# Patient Record
Sex: FEMALE | Race: WHITE | NOT HISPANIC OR LATINO | Employment: OTHER | URBAN - METROPOLITAN AREA
[De-identification: names, ages, dates, MRNs, and addresses within clinical notes are randomized per-mention and may not be internally consistent; named-entity substitution may affect disease eponyms.]

---

## 2017-10-03 ENCOUNTER — MYC MEDICAL ADVICE (OUTPATIENT)
Dept: FAMILY MEDICINE | Facility: CLINIC | Age: 67
End: 2017-10-03

## 2017-10-03 ENCOUNTER — OFFICE VISIT (OUTPATIENT)
Dept: FAMILY MEDICINE | Facility: CLINIC | Age: 67
End: 2017-10-03
Payer: COMMERCIAL

## 2017-10-03 VITALS
HEART RATE: 78 BPM | SYSTOLIC BLOOD PRESSURE: 156 MMHG | TEMPERATURE: 97.9 F | WEIGHT: 148 LBS | OXYGEN SATURATION: 95 % | BODY MASS INDEX: 27.07 KG/M2 | RESPIRATION RATE: 14 BRPM | DIASTOLIC BLOOD PRESSURE: 103 MMHG

## 2017-10-03 DIAGNOSIS — R30.0 DYSURIA: Primary | ICD-10-CM

## 2017-10-03 DIAGNOSIS — R03.0 ELEVATED BLOOD PRESSURE READING WITHOUT DIAGNOSIS OF HYPERTENSION: ICD-10-CM

## 2017-10-03 DIAGNOSIS — E55.9 VITAMIN D DEFICIENCY: ICD-10-CM

## 2017-10-03 DIAGNOSIS — R73.01 IMPAIRED FASTING GLUCOSE: ICD-10-CM

## 2017-10-03 DIAGNOSIS — Z13.1 SCREENING FOR DIABETES MELLITUS: ICD-10-CM

## 2017-10-03 DIAGNOSIS — R53.83 FATIGUE, UNSPECIFIED TYPE: ICD-10-CM

## 2017-10-03 DIAGNOSIS — E78.5 HYPERLIPIDEMIA LDL GOAL <130: ICD-10-CM

## 2017-10-03 DIAGNOSIS — Z11.59 NEED FOR HEPATITIS C SCREENING TEST: ICD-10-CM

## 2017-10-03 LAB
ALBUMIN UR-MCNC: NEGATIVE MG/DL
APPEARANCE UR: CLEAR
BACTERIA #/AREA URNS HPF: ABNORMAL /HPF
BILIRUB UR QL STRIP: NEGATIVE
COLOR UR AUTO: YELLOW
GLUCOSE UR STRIP-MCNC: NEGATIVE MG/DL
HBA1C MFR BLD: 5.5 % (ref 4.3–6)
HGB UR QL STRIP: ABNORMAL
HYALINE CASTS #/AREA URNS LPF: ABNORMAL /LPF
KETONES UR STRIP-MCNC: NEGATIVE MG/DL
LEUKOCYTE ESTERASE UR QL STRIP: NEGATIVE
MUCOUS THREADS #/AREA URNS LPF: PRESENT /LPF
NITRATE UR QL: NEGATIVE
NON-SQ EPI CELLS #/AREA URNS LPF: ABNORMAL /LPF
PH UR STRIP: 5.5 PH (ref 5–7)
RBC #/AREA URNS AUTO: ABNORMAL /HPF
SOURCE: ABNORMAL
SP GR UR STRIP: 1.01 (ref 1–1.03)
UROBILINOGEN UR STRIP-ACNC: 0.2 EU/DL (ref 0.2–1)
WBC #/AREA URNS AUTO: ABNORMAL /HPF

## 2017-10-03 PROCEDURE — 36415 COLL VENOUS BLD VENIPUNCTURE: CPT | Performed by: FAMILY MEDICINE

## 2017-10-03 PROCEDURE — 86803 HEPATITIS C AB TEST: CPT | Performed by: FAMILY MEDICINE

## 2017-10-03 PROCEDURE — 80053 COMPREHEN METABOLIC PANEL: CPT | Performed by: FAMILY MEDICINE

## 2017-10-03 PROCEDURE — 81001 URINALYSIS AUTO W/SCOPE: CPT | Performed by: FAMILY MEDICINE

## 2017-10-03 PROCEDURE — 99214 OFFICE O/P EST MOD 30 MIN: CPT | Performed by: FAMILY MEDICINE

## 2017-10-03 PROCEDURE — 80061 LIPID PANEL: CPT | Performed by: FAMILY MEDICINE

## 2017-10-03 PROCEDURE — 83036 HEMOGLOBIN GLYCOSYLATED A1C: CPT | Performed by: FAMILY MEDICINE

## 2017-10-03 PROCEDURE — 84443 ASSAY THYROID STIM HORMONE: CPT | Performed by: FAMILY MEDICINE

## 2017-10-03 PROCEDURE — 82306 VITAMIN D 25 HYDROXY: CPT | Performed by: FAMILY MEDICINE

## 2017-10-03 RX ORDER — DIPHENHYD/PHENYLEPH/ACETAMINOP 12.5-5-325
1 TABLET ORAL 2 TIMES DAILY
Qty: 1 KIT | Refills: 0 | Status: SHIPPED | OUTPATIENT
Start: 2017-10-03 | End: 2018-12-20

## 2017-10-03 NOTE — MR AVS SNAPSHOT
After Visit Summary   10/3/2017    Teetee Dalal    MRN: [id number]           Patient Information     Date Of Birth          1950        Visit Information        Provider Department      10/3/2017 11:40 AM Sandy Hargrove MD [facility]        Today's Diagnoses     Dysuria    -  1    Fatigue, unspecified type        Vitamin D deficiency        Hyperlipidemia LDL goal <130        Need for hepatitis C screening test        Screening for diabetes mellitus        Impaired fasting glucose         Elevated blood pressure reading without diagnosis of hypertension          Care Instructions     [facility]  [address]  [address], [place] [address]  [phone]     Dr. Ray, OB-Gyn, is at our clinic, [facility] on Wednesdays.            Follow-ups after your visit        Who to contact     If you have questions or need follow up information about today's clinic visit or your schedule please contact [facility] directly at [phone].  Normal or non-critical lab and imaging results will be communicated to you by OKpandahart, letter or phone within 4 business days after the clinic has received the results. If you do not hear from us within 7 days, please contact the clinic through [employer] or phone. If you have a critical or abnormal lab result, we will notify you by phone as soon as possible.  Submit refill requests through [employer] or call your pharmacy and they will forward the refill request to us. Please allow 3 business days for your refill to be completed.          Additional Information About Your Visit        MyChart Information     [employer] gives you secure access to your electronic health record. If you see a primary care provider, you can also send messages to your care team and make appointments. If you have questions, please call your primary care clinic.  If you do not have a primary care provider, please call  [phone] and they will assist you.        Care EveryWhere ID     This is your Care EveryWhere ID. This could be used by other organizations to access your [place] medical records  [id number]        Your Vitals Were     Pulse Temperature Respirations Pulse Oximetry BMI (Body Mass Index)       78 97.9  F (36.6  C) (Oral) 14 95% 27.07 kg/m2        Blood Pressure from Last 3 Encounters:   10/03/17 (!) 156/103   11/16/16 (!) 143/93   09/22/16 142/84    Weight from Last 3 Encounters:   10/03/17 148 lb (67.1 kg)   11/16/16 142 lb (64.4 kg)   09/22/16 142 lb (64.4 kg)              We Performed the Following     Comprehensive metabolic panel     Hemoglobin A1c     Hepatitis C Screen Reflex to HCV RNA Quant and Genotype     Lipid panel reflex to direct LDL     TSH with free T4 reflex     UA reflex to Microscopic and Culture     Vitamin D Deficiency          Today's Medication Changes          These changes are accurate as of: 10/3/17 12:32 PM.  If you have any questions, ask your nurse or doctor.               Start taking these medicines.        Dose/Directions    blood pressure kit Kit   Used for:  Elevated blood pressure reading without diagnosis of hypertension   Started by:  Sandy Hargrove MD        Dose:  1 kit   1 kit 2 times daily   Quantity:  1 kit   Refills:  0            Where to get your medicines      Some of these will need a paper prescription and others can be bought over the counter.  Ask your nurse if you have questions.     Bring a paper prescription for each of these medications     blood pressure kit Kit                Primary Care Provider Office Phone # Fax #    Sandy Hargrove [phone] [address] [place] [id number]        Equal Access to Services     [facility]: Hadii nadege Pruitt, parul walls, qaanton corea. So [facility] [phone].    ATENCIÓN: Si parveen esposito reeves  disposición servicios gratuitos de asistencia lingüística. Arablela michael [phone].    We comply with applicable federal civil rights laws and [place] laws. We do not discriminate on the basis of race, color, national origin, age, disability, sex, sexual orientation, or gender identity.            Thank you!     Thank you for choosing [facility]  for your care. Our goal is always to provide you with excellent care. Hearing back from our patients is one way we can continue to improve our services. Please take a few minutes to complete the written survey that you may receive in the mail after your visit with us. Thank you!             Your Updated Medication List - Protect others around you: Learn how to safely use, store and throw away your medicines at www.disposemymeds.org.          This list is accurate as of: 10/3/17 12:32 PM.  Always use your most recent med list.                   Brand Name Dispense Instructions for use Diagnosis    blood pressure kit Kit     1 kit    1 kit 2 times daily    Elevated blood pressure reading without diagnosis of hypertension

## 2017-10-03 NOTE — PROGRESS NOTES
Hello! Thank you for getting labs done. Your lab test to check for diabetes, HgA1C, also called glycosylated hemoglobin, which measures the level of sugar in your blood over the past few months, is normal which is great!     Congratulations, you don't have diabetes!  However, since your fasting blood sugars have been high in the past, I will continue to screen your blood sugar every year.     If you have any questions, please contact the clinic or schedule an appointment with me, thank you!    Sincerely,    SILVIA AVALOS MD   10/3/2017

## 2017-10-03 NOTE — PROGRESS NOTES
"  SUBJECTIVE:   Teetee Dalal is a 66 year old female who presents to clinic today for the following health issues:      URINARY TRACT SYMPTOMS, UTI with fatigue      Duration: x 1 month; yesterday woke up feeling very unwell without body aches, slept all day yesterday    Description  dysuria and frequency    Intensity:  moderate    Accompanying signs and symptoms:  Fever/chills: no, although briefly yesterday had a hot flash like episode \"clammy\" and felt very unwell  Flank pain no   Nausea and vomiting: no   Vaginal symptoms: none  Abdominal/Pelvic Pain: no     History  History of frequent UTI's: YES- once a year  History of kidney stones: no   Sexually Active: no   Possibility of pregnancy: No    Precipitating or alleviating factors: None, no sick contacts        ROS negative for rashes, cough, runny nose, sob, chest pain     Allergies   Allergen Reactions     No Known Drug Allergies       Past Medical History:   Diagnosis Date     Breast cancer (H) 9/10/96     Hearing loss      Hypertension     On occasion     Intraductal carcinoma, noninfiltrating 4/3/2013    Noted by cancer registry, I'm not sure who is following her, see scan 4/3/2013      Nasal congestion      Screening colonoscopy 6/17/2011      BP (!) 156/103  Pulse 78  Temp 97.9  F (36.6  C) (Oral)  Resp 14  Wt 148 lb (67.1 kg)  SpO2 95%  BMI 27.07 kg/m2 s    BP Readings from Last 3 Encounters:   10/03/17 (!) 156/103   11/16/16 (!) 143/93   09/22/16 142/84      Health Maintenance Due   Topic Date Due     A1C Q1 YR  10/13/1951     HEPATITIS C SCREENING  10/13/1966     DEXA SCAN SCREENING (SYSTEM ASSIGNED)  10/13/2013     BMP Q1 YR  06/05/2014     LIPID MONITORING Q1 YEAR  06/05/2014     MICROALBUMIN Q1 YEAR  06/05/2014     FALL RISK ASSESSMENT  07/05/2017     INFLUENZA VACCINE (SYSTEM ASSIGNED)  09/01/2017     PNEUMOCOCCAL (2 of 2 - PCV13) 09/22/2017      OBJECTIVE:  BP (!) 156/103  Pulse 78  Temp 97.9  F (36.6  C) (Oral)  Resp 14  Wt 148 lb " (67.1 kg)  SpO2 95%  BMI 27.07 kg/m2    Appears tired, in no apparent distress.  Vital signs are normal. The abdomen is soft without tenderness, guarding, mass, rebound or organomegaly. No CVA tenderness or inguinal adenopathy noted.   ASSESSMENT: UTI uncomplicated without evidence of pyelonephritis    PLAN: Treatment per orders - also push fluids, may use Pyridium OTC prn. Call or return to clinic prn if these symptoms worsen or fail to improve as anticipated.

## 2017-10-03 NOTE — PROGRESS NOTES
Patient was seen today in clinic.  I discussed results in clinic, please see clinic progress note.    Sandy Hargrove 10/3/2017

## 2017-10-03 NOTE — PATIENT INSTRUCTIONS
[facility]  [address]  [address], [place] [address]  [phone]     Dr. Ray, OB-Gyn, is at our clinic, [facility] on Wednesdays.

## 2017-10-03 NOTE — NURSING NOTE
"Chief Complaint   Patient presents with     UTI       Initial BP (!) 156/103  Pulse 78  Temp 97.9  F (36.6  C) (Oral)  Resp 14  Wt 148 lb (67.1 kg)  SpO2 95%  BMI 27.07 kg/m2 Estimated body mass index is 27.07 kg/(m^2) as calculated from the following:    Height as of 3/7/16: 5' 2\" (1.575 m).    Weight as of this encounter: 148 lb (67.1 kg).  Medication Reconciliation: complete     Jannet Luis MA    "

## 2017-10-04 LAB
ALBUMIN SERPL-MCNC: 3.7 G/DL (ref 3.4–5)
ALP SERPL-CCNC: 96 U/L (ref 40–150)
ALT SERPL W P-5'-P-CCNC: 31 U/L (ref 0–50)
ANION GAP SERPL CALCULATED.3IONS-SCNC: 7 MMOL/L (ref 3–14)
AST SERPL W P-5'-P-CCNC: 24 U/L (ref 0–45)
BILIRUB SERPL-MCNC: 0.9 MG/DL (ref 0.2–1.3)
BUN SERPL-MCNC: 11 MG/DL (ref 7–30)
CALCIUM SERPL-MCNC: 9.1 MG/DL (ref 8.5–10.1)
CHLORIDE SERPL-SCNC: 107 MMOL/L (ref 94–109)
CHOLEST SERPL-MCNC: 237 MG/DL
CO2 SERPL-SCNC: 26 MMOL/L (ref 20–32)
CREAT SERPL-MCNC: 0.64 MG/DL (ref 0.52–1.04)
DEPRECATED CALCIDIOL+CALCIFEROL SERPL-MC: 17 UG/L (ref 20–75)
GFR SERPL CREATININE-BSD FRML MDRD: >90 ML/MIN/1.7M2
GLUCOSE SERPL-MCNC: 84 MG/DL (ref 70–99)
HCV AB SERPL QL IA: NONREACTIVE
HDLC SERPL-MCNC: 38 MG/DL
LDLC SERPL CALC-MCNC: 130 MG/DL
NONHDLC SERPL-MCNC: 199 MG/DL
POTASSIUM SERPL-SCNC: 4.2 MMOL/L (ref 3.4–5.3)
PROT SERPL-MCNC: 7.2 G/DL (ref 6.8–8.8)
SODIUM SERPL-SCNC: 140 MMOL/L (ref 133–144)
TRIGL SERPL-MCNC: 347 MG/DL
TSH SERPL DL<=0.005 MIU/L-ACNC: 1.1 MU/L (ref 0.4–4)

## 2017-10-04 RX ORDER — ERGOCALCIFEROL 1.25 MG/1
50000 CAPSULE, LIQUID FILLED ORAL WEEKLY
Qty: 12 CAPSULE | Refills: 0 | Status: SHIPPED | OUTPATIENT
Start: 2017-10-04 | End: 2018-12-20

## 2017-10-05 NOTE — PROGRESS NOTES
Hello!  It was a pleasure to see you in clinic!  Thank you for getting labs done.     The testing of your blood sugar, kidney function, liver function and electrolytes was normal. Your screening test was negative for Hepatitis C, which is great news! You have NOT been infected with this liver disease.  You do not need any further testing for Hepatitis C.     Your thyroid function is normal, which is good news.  This means that you do not have hyperthyroidism or hypothyroidism.     Your vitamin D level is low.    As you may know, vitamin D deficiency is associated with many medical problems including osteoporosis, muscles aches and pains, weakness and falls.  Maintaining adequate levels is very important for good health.  During winter months (October through March), people in northern climates are not able to synthesize vitamin D from sunlight and therefore have higher rates of deficiency due to inadquate oral intake. Vitamin D deficiency is very common in [place]!    I recommend taking a high dose supplement for 3 months, 50,000 units once per week.  You could consider rechecking your level when you have finished this course. I will send a prescription to your pharmacy.     After you are done with the high dose,  I recommend taking a daily supplement of 2000 units daily.      Your cholesterol is abnormal. Your total cholesterol is high, your triglycerides are very high, and your HDL is too low. This does make your heart disease risk in the next 10 years high enough (13%) to warrant starting a cholesterol lowering medication.  Please schedule an appointment with me to discuss this when you get a chance!    Desired or goal levels are:  CHOLESTEROL: Desirable is less than 200.  HDL (Good Cholesterol): Desirable is greater than 40 in men and greater than 50 in women.  LDL (Bad Cholesterol): Desirable is less than 130  TRIGLYCERIDES: Desirable is less than 150.    Please follow the recommendations below and schedule a  lab only appointment ([phone]) in 2-3 months for a repeat fasting test. Please don't have anything to eat or drink (except water) for 8 hours prior to the test.    I recommend that you take Omega-3 fatty acids (available in capsules) to improve your triglycerides. You need 2000 - 4000 mg daily of EPA + DHA. This usually means 4 - 8 capsules a day, depending on the strength you buy.  To keep triglycerides in check, reduce alcohol, sugar, juice, excess fruit, bread, pasta, rice and  Cereal in your diet. Increase exercise  and Omega 3 (fish oil supplements) with  Meals.    Also consider starting or increasing your aerobic activity; this is the best way to improve HDL (good) cholesterol. Exercise for at least 30 min 5 times per week, and lift weights 2-3 times per week.    As you may know, abnormal cholesterol is one factor that increases your risk for heart disease and stroke. You can improve your cholesterol by controlling the amount and type of fat you eat and by increasing your daily activity level.    Here are some ways to improve your nutrition (adapted from the American Academy of Family Practice handout):  Eat less fat (especially butter, Crisco and other saturated fats)  Buy lean cuts of meat; reduce your portions of red meat or substitute poultry or fish, or avoid meat altogether.  Use skim milk and low-fat dairy products  Eat no more than 4 egg yolks per week  Avoid fried or fast foods that are high in fat  Eat more fruits and vegetables, trying to make your plate of food at least half non-starchy vegetables.    If you have any questions, please contact the clinic or schedule an appointment with me, thank you!    Sincerely,    Sandy Hargrove MD

## 2017-10-25 ENCOUNTER — OFFICE VISIT (OUTPATIENT)
Dept: OBGYN | Facility: CLINIC | Age: 67
End: 2017-10-25
Payer: COMMERCIAL

## 2017-10-25 VITALS
WEIGHT: 145 LBS | HEART RATE: 82 BPM | HEIGHT: 63 IN | BODY MASS INDEX: 25.69 KG/M2 | DIASTOLIC BLOOD PRESSURE: 76 MMHG | SYSTOLIC BLOOD PRESSURE: 120 MMHG

## 2017-10-25 DIAGNOSIS — N81.10 VAGINAL PROLAPSE: Primary | ICD-10-CM

## 2017-10-25 PROCEDURE — 99202 OFFICE O/P NEW SF 15 MIN: CPT | Performed by: OBSTETRICS & GYNECOLOGY

## 2017-10-25 NOTE — PROGRESS NOTES
"Teetee Dalal is a 67 year old  who presents to assess pelvic prolapse.  She is s/p abdominal hysterectomy in .  I last saw her in .  She was using a cube pessary but it did not work very well for her.  She has been using some menstrual sponges as pessaries, and this seems to help.  However, she notes increased problems lately--if constipated, the prolapse seems to make bowel movements more difficult.  She also has had urinary symptoms and been evaluated for UTI, but no infection found.  She describes some stress urinary incontinence.  She wonders about other options to treat prolapse.         OBJECTIVE: /76  Pulse 82  Ht 5' 2.5\" (1.588 m)  Wt 145 lb (65.8 kg)  Breastfeeding? No  BMI 26.1 kg/m2     Pelvic exam:  External genitalia appeared atrophic, otherwise normal without lesion.  The vaginal walls are prolapsed, with the anterior vaginal wall visible at the introitus.  Prolapse increases with Valsalva.  Bimanual exam revealed a no pelvic masses nor tenderness.  Rectovaginal showed good sphincter tone.  Small rectocele palpable.        ASSESSMENT:  Symptomatic pelvic prolapse.      PLAN:  We discussed prolapse, options of treatment both surgical and non-surgical.  We discussed urinary incontinence.  She will consult with Dr. Heidy Robledo at [facility].      Please note greater than 50% of this 20 minute appointment were spent in counseling with the patient of the issues described above in the history of present illness and in the plan, including evaluation and management of pelvic prolapse.   "

## 2017-10-25 NOTE — NURSING NOTE
"Chief Complaint   Patient presents with     Consult       Initial /76  Pulse 82  Ht 5' 2.5\" (1.588 m)  Wt 145 lb (65.8 kg)  Breastfeeding? No  BMI 26.1 kg/m2 Estimated body mass index is 26.1 kg/(m^2) as calculated from the following:    Height as of this encounter: 5' 2.5\" (1.588 m).    Weight as of this encounter: 145 lb (65.8 kg).  BP completed using cuff size: regular        The following HM Due: NONE      The following patient reported/Care Every where data was sent to:  P ABSTRACT QUALITY INITIATIVES [96486]  none     n/a             "

## 2017-10-25 NOTE — MR AVS SNAPSHOT
"              After Visit Summary   10/25/2017    Teetee Dalal    MRN: [id number]           Patient Information     Date Of Birth          1950        Visit Information        Provider Department      10/25/2017 8:30 AM Kat Ray MD [facility]        Today's Diagnoses     Vaginal prolapse    -  1       Follow-ups after your visit        Who to contact     If you have questions or need follow up information about today's clinic visit or your schedule please contact [facility] directly at [phone].  Normal or non-critical lab and imaging results will be communicated to you by Cinemurhart, letter or phone within 4 business days after the clinic has received the results. If you do not hear from us within 7 days, please contact the clinic through [employer] or phone. If you have a critical or abnormal lab result, we will notify you by phone as soon as possible.  Submit refill requests through [employer] or call your pharmacy and they will forward the refill request to us. Please allow 3 business days for your refill to be completed.          Additional Information About Your Visit        MyChart Information     [employer] gives you secure access to your electronic health record. If you see a primary care provider, you can also send messages to your care team and make appointments. If you have questions, please call your primary care clinic.  If you do not have a primary care provider, please call [phone] and they will assist you.        Care EveryWhere ID     This is your Care EveryWhere ID. This could be used by other organizations to access your [place] medical records  [id number]        Your Vitals Were     Pulse Height Breastfeeding? BMI (Body Mass Index)          82 5' 2.5\" (1.588 m) No 26.1 kg/m2         Blood Pressure from Last 3 Encounters:   10/25/17 120/76   10/03/17 (!) 156/103   11/16/16 (!) 143/93    Weight from Last 3 Encounters:   10/25/17 145 lb (65.8 kg)   10/03/17 " 148 lb (67.1 kg)   11/16/16 142 lb (64.4 kg)              Today, you had the following     No orders found for display       Primary Care Provider Office Phone # Fax #    Sandy Hargrove [phone] [address]  [facility] [address]        Equal Access to Services     FRANCA LOCKE : Hadii aad ku hadasho Soomaali, waaxda luqadaha, qaybta kaalmada adeegyada, waxay idiin hayaan adeeg kharash la'aan . So [facility] [phone].    ATENCIÓN: Si habla español, tiene a reeves disposición servicios gratuitos de asistencia lingüística. Llame al [phone].    We comply with applicable federal civil rights laws and [place] laws. We do not discriminate on the basis of race, color, national origin, age, disability, sex, sexual orientation, or gender identity.            Thank you!     Thank you for choosing [facility]  for your care. Our goal is always to provide you with excellent care. Hearing back from our patients is one way we can continue to improve our services. Please take a few minutes to complete the written survey that you may receive in the mail after your visit with us. Thank you!             Your Updated Medication List - Protect others around you: Learn how to safely use, store and throw away your medicines at www.disposemymeds.org.          This list is accurate as of: 10/25/17  9:33 AM.  Always use your most recent med list.                   Brand Name Dispense Instructions for use Diagnosis    blood pressure kit Kit     1 kit    1 kit 2 times daily    Elevated blood pressure reading without diagnosis of hypertension       vitamin D 65314 UNIT capsule    ERGOCALCIFEROL    12 capsule    Take 1 capsule (50,000 Units) by mouth once a week    Vitamin D deficiency

## 2018-12-20 ENCOUNTER — OFFICE VISIT (OUTPATIENT)
Dept: FAMILY MEDICINE | Facility: CLINIC | Age: 68
End: 2018-12-20
Payer: COMMERCIAL

## 2018-12-20 VITALS
TEMPERATURE: 97.7 F | SYSTOLIC BLOOD PRESSURE: 146 MMHG | HEIGHT: 62 IN | WEIGHT: 152 LBS | RESPIRATION RATE: 16 BRPM | DIASTOLIC BLOOD PRESSURE: 90 MMHG | OXYGEN SATURATION: 94 % | BODY MASS INDEX: 27.97 KG/M2 | HEART RATE: 78 BPM

## 2018-12-20 DIAGNOSIS — R20.2 NUMBNESS AND TINGLING OF RIGHT LOWER EXTREMITY: ICD-10-CM

## 2018-12-20 DIAGNOSIS — Z23 NEED FOR PNEUMOCOCCAL VACCINATION: ICD-10-CM

## 2018-12-20 DIAGNOSIS — R20.0 NUMBNESS AND TINGLING OF RIGHT LOWER EXTREMITY: ICD-10-CM

## 2018-12-20 DIAGNOSIS — Z23 NEED FOR PROPHYLACTIC VACCINATION AND INOCULATION AGAINST INFLUENZA: ICD-10-CM

## 2018-12-20 DIAGNOSIS — G57.11 MERALGIA PARESTHETICA OF RIGHT SIDE: Primary | ICD-10-CM

## 2018-12-20 DIAGNOSIS — E53.8 VITAMIN B12 DEFICIENCY (NON ANEMIC): ICD-10-CM

## 2018-12-20 LAB — VIT B12 SERPL-MCNC: 207 PG/ML (ref 193–986)

## 2018-12-20 PROCEDURE — 82607 VITAMIN B-12: CPT | Performed by: FAMILY MEDICINE

## 2018-12-20 PROCEDURE — G0009 ADMIN PNEUMOCOCCAL VACCINE: HCPCS | Performed by: FAMILY MEDICINE

## 2018-12-20 PROCEDURE — G0008 ADMIN INFLUENZA VIRUS VAC: HCPCS | Performed by: FAMILY MEDICINE

## 2018-12-20 PROCEDURE — 80048 BASIC METABOLIC PNL TOTAL CA: CPT | Performed by: FAMILY MEDICINE

## 2018-12-20 PROCEDURE — 36415 COLL VENOUS BLD VENIPUNCTURE: CPT | Performed by: FAMILY MEDICINE

## 2018-12-20 PROCEDURE — 90670 PCV13 VACCINE IM: CPT | Performed by: FAMILY MEDICINE

## 2018-12-20 PROCEDURE — 99000 SPECIMEN HANDLING OFFICE-LAB: CPT | Performed by: FAMILY MEDICINE

## 2018-12-20 PROCEDURE — 99213 OFFICE O/P EST LOW 20 MIN: CPT | Performed by: FAMILY MEDICINE

## 2018-12-20 PROCEDURE — 90662 IIV NO PRSV INCREASED AG IM: CPT | Performed by: FAMILY MEDICINE

## 2018-12-20 PROCEDURE — 84207 ASSAY OF VITAMIN B-6: CPT | Mod: 90 | Performed by: FAMILY MEDICINE

## 2018-12-20 ASSESSMENT — PATIENT HEALTH QUESTIONNAIRE - PHQ9
SUM OF ALL RESPONSES TO PHQ QUESTIONS 1-9: 8
5. POOR APPETITE OR OVEREATING: NOT AT ALL

## 2018-12-20 ASSESSMENT — ANXIETY QUESTIONNAIRES
2. NOT BEING ABLE TO STOP OR CONTROL WORRYING: NOT AT ALL
6. BECOMING EASILY ANNOYED OR IRRITABLE: NEARLY EVERY DAY
GAD7 TOTAL SCORE: 4
5. BEING SO RESTLESS THAT IT IS HARD TO SIT STILL: NOT AT ALL
1. FEELING NERVOUS, ANXIOUS, OR ON EDGE: NOT AT ALL
3. WORRYING TOO MUCH ABOUT DIFFERENT THINGS: NOT AT ALL
IF YOU CHECKED OFF ANY PROBLEMS ON THIS QUESTIONNAIRE, HOW DIFFICULT HAVE THESE PROBLEMS MADE IT FOR YOU TO DO YOUR WORK, TAKE CARE OF THINGS AT HOME, OR GET ALONG WITH OTHER PEOPLE: SOMEWHAT DIFFICULT
7. FEELING AFRAID AS IF SOMETHING AWFUL MIGHT HAPPEN: SEVERAL DAYS

## 2018-12-20 ASSESSMENT — MIFFLIN-ST. JEOR: SCORE: 1154.78

## 2018-12-20 NOTE — NURSING NOTE
Screening Questionnaire for Adult Immunization    Are you sick today?   No   Do you have allergies to medications, food, a vaccine component or latex?   No   Have you ever had a serious reaction after receiving a vaccination?   No   Do you have a long-term health problem with heart disease, lung disease, asthma, kidney disease, metabolic disease (e.g. diabetes), anemia, or other blood disorder?   No   Do you have cancer, leukemia, HIV/AIDS, or any other immune system problem?   No   In the past 3 months, have you taken medications that affect  your immune system, such as prednisone, other steroids, or anticancer drugs; drugs for the treatment of rheumatoid arthritis, Crohn s disease, or psoriasis; or have you had radiation treatments?   No   Have you had a seizure, or a brain or other nervous system problem?   No   During the past year, have you received a transfusion of blood or blood     products, or been given immune (gamma) globulin or antiviral drug?   No   For women: Are you pregnant or is there a chance you could become        pregnant during the next month?   No   Have you received any vaccinations in the past 4 weeks?   No     Immunization questionnaire answers were all negative.        Per orders of Dr. Manriquez, injection of Prevnar 13 and flu shot given by Radha Downing. Patient instructed to remain in clinic for 15 minutes afterwards, and to report any adverse reaction to me immediately.       Screening performed by Radha Downing on 12/20/2018 at 3:08 PM.

## 2018-12-20 NOTE — PROGRESS NOTES
"  SUBJECTIVE:   Teetee Dalal is a 68 year old female who presents to clinic today for the following health issues:      Tingling in leg      Duration: 1 month     Description (location/character/radiation): tingling in right leg - outer thigh    Severity: mild    Accompanying signs and symptoms:  mild itch in the area, comes and goes    History (similar episodes/previous evaluation): None    Therapies tried and outcome: None     Started taking a B vitamin supplement about a month ago.  Is not vegetarian.  She describes the tingling/altered sensation as a \"detached\" sensation.  She can feel things touching her in the area.    ROS:  MS: No back pain.  NEURO: No other areas of numbness/tingling or weakness in the lower extremities      Problem list and histories reviewed & adjusted, as indicated.  Additional history: as documented    BP Readings from Last 3 Encounters:   12/20/18 (!) 152/99   10/25/17 120/76   10/03/17 (!) 156/103    Wt Readings from Last 3 Encounters:   12/20/18 68.9 kg (152 lb)   10/25/17 65.8 kg (145 lb)   10/03/17 67.1 kg (148 lb)                    Reviewed and updated as needed this visit by clinical staff  Tobacco  Allergies  Meds  Med Hx  Surg Hx  Fam Hx  Soc Hx      Reviewed and updated as needed this visit by Provider             OBJECTIVE:     /90   Pulse 78   Temp 97.7  F (36.5  C) (Oral)   Resp 16   Ht 1.562 m (5' 1.5\")   Wt 68.9 kg (152 lb)   LMP  (LMP Unknown)   SpO2 94%   Breastfeeding? No   BMI 28.26 kg/m    Body mass index is 28.26 kg/m .  GEN:  no apparent distress  BACK:  No focal tenderness to palpation over the spinous processes, paraspinal muscles and SI joints.    NEURO:  LE DTR's are 2+ and symmetric.  Great toe dorsiflexion strength intact.  Negative SLR. There is an area on our outer right thigh with decreased light touch and pin prick sensation  SKIN: warm, dry, no significant rash, abnormal pigmentation or lesions       ASSESSMENT/PLAN:     1. Meralgia " paresthetica of right side  Discussed relevant anatomy, pathophysiology, and etiology of this condition. I recommended avoiding tight jeans and pants that put additional pressure on the right groin.  We also discussed that weight loss can be helpful for managing this. I printed out patient education from [place] for her.     2. Numbness and tingling of right lower extremity  While symptoms are quite classic for meralgia paresthetica, I would like to do additional workup to rule out alternative etiologies.    - BASIC METABOLIC PANEL  - Vitamin B6  - Vitamin B12    3. Need for prophylactic vaccination and inoculation against influenza  - FLU VACCINE, INCREASED ANTIGEN, PRESV FREE, AGE 65+ [24769]  - Vaccine Administration, Initial [56414]    4. Need for pneumococcal vaccination  - Pneumococcal vaccine 13 valent PCV13 IM (Prevnar) [13623]  - ADMIN MEDICARE: Pneumococcal Vaccine ()     Nisha Manriquez MD  [facility]

## 2018-12-20 NOTE — PROGRESS NOTES

## 2018-12-21 LAB
ANION GAP SERPL CALCULATED.3IONS-SCNC: 10 MMOL/L (ref 3–14)
BUN SERPL-MCNC: 17 MG/DL (ref 7–30)
CALCIUM SERPL-MCNC: 9.3 MG/DL (ref 8.5–10.1)
CHLORIDE SERPL-SCNC: 106 MMOL/L (ref 94–109)
CO2 SERPL-SCNC: 24 MMOL/L (ref 20–32)
CREAT SERPL-MCNC: 0.71 MG/DL (ref 0.52–1.04)
GFR SERPL CREATININE-BSD FRML MDRD: 86 ML/MIN/{1.73_M2}
GLUCOSE SERPL-MCNC: 79 MG/DL (ref 70–99)
POTASSIUM SERPL-SCNC: 3.6 MMOL/L (ref 3.4–5.3)
SODIUM SERPL-SCNC: 140 MMOL/L (ref 133–144)

## 2018-12-21 ASSESSMENT — ANXIETY QUESTIONNAIRES: GAD7 TOTAL SCORE: 4

## 2018-12-24 LAB — VIT B6 SERPL-MCNC: 310.6 NMOL/L (ref 20–125)

## 2018-12-24 NOTE — RESULT ENCOUNTER NOTE
Hi Teetee,  Your basic metabolic panel results (blood salts, blood sugar, and kidney function) are normal.  Your B12 level is on the lower side and I do recommend that you take an additional B12 supplement.  We're still waiting for the B6 result.    I'd like to see the B12 level above 300.  I sent a prescription to your pharmacy for B12 1,000 mcg capsules which you can take once daily.  (It is technically over-the-counter so your insurance may not cover this.)  I doubt the numbness you are having is related to the low B12 level but we'll be proactive and get that B12 level a bit higher.      Nisha Manriquez MD

## 2018-12-27 NOTE — RESULT ENCOUNTER NOTE
Hi Teetee,  Your B6 level is a bit too high.  I doubt this is causing the numbness in your leg, but I do think you may want to cut back on any sources of excess B6 (multivitamins, energy drinks, etc).  Nisha Manriquez MD

## 2019-04-04 ENCOUNTER — OFFICE VISIT (OUTPATIENT)
Dept: FAMILY MEDICINE | Facility: CLINIC | Age: 69
End: 2019-04-04
Payer: COMMERCIAL

## 2019-04-04 VITALS
HEART RATE: 98 BPM | RESPIRATION RATE: 14 BRPM | TEMPERATURE: 97.8 F | DIASTOLIC BLOOD PRESSURE: 106 MMHG | OXYGEN SATURATION: 94 % | SYSTOLIC BLOOD PRESSURE: 144 MMHG

## 2019-04-04 DIAGNOSIS — M54.2 NECK PAIN: ICD-10-CM

## 2019-04-04 DIAGNOSIS — I10 BENIGN ESSENTIAL HYPERTENSION: ICD-10-CM

## 2019-04-04 DIAGNOSIS — Z91.09 ENVIRONMENTAL ALLERGIES: ICD-10-CM

## 2019-04-04 DIAGNOSIS — R35.0 URINARY FREQUENCY: ICD-10-CM

## 2019-04-04 DIAGNOSIS — R68.89 FLU-LIKE SYMPTOMS: ICD-10-CM

## 2019-04-04 DIAGNOSIS — J10.1 INFLUENZA A: Primary | ICD-10-CM

## 2019-04-04 DIAGNOSIS — R53.83 FATIGUE, UNSPECIFIED TYPE: ICD-10-CM

## 2019-04-04 PROBLEM — R03.0 ELEVATED BLOOD PRESSURE READING WITHOUT DIAGNOSIS OF HYPERTENSION: Status: RESOLVED | Noted: 2017-10-03 | Resolved: 2019-04-04

## 2019-04-04 LAB
ALBUMIN UR-MCNC: NEGATIVE MG/DL
APPEARANCE UR: CLEAR
BACTERIA #/AREA URNS HPF: ABNORMAL /HPF
BASOPHILS # BLD AUTO: 0 10E9/L (ref 0–0.2)
BASOPHILS NFR BLD AUTO: 0.3 %
BILIRUB UR QL STRIP: NEGATIVE
COLOR UR AUTO: YELLOW
DIFFERENTIAL METHOD BLD: NORMAL
EOSINOPHIL # BLD AUTO: 0.2 10E9/L (ref 0–0.7)
EOSINOPHIL NFR BLD AUTO: 1.6 %
ERYTHROCYTE [DISTWIDTH] IN BLOOD BY AUTOMATED COUNT: 13.9 % (ref 10–15)
FLUAV+FLUBV AG SPEC QL: NEGATIVE
FLUAV+FLUBV AG SPEC QL: POSITIVE
GLUCOSE UR STRIP-MCNC: NEGATIVE MG/DL
HCT VFR BLD AUTO: 43 % (ref 35–47)
HGB BLD-MCNC: 14.2 G/DL (ref 11.7–15.7)
HGB UR QL STRIP: ABNORMAL
KETONES UR STRIP-MCNC: NEGATIVE MG/DL
LEUKOCYTE ESTERASE UR QL STRIP: ABNORMAL
LYMPHOCYTES # BLD AUTO: 0.8 10E9/L (ref 0.8–5.3)
LYMPHOCYTES NFR BLD AUTO: 8.7 %
MCH RBC QN AUTO: 29.6 PG (ref 26.5–33)
MCHC RBC AUTO-ENTMCNC: 33 G/DL (ref 31.5–36.5)
MCV RBC AUTO: 90 FL (ref 78–100)
MONOCYTES # BLD AUTO: 0.9 10E9/L (ref 0–1.3)
MONOCYTES NFR BLD AUTO: 9.7 %
NEUTROPHILS # BLD AUTO: 7.4 10E9/L (ref 1.6–8.3)
NEUTROPHILS NFR BLD AUTO: 79.7 %
NITRATE UR QL: NEGATIVE
NON-SQ EPI CELLS #/AREA URNS LPF: ABNORMAL /LPF
PH UR STRIP: 6 PH (ref 5–7)
PLATELET # BLD AUTO: 193 10E9/L (ref 150–450)
RBC # BLD AUTO: 4.8 10E12/L (ref 3.8–5.2)
RBC #/AREA URNS AUTO: ABNORMAL /HPF
SOURCE: ABNORMAL
SP GR UR STRIP: <=1.005 (ref 1–1.03)
SPECIMEN SOURCE: ABNORMAL
UROBILINOGEN UR STRIP-ACNC: 0.2 EU/DL (ref 0.2–1)
WBC # BLD AUTO: 9.3 10E9/L (ref 4–11)
WBC #/AREA URNS AUTO: ABNORMAL /HPF

## 2019-04-04 PROCEDURE — 85025 COMPLETE CBC W/AUTO DIFF WBC: CPT | Performed by: FAMILY MEDICINE

## 2019-04-04 PROCEDURE — 87804 INFLUENZA ASSAY W/OPTIC: CPT | Performed by: FAMILY MEDICINE

## 2019-04-04 PROCEDURE — 87086 URINE CULTURE/COLONY COUNT: CPT | Performed by: FAMILY MEDICINE

## 2019-04-04 PROCEDURE — 80053 COMPREHEN METABOLIC PANEL: CPT | Performed by: FAMILY MEDICINE

## 2019-04-04 PROCEDURE — 36415 COLL VENOUS BLD VENIPUNCTURE: CPT | Performed by: FAMILY MEDICINE

## 2019-04-04 PROCEDURE — 99214 OFFICE O/P EST MOD 30 MIN: CPT | Performed by: FAMILY MEDICINE

## 2019-04-04 PROCEDURE — 81001 URINALYSIS AUTO W/SCOPE: CPT | Performed by: FAMILY MEDICINE

## 2019-04-04 RX ORDER — BENZONATATE 200 MG/1
200 CAPSULE ORAL 3 TIMES DAILY PRN
Qty: 21 CAPSULE | Refills: 0 | Status: SHIPPED | OUTPATIENT
Start: 2019-04-04 | End: 2019-04-30

## 2019-04-04 NOTE — PROGRESS NOTES
SUBJECTIVE:   Teetee Dalla is a 68 year old female who presents to clinic today for the following health issues:here with friend    Acute Illness   Acute illness concerns: cough, headache, congestion  Onset: x 3 days    Fever: no    Chills/Sweats: YES    Headache (location?): YES    Sinus Pressure:no    Conjunctivitis:  no    Ear Pain: YES: both    Rhinorrhea: YES    Congestion: YES    Sore Throat: YES     Cough: YES    Wheeze: no    Decreased Appetite: YES    Nausea: no    Vomiting: no    Diarrhea:  YES,spot of blood on wiping,     Dysuria/Freq.: YES- frequency    Fatigue/Achiness: YES    Sick/Strep Exposure: YES. Granddaughter . Had a ear infection     Therapies Tried and outcome: ibuprofen and tylenol, sudafed but did not help    Former smoker, BMI > 25, hx of breast cancer intraductal carcinoma, s/p lumpectomy, PILO, BSO in remission, HLD on red rice yeast, impaired glucose, decreased hearing right ear, vit d deficiency, elevated BP on no meds yet, pelvic relaxation , prior change in stool habits , skin lesions on chest and arms, hx of chronic fatigue, prior colonic polyp noted in 2003, on OTC supplements, under care PCP Dr Hargrove, last seen her in oct 2017 for a UTI, seen then by Dr Manriquez 12/20/18 for meralgia paresthetic on right when also given the flu and shingrex shots.     Here for above symptoms. Also noting Urinating more frequently. Feels os not managing pain very well and not sleeping. Only taken Ibuprofen 400 mg every 4 hrs past 24 hrs. Used Acetaminophen to supplement but it didn't help. Tried one sudafed but it didn't help. pain in muscles back of neck feels keeps her from sleeping. Massaging did help. Would like referral for a massage at the place she goes to normally.     Problem list and histories reviewed & adjusted, as indicated.  Additional history: as documented    Patient Active Problem List   Diagnosis     Other specified genital prolapse(968.10)     Skin lesion of chest wall     Skin  lesion of right arm     Advance Care Planning     Hypercholesterolemia     Overweight (BMI 25.0-29.9)     Environmental allergies     Intraductal carcinoma, noninfiltrating     Impaired fasting blood sugar     Vitamin D deficiency     Hearing loss in right ear     Pelvic relaxation     Family history of colon cancer     Skin lesion of left arm     Fatigue, unspecified type     Need for hepatitis C screening test     Hx of colonic polyps     Hypertropia     Intermittent esotropia, alternating     Personal history of malignant neoplasm of breast     Superior oblique palsy     Benign essential hypertension     Past Surgical History:   Procedure Laterality Date     HYSTERECTOMY TOTAL ABDOMINAL, BILATERAL SALPINGO-OOPHORECTOMY, COMBINED  2/18/2004    lumpectomy left breast - [place]     LUMPECTOMY BREAST  1996     SURGICAL HISTORY OF -   9/03    hyperplastic colonic polyps - benign       Social History     Tobacco Use     Smoking status: Former Smoker     Types: Cigarettes     Smokeless tobacco: Never Used   Substance Use Topics     Alcohol use: Yes     Comment: moderate     Family History   Problem Relation Age of Onset     Hypertension Mother      Breast Cancer Mother         12 years ago     Depression Mother      Psychotic Disorder Mother      Diabetes Father      Hypertension Father      Alcohol/Drug Father          Current Outpatient Medications   Medication Sig Dispense Refill     benzonatate (TESSALON) 200 MG capsule Take 1 capsule (200 mg) by mouth 3 times daily as needed for cough 21 capsule 0     Cyanocobalamin (B-12) 1000 MCG CAPS Take 1,000 mcg by mouth daily 100 capsule 3     Red Yeast Rice Extract (RED YEAST RICE PO) Take 2 capsule daily       Allergies   Allergen Reactions     No Known Drug Allergies      Recent Labs   Lab Test 04/04/19  1319 12/20/18  1503 10/03/17  1240 06/05/13  0808  01/20/12  1011   A1C  --   --  5.5  --   --   --    LDL  --   --  130* 139*  --  200*   HDL  --   --  38* 38*  --  45*    TRIG  --   --  347* 130  --  87   ALT 27  --  31  --   --   --    CR 0.68 0.71 0.64 0.55  --  0.59   GFRESTIMATED 88 86 >90 >90  --  >90   GFRESTBLACK >90 >90 >90 >90  --  >90   POTASSIUM 4.0 3.6 4.2 3.6  --  4.1   TSH  --   --  1.10 1.37   < >  --     < > = values in this interval not displayed.      BP Readings from Last 3 Encounters:   04/04/19 (!) 144/106   12/20/18 146/90   10/25/17 120/76    Wt Readings from Last 3 Encounters:   12/20/18 68.9 kg (152 lb)   10/25/17 65.8 kg (145 lb)   10/03/17 67.1 kg (148 lb)                  Labs reviewed in EPIC    Reviewed and updated as needed this visit by clinical staff       Reviewed and updated as needed this visit by Provider         ROS:  Constitutional, HEENT, cardiovascular, pulmonary, GI, , musculoskeletal, neuro, skin, endocrine and psych systems are negative, except as otherwise noted.    OBJECTIVE:     BP (!) 144/106 (BP Location: Left arm, Patient Position: Sitting, Cuff Size: Adult Regular)   Pulse 98   Temp 97.8  F (36.6  C) (Oral)   Resp 14   LMP  (LMP Unknown)   SpO2 94%   Breastfeeding? No   There is no height or weight on file to calculate BMI.  GENERAL: alert, no distress, over weight, elderly and fatigued  EYES: Eyes grossly normal to inspection, PERRL and conjunctivae and sclerae normal  HENT: ear canals and TM's normal, nose and mouth without ulcers or lesions  NECK: no adenopathy, no asymmetry, masses, or scars and thyroid normal to palpation  RESP: lungs clear to auscultation - no rales, rhonchi or wheezes, has a bit of a cough  CV: regular rate and rhythm, normal S1 S2, no S3 or S4, no murmur, click or rub, no peripheral edema and peripheral pulses strong  ABDOMEN: soft, non tender, no hepatosplenomegaly, no masses and bowel sounds normal  MS: no gross musculoskeletal defects noted, no edema  SKIN: no suspicious lesions or rashes  NEURO: Normal strength and tone, mentation intact and speech normal  PSYCH: mentation appears normal,  fatigued and judgement and insight intact    Diagnostic Test Results:  Results for orders placed or performed in visit on 04/04/19   UA reflex to Microscopic and Culture   Result Value Ref Range    Color Urine Yellow     Appearance Urine Clear     Glucose Urine Negative NEG^Negative mg/dL    Bilirubin Urine Negative NEG^Negative    Ketones Urine Negative NEG^Negative mg/dL    Specific Gravity Urine <=1.005 1.003 - 1.035    Blood Urine Trace (A) NEG^Negative    pH Urine 6.0 5.0 - 7.0 pH    Protein Albumin Urine Negative NEG^Negative mg/dL    Urobilinogen Urine 0.2 0.2 - 1.0 EU/dL    Nitrite Urine Negative NEG^Negative    Leukocyte Esterase Urine Small (A) NEG^Negative    Source Midstream Urine    CBC with platelets differential   Result Value Ref Range    WBC 9.3 4.0 - 11.0 10e9/L    RBC Count 4.80 3.8 - 5.2 10e12/L    Hemoglobin 14.2 11.7 - 15.7 g/dL    Hematocrit 43.0 35.0 - 47.0 %    MCV 90 78 - 100 fl    MCH 29.6 26.5 - 33.0 pg    MCHC 33.0 31.5 - 36.5 g/dL    RDW 13.9 10.0 - 15.0 %    Platelet Count 193 150 - 450 10e9/L    % Neutrophils 79.7 %    % Lymphocytes 8.7 %    % Monocytes 9.7 %    % Eosinophils 1.6 %    % Basophils 0.3 %    Absolute Neutrophil 7.4 1.6 - 8.3 10e9/L    Absolute Lymphocytes 0.8 0.8 - 5.3 10e9/L    Absolute Monocytes 0.9 0.0 - 1.3 10e9/L    Absolute Eosinophils 0.2 0.0 - 0.7 10e9/L    Absolute Basophils 0.0 0.0 - 0.2 10e9/L    Diff Method Automated Method    Comprehensive metabolic panel   Result Value Ref Range    Sodium 139 133 - 144 mmol/L    Potassium 4.0 3.4 - 5.3 mmol/L    Chloride 105 94 - 109 mmol/L    Carbon Dioxide 25 20 - 32 mmol/L    Anion Gap 9 3 - 14 mmol/L    Glucose 101 (H) 70 - 99 mg/dL    Urea Nitrogen 8 7 - 30 mg/dL    Creatinine 0.68 0.52 - 1.04 mg/dL    GFR Estimate 88 >60 mL/min/[1.73_m2]    GFR Estimate If Black >90 >60 mL/min/[1.73_m2]    Calcium 8.9 8.5 - 10.1 mg/dL    Bilirubin Total 1.3 0.2 - 1.3 mg/dL    Albumin 3.7 3.4 - 5.0 g/dL    Protein Total 7.2 6.8 - 8.8  g/dL    Alkaline Phosphatase 103 40 - 150 U/L    ALT 27 0 - 50 U/L    AST 28 0 - 45 U/L   Urine Microscopic   Result Value Ref Range    WBC Urine 5-10 (A) OTO5^0 - 5 /HPF    RBC Urine 2-5 (A) OTO2^O - 2 /HPF    Squamous Epithelial /LPF Urine Few FEW^Few /LPF    Bacteria Urine Few (A) NEG^Negative /HPF   Influenza A/B antigen   Result Value Ref Range    Influenza A/B Agn Specimen Nasopharyngeal     Influenza A Positive (A) NEG^Negative    Influenza B Negative NEG^Negative   Urine Culture Aerobic Bacterial   Result Value Ref Range    Specimen Description Midstream Urine     Culture Micro No growth        ASSESSMENT/PLAN:       ICD-10-CM    1. Influenza A J10.1    2. Flu-like symptoms R68.89 Influenza A/B antigen     Urine Microscopic   3. Neck pain M54.2 CONCEPCION PT, HAND, AND CHIROPRACTIC REFERRAL     PHYSICAL THERAPY REFERRAL   4. Urinary frequency R35.0 benzonatate (TESSALON) 200 MG capsule     UA reflex to Microscopic and Culture     CBC with platelets differential     Comprehensive metabolic panel     Urine Culture Aerobic Bacterial   5. Fatigue, unspecified type R53.83    6. Environmental allergies Z91.09    7. Benign essential hypertension I10      No signs of meningitis or pneumonia. Currently has Influenza A. likely explains all her symptoms. No sign of a urine infection. Will send in a culture and give a call if positive. This later came back negative. Neck pain MSK in nature. Ice/ heat to neck. ibuprofen 600 mg three to 4 times a day. Tylenol 1000 mg three times a day. If not better can do a muscle relaxant short course for neck pain but declined currently.for cough Tessalon three times a day as needed for cough. Can also try   Flonase 1 spray each nostril daily 2 weeks, Zyrtec 10 mg daily 2 week, Mucinex 600 mg twice a day 1 week, Humidifier in room may help. Supportive care as below. Go to the ER if worse. Follow up with primary if symptoms persist. Due for physical with preventive labs and recheck BP which is  noted elevated. Could be from illness but prior trend has been consistently elevated suggesting likely has HTN. Follow up Dr Hargrove for routine care. For symptom relief I suggest tryin. Steam.  Take a long, hot shower.  Or if you don't want to get in the shower just run it with the bathroom door shut for a few minutes and breathe the steam.  2. Drink hot liquids frequently such as tea or hot water with honey and lemon.  3. Acetaminophen (Tylenol) and ibuprofen (Motrin or Advil) as needed for headache, sore throat, body aches, or fever.  4. For loosening phlegm and sputum try guaifenesin (available in many combination products and alone as plain Robitussin or plain Mucinex) and for cough suppression you can try dextromethorphan (Delsym or combined in other products).  5. For nasal congestion try:    An oral decongestant.  The only decongestant I recommend is pseudoephedrine. Ask the pharmacist for the over the counter (but real) pseudoephedrine - not phenylephrine.  This can raise your blood pressure and heart rate so do not use this if you have hypertension.       Afrin spray for 3 days.  (Never use afrin nasal spray for more than 3 days as there is a risk of developing tolerance and rebound/worsening nasal congestion if used longer than this.)    Nealmed sinus rinses.    Nasal steroid spray such as Nasacort or Flonase, which are over-the-counter.  6. And most importantly: plenty of rest and sleep  especially while you have a fever.     Stop smoking and avoid secondhand smoke    Drink lots of fluids such as water and clear soups. Fluids help loosen mucus. Fluids are also important because they help prevent dehydration.    Gargle with warm salt water a few times a day to relieve a sore throat. Throat sprays or lozenges may also help relieve the pain.    Avoid alcohol.    Use saline (salt water) nose drops to help loosen mucus and moisten the tender skin in your nose.  Encouraged Mucinex, warm salt water gargles,  Cepacol spray, soothers/lozenges, sinus rinses (javi med), Flonase (2 sprays per nostril daily x 2 weeks), vitamin C, fluids and rest.  May alternate tylenol and NSAID'S (ibuprofen, Advil, aleve type products) every 4-6 hours for the next few days as needed.   No need for oral antibiotic at this time.    See Patient Instructions    Nuha Cai MD  [facility]

## 2019-04-04 NOTE — PATIENT INSTRUCTIONS
No signs of meningitis or pneumonia  Currently has the flu   No sign of urine infection  Will send in a culture and give you a call if positive  Ice/ heat to neck  ibuprofen 600 mg three to 4 times a day   tylenol 1000 mg three tiems a day   If not better can do a muscle relaxants short course for neck pain  Tessalon three times a day as needed for cough   Can try   flonase 1 spray each nostril daily 2 weeks  Zyrtec 10 mg daily 2 week  mucinex 600 mg twice a day 1 week  Humidifier in room may help  supportive care as below  Go to the Er if worse  Follow up with primary if symptoms persist   Due for phsyical with preventive labs and recheck BP   BP tredning high long time and may be up now due to coughing as well    Your symptoms and exam today indicate that you have a viral upper respiratory illness.  This includes viral rhinosinusitis and viral bronchitis.  Antibiotics do not help viral illnesses; the best remedies treat the symptoms (see below).  The typical course of a viral illness is that you feel rather miserable for the first few days - with sore throat, runny nose/nasal congestion, cough, and sometimes fever and body aches.  You should start to feel better after about 5-7 days and much better by 10-14 days.  If you develop sudden worsening of symptoms or fever after the first 5-7 days, or if you have persistence of your symptoms beyond 14 days, let us know as you may have developed a secondary bacterial infection.      For symptom relief I suggest tryin. Steam.  Take a long, hot shower.  Or if you don't want to get in the shower just run it with the bathroom door shut for a few minutes and breathe the steam.  2. Drink hot liquids frequently such as tea or hot water with honey and lemon.  3. Acetaminophen (Tylenol) and ibuprofen (Motrin or Advil) as needed for headache, sore throat, body aches, or fever.  4. For loosening phlegm and sputum try guaifenesin (available in many combination products and alone  as plain Robitussin or plain Mucinex) and for cough suppression you can try dextromethorphan (Delsym or combined in other products).  5. For nasal congestion try:    An oral decongestant.  The only decongestant I recommend is pseudoephedrine. Ask the pharmacist for the over the counter (but real) pseudoephedrine - not phenylephrine.  This can raise your blood pressure and heart rate so do not use this if you have hypertension.       Afrin spray for 3 days.  (Never use afrin nasal spray for more than 3 days as there is a risk of developing tolerance and rebound/worsening nasal congestion if used longer than this.)    Nealmed sinus rinses.    Nasal steroid spray such as nasacort or flonase, which are over-the-counter.  6. And most importantly: plenty of rest and sleep  especially while you have a fever.     Stop smoking and avoid secondhand smoke    Drink lots of fluids such as water and clear soups. Fluids help loosen mucus. Fluids are also important because they help prevent dehydration.    Gargle with warm salt water a few times a day to relieve a sore throat. Throat sprays or lozenges may also help relieve the pain.    Avoid alcohol.    Use saline (salt water) nose drops to help loosen mucus and moisten the tender skin in your nose.  Encouraged mucinex, warm salt water gargles, cepacol spray, soothers/lozenges, sinus rinses (neilmed), flonase (2 sprays per nostril daily x 2 weeks), vitamin c, fluids and rest.  May alternate tylenol and NSAIDS (ibuprofen, advil, aleve type products) every 4-6 hours for the next few days as needed.   No need for oral antibiotic at this time.

## 2019-04-05 LAB
ALBUMIN SERPL-MCNC: 3.7 G/DL (ref 3.4–5)
ALP SERPL-CCNC: 103 U/L (ref 40–150)
ALT SERPL W P-5'-P-CCNC: 27 U/L (ref 0–50)
ANION GAP SERPL CALCULATED.3IONS-SCNC: 9 MMOL/L (ref 3–14)
AST SERPL W P-5'-P-CCNC: 28 U/L (ref 0–45)
BACTERIA SPEC CULT: NO GROWTH
BILIRUB SERPL-MCNC: 1.3 MG/DL (ref 0.2–1.3)
BUN SERPL-MCNC: 8 MG/DL (ref 7–30)
CALCIUM SERPL-MCNC: 8.9 MG/DL (ref 8.5–10.1)
CHLORIDE SERPL-SCNC: 105 MMOL/L (ref 94–109)
CO2 SERPL-SCNC: 25 MMOL/L (ref 20–32)
CREAT SERPL-MCNC: 0.68 MG/DL (ref 0.52–1.04)
GFR SERPL CREATININE-BSD FRML MDRD: 88 ML/MIN/{1.73_M2}
GLUCOSE SERPL-MCNC: 101 MG/DL (ref 70–99)
POTASSIUM SERPL-SCNC: 4 MMOL/L (ref 3.4–5.3)
PROT SERPL-MCNC: 7.2 G/DL (ref 6.8–8.8)
SODIUM SERPL-SCNC: 139 MMOL/L (ref 133–144)
SPECIMEN SOURCE: NORMAL

## 2019-04-05 NOTE — RESULT ENCOUNTER NOTE
Matt Ms. Dalal,  Your results came back and are within acceptable limits. -Liver and gallbladder tests are normal (ALT,AST, Alk phos, bilirubin), kidney function is normal (Cr, GFR), sodium is normal, potassium is normal, calcium is normal, glucose is normal.  -Urine culture is normal.  There is no need for antibiotics at this point.  If new, worsening or persistent symptoms occur, then you should call or return for a recheck.. If you have any further concerns please do not hesitate to contact us by message, phone or making an appointment.  Have a good day   Dr Trell CANDELARIO

## 2019-09-30 ENCOUNTER — HEALTH MAINTENANCE LETTER (OUTPATIENT)
Age: 69
End: 2019-09-30

## 2019-11-14 ENCOUNTER — TELEPHONE (OUTPATIENT)
Dept: FAMILY MEDICINE | Facility: CLINIC | Age: 69
End: 2019-11-14

## 2019-11-14 NOTE — TELEPHONE ENCOUNTER
Panel Management Review      Patient has the following on her problem list: None      Composite cancer screening  Chart review shows that this patient is due/due soon for the following None  Summary:    Patient is due/failing the following:   Labs and flu shot, FOLLOW UP HTN, MAMMOGRAM and PHYSICAL    Action needed:   Patient needs office visit for Labs and flu shot, FOLLOW UP HTN, MAMMOGRAM and PHYSICAL.    Type of outreach:     appt made to 12/2/2019 to address HM    Questions for provider review:    None                                                                                                                                    Doreen Downing MA       Chart routed to Care Team .

## 2019-11-16 ENCOUNTER — OFFICE VISIT (OUTPATIENT)
Dept: URGENT CARE | Facility: URGENT CARE | Age: 69
End: 2019-11-16
Payer: COMMERCIAL

## 2019-11-16 VITALS
DIASTOLIC BLOOD PRESSURE: 102 MMHG | SYSTOLIC BLOOD PRESSURE: 173 MMHG | BODY MASS INDEX: 28.34 KG/M2 | TEMPERATURE: 97.4 F | WEIGHT: 154 LBS | HEIGHT: 62 IN | HEART RATE: 85 BPM | OXYGEN SATURATION: 97 %

## 2019-11-16 DIAGNOSIS — R03.0 ELEVATED BLOOD PRESSURE READING: Primary | ICD-10-CM

## 2019-11-16 PROCEDURE — 99213 OFFICE O/P EST LOW 20 MIN: CPT | Performed by: FAMILY MEDICINE

## 2019-11-16 ASSESSMENT — MIFFLIN-ST. JEOR: SCORE: 1166.79

## 2019-11-17 NOTE — PATIENT INSTRUCTIONS
Keep a blood pressure log as discussed at your visit tonight for BP check.   Bring the log with you to your appointment on Tuesday with your primary provider.  If any concerning symptoms develop -- for example, chest pain, vision changes, lightheaded, headaches, etc -- return to care right away.  Bring your new BP machine to the appointment on Tuesday along with the log.       Patient Education     Uncontrolled High Blood Pressure (Established)    Your blood pressure was unusually high today. This can occur if you ve missed doses of your blood pressure medicine. Or it can happen if you are taking other medicines. These include some asthma inhalers, decongestants, diet pills, and street drugs like cocaine and amphetamine.  Other causes include:    Weight gain    More salt in your diet    Smoking    Caffeine  Your blood pressure can also rise if you are emotionally upset or in intense pain. It may go back to normal after a period of rest.  Blood pressure measurements are given as 2 numbers. Systolic blood pressure is the upper number. This is the pressure when the heart contracts. Diastolic blood pressure is the lower number. This is the pressure when the heart relaxes between beats. You will see your blood pressure readings written together. For example, a person with a systolic pressure of 118 and a diastolic pressure of 78 will have 118/78 written in the medical record. To be high blood pressure, the numbers must be higher when tested over a period of time.  Blood pressure is categorized as normal, elevated, or stage 1 or stage 2 high blood pressure:    Normal blood pressure is systolic of less than 120 and diastolic of less than 80 (120/80)    Elevated blood pressure is systolic of 120 to 129 and diastolic less than 80    Stage 1 high blood pressure is systolic is 130 to 139 or diastolic between 80 to 89    Stage 2 high blood pressure is when systolic is 140 or higher or the diastolic is 90 or higher  Uncontrolled  high blood pressure can cause serious health problems. It raises your risk for heart attack, stroke, and heart failure. In general, if you have high blood pressure, keeping your blood pressure below 130/80 mmHg may help prevent these problems. Your healthcare provider may prescribe medicine to help control blood pressure if lifestyle changes are not enough.  Home care  It s important to take steps to lower your blood pressure. If you are taking blood pressure medicine, the guidelines below may help you need less or no medicines in the future.    Start a weight-loss program if you are overweight.    Cut back on the amount of salt in your diet:  ? Avoid high-salt foods like olives, pickles, smoked meats, and salted potato chips.  ? Don t add salt to your food at the table.  ? Use only small amounts of salt when cooking.    Start an exercise program. Talk with your healthcare provider about what exercise program is best for you. It doesn t have to be difficult. Even brisk walking for 20 minutes 3 times a week is a good form of exercise.    Avoid medicines that stimulates the heart. This includes many over-the-counter cold and sinus decongestant pills and sprays, as well as diet pills. Check the warnings about high blood pressure on the label. Before purchasing any over-the-counter medicines or supplements, always ask the pharmacist about the product's potential interaction with your high blood pressure and your medicines.    Stimulants such as amphetamine or cocaine could be lethal for someone with high blood pressure. Never take these.    Limit how much caffeine you drink. Or switch to noncaffeinated beverages.    Stop smoking. If you are a long-time smoker, this can be hard. Enroll in a stop-smoking program to make it more likely that you will succeed. Talk with your provider about ways to quit.    Learn how to handle stress better. This is an important part of any program to lower blood pressure. Learn ways to  relax. These include meditation, yoga, and biofeedback.    If medicines were prescribed, take them exactly as directed. Missing doses may cause your blood pressure to get out of control.    If you miss a dose or doses of your medicines, check with your healthcare provider or pharmacist about what to do.    Consider buying an automatic blood pressure machine. Your provider may recommend a certain type. You can get one of these at most pharmacies. Measure your blood pressure twice a day, in the morning, and in the late afternoon. Keep a written record of your home blood pressure readings and take the record to your medical appointments.  Here are some additional guidelines on home blood pressure monitoring from the American [employer] Association.    Don't smoke or drink coffee for 30 minutes    Go to the bathroom before the test.    Relax for 5 minutes before taking the measurement.    Sit correctly. Be sure your back is supported. Don't sit on a couch or soft chair. Uncross your feet and place them flat on the floor. Place your arm on a solid, flat surface like a table with the upper arm at heart level. Make certain the middle of the cuff is directly above the bend of the elbow. Check the monitor's instruction manual for an illustration.    Take multiple readings. When you measure, take 2 or 3 readings one minute apart and record all of the results.    Take your blood pressure at the same time every day, or as your healthcare provider recommends.    Record the date, time, and blood pressure reading.    Take the record with you to your next appointment. If your blood pressure monitor has a built-in memory, simply take the monitor with you to your next appointment.    Call your provider if you have several high readings. Don't be frightened by a single high reading, but if you get several high readings, check in with your healthcare provider.    Note: When blood pressure reaches a systolic (top number) of 180 or higher or  a diastolic (bottom number) of 110 or higher, emergency medical treatment is required. Call your healthcare provider immediately.  Follow-up care  Regular visits to your own healthcare provider for blood pressure and medicine checks are an important part of your care. Make a follow-up appointment as directed. Bring the record of your home blood pressure readings to the appointment.  When to seek medical advice  Call your healthcare provider right away if any of these occur:    Blood pressure reaches a systolic (top number) of 180 or higher or diastolic (bottom number) of 110 or higher, emergency medical treatment is required.    Chest, arm, shoulder, neck, or upper back pain    Shortness of breath    Severe headache    Throbbing or rushing sound in the ears    Nosebleed    Extreme drowsiness, confusion, or fainting    Dizziness or dizziness with spinning sensation (vertigo)    Weakness in an arm or leg or on one side of the face    Trouble speaking or seeing   Date Last Reviewed: 1/1/2017 2000-2018 [employer]. [address] [place] [address]. All rights reserved. This information is not intended as a substitute for professional medical care. Always follow your healthcare professional's instructions.

## 2019-11-17 NOTE — PROGRESS NOTES
"SUBJECTIVE:   Teetee Dalal is a 69 year old female presenting with   Chief Complaint   Patient presents with     Urgent Care     Pt in clinic to have eval for hypertension.     Hypertension     Diagnosed with HTN, but has not been on medications for this in the past.   Has had BP's in the 140's/'s the past couple of doctors visits, and was advised to get a home BP machine to start a log, but hadn't done so until yesterday.   BP's in the 150-180's/90's-100's with at home measurements.  Wondering if her BP device is accurate.   She denies any chest pain, palpitations, dyspnea, headaches, vision changes, numbness/tingling or weakness.  Has noticed some fatigue and feeling of being hot/cold intermittently recently.         OBJECTIVE  BP (!) 173/102   Pulse 85   Temp 97.4  F (36.3  C) (Oral)   Ht 1.575 m (5' 2\")   Wt 69.9 kg (154 lb)   LMP  (LMP Unknown)   SpO2 97%   BMI 28.17 kg/m    GENERAL:  Awake, alert and interactive. No acute distress.  HEAD:   NC/AT, EOMI, clear conjunctiva.   CHEST:  Lungs are clear, no rhonchi, wheezing or rales. Normal symmetric air entry throughout both lung fields.   HEART:  S1 and S2 normal, no murmurs. Regular rate and rhythm.      ASSESSMENT/PLAN    ICD-10-CM    1. Elevated blood pressure reading R03.0        Discussed BP log, started one with recent measurements and reviewed proper way to measure the BP at home.   Will bring BP device and log to f/u appt with PCP already scheduled for Tuesday.  Advised to return to care if any concerning symptoms that we discussed should develop.    Patient Instructions   Keep a blood pressure log as discussed at your visit tonight for BP check.   Bring the log with you to your appointment on Tuesday with your primary provider.  If any concerning symptoms develop -- for example, chest pain, vision changes, lightheaded, headaches, etc -- return to care right away.  Bring your new BP machine to the appointment on Tuesday along with the log. "       Patient Education     Uncontrolled High Blood Pressure (Established)    Your blood pressure was unusually high today. This can occur if you ve missed doses of your blood pressure medicine. Or it can happen if you are taking other medicines. These include some asthma inhalers, decongestants, diet pills, and street drugs like cocaine and amphetamine.  Other causes include:    Weight gain    More salt in your diet    Smoking    Caffeine  Your blood pressure can also rise if you are emotionally upset or in intense pain. It may go back to normal after a period of rest.  Blood pressure measurements are given as 2 numbers. Systolic blood pressure is the upper number. This is the pressure when the heart contracts. Diastolic blood pressure is the lower number. This is the pressure when the heart relaxes between beats. You will see your blood pressure readings written together. For example, a person with a systolic pressure of 118 and a diastolic pressure of 78 will have 118/78 written in the medical record. To be high blood pressure, the numbers must be higher when tested over a period of time.  Blood pressure is categorized as normal, elevated, or stage 1 or stage 2 high blood pressure:    Normal blood pressure is systolic of less than 120 and diastolic of less than 80 (120/80)    Elevated blood pressure is systolic of 120 to 129 and diastolic less than 80    Stage 1 high blood pressure is systolic is 130 to 139 or diastolic between 80 to 89    Stage 2 high blood pressure is when systolic is 140 or higher or the diastolic is 90 or higher  Uncontrolled high blood pressure can cause serious health problems. It raises your risk for heart attack, stroke, and heart failure. In general, if you have high blood pressure, keeping your blood pressure below 130/80 mmHg may help prevent these problems. Your healthcare provider may prescribe medicine to help control blood pressure if lifestyle changes are not enough.  Home  care  It s important to take steps to lower your blood pressure. If you are taking blood pressure medicine, the guidelines below may help you need less or no medicines in the future.    Start a weight-loss program if you are overweight.    Cut back on the amount of salt in your diet:  ? Avoid high-salt foods like olives, pickles, smoked meats, and salted potato chips.  ? Don t add salt to your food at the table.  ? Use only small amounts of salt when cooking.    Start an exercise program. Talk with your healthcare provider about what exercise program is best for you. It doesn t have to be difficult. Even brisk walking for 20 minutes 3 times a week is a good form of exercise.    Avoid medicines that stimulates the heart. This includes many over-the-counter cold and sinus decongestant pills and sprays, as well as diet pills. Check the warnings about high blood pressure on the label. Before purchasing any over-the-counter medicines or supplements, always ask the pharmacist about the product's potential interaction with your high blood pressure and your medicines.    Stimulants such as amphetamine or cocaine could be lethal for someone with high blood pressure. Never take these.    Limit how much caffeine you drink. Or switch to noncaffeinated beverages.    Stop smoking. If you are a long-time smoker, this can be hard. Enroll in a stop-smoking program to make it more likely that you will succeed. Talk with your provider about ways to quit.    Learn how to handle stress better. This is an important part of any program to lower blood pressure. Learn ways to relax. These include meditation, yoga, and biofeedback.    If medicines were prescribed, take them exactly as directed. Missing doses may cause your blood pressure to get out of control.    If you miss a dose or doses of your medicines, check with your healthcare provider or pharmacist about what to do.    Consider buying an automatic blood pressure machine. Your  provider may recommend a certain type. You can get one of these at most pharmacies. Measure your blood pressure twice a day, in the morning, and in the late afternoon. Keep a written record of your home blood pressure readings and take the record to your medical appointments.  Here are some additional guidelines on home blood pressure monitoring from the American Heart Association.    Don't smoke or drink coffee for 30 minutes    Go to the bathroom before the test.    Relax for 5 minutes before taking the measurement.    Sit correctly. Be sure your back is supported. Don't sit on a couch or soft chair. Uncross your feet and place them flat on the floor. Place your arm on a solid, flat surface like a table with the upper arm at heart level. Make certain the middle of the cuff is directly above the bend of the elbow. Check the monitor's instruction manual for an illustration.    Take multiple readings. When you measure, take 2 or 3 readings one minute apart and record all of the results.    Take your blood pressure at the same time every day, or as your healthcare provider recommends.    Record the date, time, and blood pressure reading.    Take the record with you to your next appointment. If your blood pressure monitor has a built-in memory, simply take the monitor with you to your next appointment.    Call your provider if you have several high readings. Don't be frightened by a single high reading, but if you get several high readings, check in with your healthcare provider.    Note: When blood pressure reaches a systolic (top number) of 180 or higher or a diastolic (bottom number) of 110 or higher, emergency medical treatment is required. Call your healthcare provider immediately.  Follow-up care  Regular visits to your own healthcare provider for blood pressure and medicine checks are an important part of your care. Make a follow-up appointment as directed. Bring the record of your home blood pressure readings  to the appointment.  When to seek medical advice  Call your healthcare provider right away if any of these occur:    Blood pressure reaches a systolic (top number) of 180 or higher or diastolic (bottom number) of 110 or higher, emergency medical treatment is required.    Chest, arm, shoulder, neck, or upper back pain    Shortness of breath    Severe headache    Throbbing or rushing sound in the ears    Nosebleed    Extreme drowsiness, confusion, or fainting    Dizziness or dizziness with spinning sensation (vertigo)    Weakness in an arm or leg or on one side of the face    Trouble speaking or seeing   Date Last Reviewed: 1/1/2017 2000-2018 [employer]. [address] [place], [place] [address]. All rights reserved. This information is not intended as a substitute for professional medical care. Always follow your healthcare professional's instructions.

## 2019-11-19 ENCOUNTER — OFFICE VISIT (OUTPATIENT)
Dept: FAMILY MEDICINE | Facility: CLINIC | Age: 69
End: 2019-11-19
Payer: COMMERCIAL

## 2019-11-19 VITALS
HEART RATE: 98 BPM | OXYGEN SATURATION: 96 % | SYSTOLIC BLOOD PRESSURE: 152 MMHG | BODY MASS INDEX: 28.06 KG/M2 | RESPIRATION RATE: 18 BRPM | TEMPERATURE: 97.6 F | HEIGHT: 62 IN | WEIGHT: 152.5 LBS | DIASTOLIC BLOOD PRESSURE: 88 MMHG

## 2019-11-19 DIAGNOSIS — E78.00 HYPERCHOLESTEROLEMIA: ICD-10-CM

## 2019-11-19 DIAGNOSIS — R73.01 IMPAIRED FASTING BLOOD SUGAR: ICD-10-CM

## 2019-11-19 DIAGNOSIS — E55.9 VITAMIN D DEFICIENCY: ICD-10-CM

## 2019-11-19 DIAGNOSIS — R53.83 FATIGUE, UNSPECIFIED TYPE: ICD-10-CM

## 2019-11-19 DIAGNOSIS — G47.30 SLEEP APNEA, UNSPECIFIED TYPE: ICD-10-CM

## 2019-11-19 DIAGNOSIS — I10 HYPERTENSION GOAL BP (BLOOD PRESSURE) < 140/90: Primary | ICD-10-CM

## 2019-11-19 DIAGNOSIS — E78.5 HYPERLIPIDEMIA LDL GOAL <130: ICD-10-CM

## 2019-11-19 LAB
BASOPHILS # BLD AUTO: 0 10E9/L (ref 0–0.2)
BASOPHILS NFR BLD AUTO: 0.7 %
CHOLEST SERPL-MCNC: 258 MG/DL
CREAT UR-MCNC: 75 MG/DL
DIFFERENTIAL METHOD BLD: NORMAL
EOSINOPHIL # BLD AUTO: 0.1 10E9/L (ref 0–0.7)
EOSINOPHIL NFR BLD AUTO: 1.8 %
ERYTHROCYTE [DISTWIDTH] IN BLOOD BY AUTOMATED COUNT: 14.1 % (ref 10–15)
HBA1C MFR BLD: 5.4 % (ref 0–5.6)
HCT VFR BLD AUTO: 44.1 % (ref 35–47)
HDLC SERPL-MCNC: 37 MG/DL
HGB BLD-MCNC: 14.4 G/DL (ref 11.7–15.7)
LDLC SERPL CALC-MCNC: 164 MG/DL
LYMPHOCYTES # BLD AUTO: 1.7 10E9/L (ref 0.8–5.3)
LYMPHOCYTES NFR BLD AUTO: 27 %
MCH RBC QN AUTO: 29.3 PG (ref 26.5–33)
MCHC RBC AUTO-ENTMCNC: 32.7 G/DL (ref 31.5–36.5)
MCV RBC AUTO: 90 FL (ref 78–100)
MICROALBUMIN UR-MCNC: 11 MG/L
MICROALBUMIN/CREAT UR: 14.48 MG/G CR (ref 0–25)
MONOCYTES # BLD AUTO: 0.8 10E9/L (ref 0–1.3)
MONOCYTES NFR BLD AUTO: 12.2 %
NEUTROPHILS # BLD AUTO: 3.6 10E9/L (ref 1.6–8.3)
NEUTROPHILS NFR BLD AUTO: 58.3 %
NONHDLC SERPL-MCNC: 221 MG/DL
PLATELET # BLD AUTO: 235 10E9/L (ref 150–450)
RBC # BLD AUTO: 4.91 10E12/L (ref 3.8–5.2)
TRIGL SERPL-MCNC: 284 MG/DL
TSH SERPL DL<=0.005 MIU/L-ACNC: 1.2 MU/L (ref 0.4–4)
WBC # BLD AUTO: 6.2 10E9/L (ref 4–11)

## 2019-11-19 PROCEDURE — 84443 ASSAY THYROID STIM HORMONE: CPT | Performed by: FAMILY MEDICINE

## 2019-11-19 PROCEDURE — 82043 UR ALBUMIN QUANTITATIVE: CPT | Performed by: FAMILY MEDICINE

## 2019-11-19 PROCEDURE — 99215 OFFICE O/P EST HI 40 MIN: CPT | Performed by: FAMILY MEDICINE

## 2019-11-19 PROCEDURE — 36415 COLL VENOUS BLD VENIPUNCTURE: CPT | Performed by: FAMILY MEDICINE

## 2019-11-19 PROCEDURE — 85025 COMPLETE CBC W/AUTO DIFF WBC: CPT | Performed by: FAMILY MEDICINE

## 2019-11-19 PROCEDURE — 83036 HEMOGLOBIN GLYCOSYLATED A1C: CPT | Performed by: FAMILY MEDICINE

## 2019-11-19 PROCEDURE — 80061 LIPID PANEL: CPT | Performed by: FAMILY MEDICINE

## 2019-11-19 PROCEDURE — 82306 VITAMIN D 25 HYDROXY: CPT | Performed by: FAMILY MEDICINE

## 2019-11-19 RX ORDER — CARVEDILOL 6.25 MG/1
6.25 TABLET ORAL 2 TIMES DAILY WITH MEALS
Qty: 60 TABLET | Refills: 1 | Status: SHIPPED | OUTPATIENT
Start: 2019-11-19 | End: 2019-11-26 | Stop reason: DRUGHIGH

## 2019-11-19 ASSESSMENT — MIFFLIN-ST. JEOR: SCORE: 1156.02

## 2019-11-19 NOTE — PATIENT INSTRUCTIONS
Health Maintenance Due   Topic Date Due     DEXA  1950     ZOSTER IMMUNIZATION (1 of 2) 10/13/1998     MEDICARE ANNUAL WELLNESS VISIT  06/05/2014     MICROALBUMIN  06/05/2014     LIPID  01/03/2018     MAMMO SCREENING  05/17/2018     A1C  10/03/2018     INFLUENZA VACCINE (1) 09/01/2019      Patient Education     Controlling High Blood Pressure  High blood pressure (hypertension) is often called the silent killer. This is because many people who have it don t know it. High blood pressure can raise your risk of heart attack, stroke, and heart failure. Controlling your blood pressure can decrease your risk of these problems. Know your blood pressure and remember to check it regularly. Doing so can save your life.  Blood pressure measurements are given as 2 numbers. Systolic blood pressure is the upper number. This is the pressure when the heart contracts. Diastolic blood pressure is the lower number. This is the pressure when the heart relaxes between beats.  Blood pressure is categorized as normal, elevated, or stage 1 or stage 2 high blood pressure:    Normal blood pressure is systolic of less than 120 and diastolic of less than 80 (120/80)    Elevated blood pressure is systolic of 120 to 129 and diastolic less than 80    Stage 1 high blood pressure is systolic is 130 to 139 or diastolic between 80 to 89    Stage 2 high blood pressure is when systolic is 140 or higher or the diastolic is 90 or higher  Here are some things you can do to help control your blood pressure.    Choose heart-healthy foods    Select low-salt, low-fat foods. Limit sodium intake to 2,400 mg per day or the amount suggested by your healthcare provider.    Limit canned, dried, cured, packaged, and fast foods. These can contain a lot of salt.    Eat 8 to 10 servings of fruits and vegetables every day.    Choose lean meats, fish, or chicken.    Eat whole-grain pasta, brown rice, and beans.    Eat 2 to 3 servings of low-fat or fat-free dairy  products.    Ask your doctor about the DASH eating plan. This plan helps reduce blood pressure.    When you go to a restaurant, ask that your meal be prepared with no added salt.  Maintain a healthy weight    Ask your healthcare provider how many calories to eat a day. Then stick to that number.    Ask your healthcare provider what weight range is healthiest for you. If you are overweight, a weight loss of only 3% to 5% of your body weight can help lower blood pressure. Generally, a good weight loss goal is to lose 10% of your body weight in a year.    Limit snacks and sweets.    Get regular exercise.  Get up and get active    Choose activities you enjoy. Find ones you can do with friends or family. This includes bicycling, dancing, walking, and jogging.    Park farther away from building entrances.    Use stairs instead of the elevator.    When you can, walk or bike instead of driving.    [place] leaves, garden, or do household repairs.    Be active at a moderate to vigorous level of physical activity for at least 40 minutes for a minimum of 3 to 4 days a week.   Manage stress    Make time to relax and enjoy life. Find time to laugh.    Communicate your concerns with your loved ones and your healthcare provider.    Visit with family and friends, and keep up with hobbies.  Limit alcohol and quit smoking    Men should have no more than 2 drinks per day.    Women should have no more than 1 drink per day.    Talk with your healthcare provider about quitting smoking. Smoking significantly increases your risk for heart disease and stroke. Ask your healthcare provider about community smoking cessation programs and other options.  Medicines  If lifestyle changes aren t enough, your healthcare provider may prescribe high blood pressure medicine. Take all medicines as prescribed. If you have any questions about your medicines, ask your healthcare provider before stopping or changing them.   Date Last Reviewed: 4/27/2016     [id number] [employer]. [address] [place] [address], [place] [address]. All rights reserved. This information is not intended as a substitute for professional medical care. Always follow your healthcare professional's instructions.

## 2019-11-19 NOTE — PROGRESS NOTES
"Subjective     Teetee Dalal is a 69 year old female who presents to clinic today for the following health issues:    HPI   Hypertension Follow-up      Do you check your blood pressure regularly outside of the clinic? Yes     She'd been checking home blood pressures and noted sbp's up to 180 without symptoms of headache, vision changes, chest pain.    She has noticed more fatigue    Are you following a low salt diet? Yes    Are your blood pressures ever more than 140 on the top number (systolic) OR more   than 90 on the bottom number (diastolic), for example 140/90? Yes      How many servings of fruits and vegetables do you eat daily?  4 or more    On average, how many sweetened beverages do you drink each day (soda, juice, sweet tea, etc)?   0    How many days per week do you miss taking your medication? 0    BP Readings from Last 3 Encounters:   11/19/19 (!) 152/88   11/16/19 (!) 173/102   04/04/19 (!) 144/106      GFR Estimate   Date Value Ref Range Status   04/04/2019 88 >60 mL/min/[1.73_m2] Final     Comment:     Non  GFR Calc  Starting 12/18/2018, serum creatinine based estimated GFR (eGFR) will be   calculated using the Chronic Kidney Disease Epidemiology Collaboration   (CKD-EPI) equation.     12/20/2018 86 >60 mL/min/[1.73_m2] Final     Comment:     Non  GFR Calc  Starting 12/18/2018, serum creatinine based estimated GFR (eGFR) will be   calculated using the Chronic Kidney Disease Epidemiology Collaboration   (CKD-EPI) equation.     10/03/2017 >90 >60 mL/min/1.7m2 Final     Comment:     Non  GFR Calc     Fatigue      Duration: months, getting worse with time    Description  Doesn't feel refreshed in the morning, her partner has noted apneic episodes \"always\", for years that occur every night  Possibly aggravated/caused by chronic post nasal drip that causes congestion and she feels contributes to congestion  She's used nasal sprays in the past which have " helped, but she got frustrated because symptoms continued  She's also tried Allegra, but it didn't help  Concerns about sleep apnea due to increase fatigue within the last few months and body temp changes.  She doesn't necessarily want a sleep study, but would like to discuss symptoms with a sleep specialist.    Patient Active Problem List   Diagnosis     Other specified genital prolapse(618.89)     Skin lesion of chest wall     Skin lesion of right arm     Advance Care Planning     Hypercholesterolemia     Hyperlipidemia LDL goal <130     Overweight (BMI 25.0-29.9)     Environmental allergies     Intraductal carcinoma, noninfiltrating     Impaired fasting blood sugar     Vitamin D deficiency     Hearing loss in right ear     Pelvic relaxation     Family history of colon cancer     Skin lesion of left arm     Fatigue, unspecified type     Need for hepatitis C screening test     Hx of colonic polyps     Hypertropia     Intermittent esotropia, alternating     Personal history of malignant neoplasm of breast     Superior oblique palsy     Benign essential hypertension     Past Surgical History:   Procedure Laterality Date     HYSTERECTOMY TOTAL ABDOMINAL, BILATERAL SALPINGO-OOPHORECTOMY, COMBINED  2/18/2004    lumpectomy left breast - [place]     LUMPECTOMY BREAST  1996     SURGICAL HISTORY OF -   9/03    hyperplastic colonic polyps - benign       Social History     Tobacco Use     Smoking status: Former Smoker     Types: Cigarettes     Smokeless tobacco: Never Used   Substance Use Topics     Alcohol use: Yes     Comment: moderate     Family History   Problem Relation Age of Onset     Hypertension Mother      Breast Cancer Mother         12 years ago     Depression Mother      Psychotic Disorder Mother      Diabetes Father      Hypertension Father      Alcohol/Drug Father          Allergies   Allergen Reactions     No Known Drug Allergies      Recent Labs   Lab Test 11/19/19  1031 04/04/19  1319 12/20/18  1503  "10/03/17  1240 06/05/13  0808  01/20/12  1011   A1C 5.4  --   --  5.5  --   --   --    LDL  --   --   --  130* 139*  --  200*   HDL  --   --   --  38* 38*  --  45*   TRIG  --   --   --  347* 130  --  87   ALT  --  27  --  31  --   --   --    CR  --  0.68 0.71 0.64 0.55  --  0.59   GFRESTIMATED  --  88 86 >90 >90  --  >90   GFRESTBLACK  --  >90 >90 >90 >90  --  >90   POTASSIUM  --  4.0 3.6 4.2 3.6  --  4.1   TSH  --   --   --  1.10 1.37   < >  --     < > = values in this interval not displayed.      BP Readings from Last 3 Encounters:   11/19/19 (!) 152/88   11/16/19 (!) 173/102   04/04/19 (!) 144/106    Wt Readings from Last 3 Encounters:   11/19/19 69.2 kg (152 lb 8 oz)   11/16/19 69.9 kg (154 lb)   12/20/18 68.9 kg (152 lb)         Reviewed and updated as needed this visit by Provider         Review of Systems   ROS COMP: Constitutional, HEENT, cardiovascular, pulmonary, GI, , musculoskeletal, neuro, skin, endocrine and psych systems are negative, except as otherwise noted.      Objective    BP (!) 152/88 (BP Location: Right arm, Patient Position: Sitting, Cuff Size: Adult Regular)   Pulse 98   Temp 97.6  F (36.4  C) (Oral)   Resp 18   Ht 1.568 m (5' 1.75\")   Wt 69.2 kg (152 lb 8 oz)   LMP  (LMP Unknown)   SpO2 96%   Breastfeeding No   BMI 28.12 kg/m    Body mass index is 28.12 kg/m .  Physical Exam   GENERAL: healthy, alert and no distress  EYES: Eyes grossly normal to inspection, PERRL and conjunctivae and sclerae normal  HENT: ear canals and TM's normal, nose and mouth without ulcers or lesions  NECK: no adenopathy, no asymmetry, masses, or scars and thyroid normal to palpation  RESP: lungs clear to auscultation - no rales, rhonchi or wheezes  CV: regular rate and rhythm, normal S1 S2, no S3 or S4, no murmur, click or rub, no peripheral edema and peripheral pulses strong  MS: no gross musculoskeletal defects noted, no edema  SKIN: no suspicious lesions or rashes  NEURO: Normal strength and tone, " mentation intact and speech normal  PSYCH: mentation appears normal and anxious    Diagnostic Test Results:  Labs reviewed in Epic  Results for orders placed or performed in visit on 11/19/19 (from the past 24 hour(s))   Hemoglobin A1c   Result Value Ref Range    Hemoglobin A1C 5.4 0 - 5.6 %   CBC with platelets differential   Result Value Ref Range    WBC 6.2 4.0 - 11.0 10e9/L    RBC Count 4.91 3.8 - 5.2 10e12/L    Hemoglobin 14.4 11.7 - 15.7 g/dL    Hematocrit 44.1 35.0 - 47.0 %    MCV 90 78 - 100 fl    MCH 29.3 26.5 - 33.0 pg    MCHC 32.7 31.5 - 36.5 g/dL    RDW 14.1 10.0 - 15.0 %    Platelet Count 235 150 - 450 10e9/L    % Neutrophils 58.3 %    % Lymphocytes 27.0 %    % Monocytes 12.2 %    % Eosinophils 1.8 %    % Basophils 0.7 %    Absolute Neutrophil 3.6 1.6 - 8.3 10e9/L    Absolute Lymphocytes 1.7 0.8 - 5.3 10e9/L    Absolute Monocytes 0.8 0.0 - 1.3 10e9/L    Absolute Eosinophils 0.1 0.0 - 0.7 10e9/L    Absolute Basophils 0.0 0.0 - 0.2 10e9/L    Diff Method Automated Method            Assessment & Plan     1. Hypertension goal BP (blood pressure) < 140/90  BP Readings from Last 3 Encounters:   11/19/19 (!) 152/88   11/16/19 (!) 173/102   04/04/19 (!) 144/106    not at goal, start medication as below  I do feel there is comorbid anxiety, so recommended BB today, discussed possible side effects of depressed mood and fatigue  - Albumin Random Urine Quantitative with Creat Ratio  - carvedilol (COREG) 6.25 MG tablet; Take 1 tablet (6.25 mg) by mouth 2 times daily (with meals)  Dispense: 60 tablet; Refill: 1    2. Hypercholesterolemia  LDL Cholesterol Calculated   Date Value Ref Range Status   10/03/2017 130 (H) <100 mg/dL Final     Comment:     Above desirable:  100-129 mg/dl  Borderline High:  130-159 mg/dL  High:             160-189 mg/dL  Very high:       >189 mg/dl      almost at goal previously, will recheck today and make recommendations as needed  - Lipid panel reflex to direct LDL Fasting    3. Impaired  "fasting blood sugar  Screen for diabetes; normal today  - Hemoglobin A1c    4. Vitamin D deficiency  recheck    5. Sleep apnea, unspecified type  With fatigue  6. Fatigue, unspecified type  Significant time discussing possible diagnosis of HIRAM, usual recommended treatments, cpap machines, effect on body, etc  She will consider sleep doctor evaluation, which I do recommend.  I will rule out thyroid disease, anemia, vitamin D deficiency.  I will follow up on abnormal labs as indicated.  If no medical cause determined, will recommend lifestyle changes (more exercise, weight loss) and consider an antidepressant as well.    - SLEEP EVALUATION & MANAGEMENT REFERRAL - ADULT -[facility] [place] [phone] (Age 15 and up), [facility] [place]  [phone] (Age 15 and up); Future  - Vitamin D Deficiency  - TSH with free T4 reflex  - CBC with platelets differential    7. Vitamin D deficiency, unspecified   recheck  - Vitamin D Deficiency    More than 50% of this 30 minute visit was spent counseling pt and coordinating care as noted above.          BMI:   Estimated body mass index is 28.12 kg/m  as calculated from the following:    Height as of this encounter: 1.568 m (5' 1.75\").    Weight as of this encounter: 69.2 kg (152 lb 8 oz).   Return in about 13 days (around 12/2/2019) for Physical Exam.    Sandy Hargrove MD  [facility]        "

## 2019-11-19 NOTE — RESULT ENCOUNTER NOTE
Hello!  It was a pleasure to see you in clinic!  Thank you for getting labs done. Some are already back and look great!    Your cbc, or complete blood count, which measures red blood cells (to check for anemia and other vitamin deficiencies) and white blood cells (to check for infection and leukemia) is normal, which is great!  Your platelets, which reflect liver function and ability to clot, are normal as well.      Your lab test to check for diabetes, HgA1C, also called glycosylated hemoglobin, which measures the level of sugar in your blood over the past few months, is normal which is great! Congratulations, you don't have diabetes!      Your other labs are still pending, I will let you know the results as I get them.     If you have any questions, please contact the clinic or schedule an appointment with me, thank you!    Sincerely,    SILVIA AVALOS MD   11/19/2019

## 2019-11-20 LAB — DEPRECATED CALCIDIOL+CALCIFEROL SERPL-MC: 15 UG/L (ref 20–75)

## 2019-11-21 DIAGNOSIS — E55.9 VITAMIN D DEFICIENCY: Primary | ICD-10-CM

## 2019-11-21 RX ORDER — ERGOCALCIFEROL 1.25 MG/1
50000 CAPSULE, LIQUID FILLED ORAL WEEKLY
Qty: 12 CAPSULE | Refills: 0 | Status: SHIPPED | OUTPATIENT
Start: 2019-11-21 | End: 2020-04-07

## 2019-11-21 NOTE — RESULT ENCOUNTER NOTE
All your labs are back!    Your vitamin D level is still too low.    As you may know, vitamin D deficiency is associated with many medical problems including osteoporosis, muscles aches and pains, weakness and falls.  Maintaining adequate levels is very important for good health.  During winter months (October through March), people in northern climates are not able to synthesize vitamin D from sunlight and therefore have higher rates of deficiency due to inadquate oral intake. Vitamin D deficiency is very common in [place]!    I recommend taking a high dose supplement for 3 months, 50,000 units once per week.  You could consider rechecking your level when you have finished this course. I will send a prescription to your pharmacy.     After you are done with the high dose,  I recommend taking a daily supplement of 4000 units daily.      Your microalbumen is normal, which is great news. This means you do not have protein in the urine, and that your kidneys are currently functioning well. Keeping blood pressure and blood fats low is the best way to preserve your kidney function over your lifetime.     Your cbc, or complete blood count, which measures red blood cells (to check for anemia and other vitamin deficiencies) and white blood cells (to check for infection and leukemia) is normal, which is great!  Your platelets, which reflect liver function and ability to clot, are normal as well.     Your thyroid function is normal, which is good news.  This means that you do not have hyperthyroidism or hypothyroidism.     Your total cholesterol, triglycerides and LDL cholesterol are still too high and your HDL, or good cholesterol, is too low, increasing your heart disease/stroke risk.  The 10-year ASCVD risk score (Sanjuanita ALPA Jr., et al., 2013) is: 21.5%  Please consider stating a cholesterol lowering medication.    If you have any questions, please contact the clinic or schedule an appointment with me, thank  you!    Sincerely,    Sandy Hargrove MD

## 2019-11-26 ENCOUNTER — MYC MEDICAL ADVICE (OUTPATIENT)
Dept: FAMILY MEDICINE | Facility: CLINIC | Age: 69
End: 2019-11-26

## 2019-11-26 DIAGNOSIS — I10 HYPERTENSION GOAL BP (BLOOD PRESSURE) < 140/90: Primary | ICD-10-CM

## 2019-11-26 RX ORDER — CARVEDILOL 12.5 MG/1
12.5 TABLET ORAL 2 TIMES DAILY WITH MEALS
Qty: 60 TABLET | Refills: 1 | Status: SHIPPED | OUTPATIENT
Start: 2019-11-26 | End: 2019-12-19

## 2019-12-03 NOTE — TELEPHONE ENCOUNTER
BP Readings from Last 6 Encounters:   11/19/19 (!) 152/88   11/16/19 (!) 173/102   04/04/19 (!) 144/106   12/20/18 146/90   10/25/17 120/76   10/03/17 (!) 156/103     Coreg 6.25 mg twice daily started on 11/19/19     Dr Hargrove, What do you advise?    Pt reporting continued high readings despite Coreg. See her note please.    Martha Talbot RN\    
Dr. Iggy Allen,  Spoke with patient, she states she does not routinely record her pulse readings.     She did take a BP while I was talking with her.   BP- 140/82  P- 72    Pharmacy cued    Please advise    Thank You!  Melina Benitez, RN  Triage Nurse    
I recommend increasing the dose, from 6.25 twice daily to 12.5 twice daily. I did send a new prescription to our pharmacy at clinic, but meanwhile she can just take #2 of the tablets she has at home every morning and bedtime, and keep monitoring her blood pressures to see if they respond. This is pretty typical, usually I start at the very lowest dose of a medication to avoid overmedicating, and then have to increase as needed.    ASSESSMENT / PLAN:  (I10) Hypertension goal BP (blood pressure) < 140/90  (primary encounter diagnosis)  Comment:   Plan: carvedilol (COREG) 12.5 MG tablet        Po bid     
MD comments relayed to pt    Martha Talbot RN, BSN       
Patient would like a call back to discuss BP. Patient states that it has gone down a bit but its not where she'd like it to be. BP yesterday was 150/80. Patient also thinks she has a UTI & declined an OV with other providers. Please advise, thank you!    Kenyetta Gambino       
Pt is close to goal of <140/90 with her recent blood pressure. I would be hesitant to increase Coreg from 25 mg daily to 50 mg daily. I would recommend pt increase activity and limit her salt intake. Continue to monitor B/P and bring log with her to her next appt on 12/19/19.   DM    
Sandy Hargrove MD/LIANA:  Spoke with patient who reports her BP remains elevated despite using increased dose of carvedilol (COREG) (12.5 mg BID)    BP ranges 140-150/70. Patient c/o mild headache but denies other red flag symptoms.     Patient does have physical scheduled 12/19/2019.     1. She is wondering what she should do to get BP controlled in the meantime. - Pharmacy cued    2 Patient also states she started taking red yeast rice and wonders if this could cause elevated BP?     Patient also c/o UTI symptoms. Patient reports urinary frequency and burning. Denies fever; back/side/flank pain; urgency or change in urine color/smell. Advised increased fluid intake; cranberry juice and OTC Azo for symptom relief. Writer also advised patient to do E-Visit to address symptoms, instructions sent via [employer] per patient request    Please advise    Thank You!  Melina Benitez, MACARIO  Triage Nurse  
Sent this info to cb Julien RN    
We can increase the dose further and have B/P rechecked during OV with Dr. Hargrove.  Can patient also send her pulse readings prior to increasing dose.   Agree with e-visit or OV for UTI symptoms.   DM  
Writer called patient and reviewed message per Dr. Allen.    Patient verbalized understanding and in agreement with plan.  AYDEN ChahalN, RN    
[place] all pertinent systems normal

## 2019-12-04 ENCOUNTER — OFFICE VISIT (OUTPATIENT)
Dept: URGENT CARE | Facility: URGENT CARE | Age: 69
End: 2019-12-04
Payer: COMMERCIAL

## 2019-12-04 ENCOUNTER — TELEPHONE (OUTPATIENT)
Dept: FAMILY MEDICINE | Facility: CLINIC | Age: 69
End: 2019-12-04

## 2019-12-04 VITALS
TEMPERATURE: 97.8 F | WEIGHT: 152 LBS | HEIGHT: 62 IN | HEART RATE: 70 BPM | BODY MASS INDEX: 27.97 KG/M2 | DIASTOLIC BLOOD PRESSURE: 72 MMHG | SYSTOLIC BLOOD PRESSURE: 122 MMHG | RESPIRATION RATE: 14 BRPM

## 2019-12-04 DIAGNOSIS — N30.00 ACUTE CYSTITIS WITHOUT HEMATURIA: Primary | ICD-10-CM

## 2019-12-04 DIAGNOSIS — R53.83 OTHER FATIGUE: ICD-10-CM

## 2019-12-04 DIAGNOSIS — R30.0 DYSURIA: ICD-10-CM

## 2019-12-04 DIAGNOSIS — I10 HYPERTENSION, WELL CONTROLLED: ICD-10-CM

## 2019-12-04 DIAGNOSIS — R82.90 NONSPECIFIC FINDING ON EXAMINATION OF URINE: ICD-10-CM

## 2019-12-04 LAB
ALBUMIN UR-MCNC: 30 MG/DL
APPEARANCE UR: ABNORMAL
BACTERIA #/AREA URNS HPF: ABNORMAL /HPF
BILIRUB UR QL STRIP: NEGATIVE
COLOR UR AUTO: YELLOW
GLUCOSE UR STRIP-MCNC: NEGATIVE MG/DL
HGB UR QL STRIP: ABNORMAL
KETONES UR STRIP-MCNC: NEGATIVE MG/DL
LEUKOCYTE ESTERASE UR QL STRIP: ABNORMAL
NITRATE UR QL: POSITIVE
NON-SQ EPI CELLS #/AREA URNS LPF: ABNORMAL /LPF
PH UR STRIP: 7 PH (ref 5–7)
RBC #/AREA URNS AUTO: ABNORMAL /HPF
SOURCE: ABNORMAL
SP GR UR STRIP: 1.01 (ref 1–1.03)
TRANS CELLS #/AREA URNS HPF: ABNORMAL /HPF
UROBILINOGEN UR STRIP-ACNC: 0.2 EU/DL (ref 0.2–1)
WBC #/AREA URNS AUTO: >100 /HPF

## 2019-12-04 PROCEDURE — 87186 SC STD MICRODIL/AGAR DIL: CPT | Performed by: INTERNAL MEDICINE

## 2019-12-04 PROCEDURE — 81001 URINALYSIS AUTO W/SCOPE: CPT | Performed by: INTERNAL MEDICINE

## 2019-12-04 PROCEDURE — 87086 URINE CULTURE/COLONY COUNT: CPT | Performed by: INTERNAL MEDICINE

## 2019-12-04 PROCEDURE — 87088 URINE BACTERIA CULTURE: CPT | Performed by: INTERNAL MEDICINE

## 2019-12-04 PROCEDURE — 99214 OFFICE O/P EST MOD 30 MIN: CPT

## 2019-12-04 RX ORDER — CIPROFLOXACIN 500 MG/1
500 TABLET, FILM COATED ORAL 2 TIMES DAILY
Qty: 14 TABLET | Refills: 0 | Status: SHIPPED | OUTPATIENT
Start: 2019-12-04 | End: 2019-12-19

## 2019-12-04 ASSESSMENT — ENCOUNTER SYMPTOMS
FEVER: 0
CHILLS: 1

## 2019-12-04 ASSESSMENT — MIFFLIN-ST. JEOR: SCORE: 1153.75

## 2019-12-04 NOTE — PATIENT INSTRUCTIONS
cipro antibiotics 2 x day 1 week  Probiotics    Monitor fever or other symptoms     blood pressure looks good.    Fatigue could be uti or potentially new blood pressure pill  Recheck 2 weeks with clinic

## 2019-12-04 NOTE — TELEPHONE ENCOUNTER
Reason for call:  Order   Order or referral being requested: pt called wants to see if her dr or a dr can put in a order for a UTI  Reason for request: UTI  Date needed: as soon as possible  Has the patient been seen by the PCP for this problem? YES    Additional comments: pt wants a call back asap if she can come in to leave a sample for a uti    Phone number to reach patient:  Home number on file [phone] (home)    Best Time:  any    Can we leave a detailed message on this number?  YES

## 2019-12-04 NOTE — PROGRESS NOTES
SUBJECTIVE:   Teetee Dalal is a 69 year old female presenting with a chief complaint of   Chief Complaint   Patient presents with     Urgent Care     UTI     frequency and burning x 5 days. feeling very fatigued. Chills last night.        She is an established patient of [facility].    UTI    Onset of symptoms was 5day(s).  Current and associated symptoms frequency, burning and voiding in small amounts  Treatment and measures tried None  Predisposing factors include last UTI 5 years ago  Patient denies flank pain, temperature > 101 degrees F. and vomiting        complains of fatigue  On new blood pressure for few weeks        Review of Systems   Constitutional: Positive for chills. Negative for fever.       Past Medical History:   Diagnosis Date     Breast cancer (H) 9/10/96     Change in stool habits 7/5/2016     Elevated blood pressure 1/20/2012     Elevated blood pressure reading without diagnosis of hypertension 10/3/2017     Encounter for screening colonoscopy 6/17/2011    High risk, recommended every 5 years, see scan 6/6/11, nromal  (Problem list name updated by automated process. Provider to review and confirm.)     Hearing loss      Hyperlipidemia LDL goal <130 2/18/2011    [place] risk 5% intermediate age 60      Hypertension     On occasion     Intraductal carcinoma, noninfiltrating 4/3/2013    Noted by cancer registry, I'm not sure who is following her, see scan 4/3/2013      Nasal congestion      Screening colonoscopy 6/17/2011     Family History   Problem Relation Age of Onset     Hypertension Mother      Breast Cancer Mother         12 years ago     Depression Mother      Psychotic Disorder Mother      Diabetes Father      Hypertension Father      Alcohol/Drug Father      Current Outpatient Medications   Medication Sig Dispense Refill     carvedilol (COREG) 12.5 MG tablet Take 1 tablet (12.5 mg) by mouth 2 times daily (with meals) 60 tablet 1     ciprofloxacin (CIPRO) 500 MG tablet Take 1 tablet  "(500 mg) by mouth 2 times daily for 7 days 14 tablet 0     Red Yeast Rice Extract (RED YEAST RICE PO) Take 2 capsule daily       vitamin D2 (ERGOCALCIFEROL) 48829 units (1250 mcg) capsule Take 1 capsule (50,000 Units) by mouth once a week for 12 doses 12 capsule 0     benzonatate (TESSALON) 200 MG capsule Take 1 capsule (200 mg) by mouth 3 times daily as needed for cough (Patient not taking: Reported on 11/16/2019) 21 capsule 0     Cyanocobalamin (B-12) 1000 MCG CAPS Take 1,000 mcg by mouth daily (Patient not taking: Reported on 11/16/2019) 100 capsule 3     Social History     Tobacco Use     Smoking status: Former Smoker     Types: Cigarettes     Smokeless tobacco: Never Used   Substance Use Topics     Alcohol use: Yes     Comment: moderate       OBJECTIVE  /72   Pulse 70   Temp 97.8  F (36.6  C) (Oral)   Resp 14   Ht 1.568 m (5' 1.75\")   Wt 68.9 kg (152 lb)   LMP  (LMP Unknown)   BMI 28.03 kg/m      Physical Exam  Vitals signs reviewed.   Constitutional:       Appearance: Normal appearance.   Abdominal:      Tenderness: There is no abdominal tenderness. There is no right CVA tenderness or left CVA tenderness.   Neurological:      Mental Status: She is alert.       *  Labs:  Results for orders placed or performed in visit on 12/04/19 (from the past 24 hour(s))   *UA reflex to Microscopic and Culture ([place] and [facility] (except [place] and [place])   Result Value Ref Range    Color Urine Yellow     Appearance Urine Cloudy     Glucose Urine Negative NEG^Negative mg/dL    Bilirubin Urine Negative NEG^Negative    Ketones Urine Negative NEG^Negative mg/dL    Specific Gravity Urine 1.015 1.003 - 1.035    Blood Urine Large (A) NEG^Negative    pH Urine 7.0 5.0 - 7.0 pH    Protein Albumin Urine 30 (A) NEG^Negative mg/dL    Urobilinogen Urine 0.2 0.2 - 1.0 EU/dL    Nitrite Urine Positive (A) NEG^Negative    Leukocyte Esterase Urine Large (A) NEG^Negative    Source Midstream Urine    Urine Microscopic "   Result Value Ref Range    WBC Urine >100 (A) OTO5^0 - 5 /HPF    RBC Urine 2-5 (A) OTO2^O - 2 /HPF    Squamous Epithelial /LPF Urine Moderate (A) FEW^Few /LPF    Transitional Epi Few FEW^Few /HPF    Bacteria Urine Many (A) NEG^Negative /HPF           ASSESSMENT:      ICD-10-CM    1. Acute cystitis without hematuria N30.00 ciprofloxacin (CIPRO) 500 MG tablet   2. Dysuria R30.0 *UA reflex to Microscopic and Culture ([place] and [facility] (except [place] and [place])     Urine Microscopic   3. Nonspecific finding on examination of urine R82.90 Urine Culture Aerobic Bacterial   4. Hypertension, well controlled I10    5. Other fatigue R53.83       Stop red rice yeast until feel better    Patient Instructions   cipro antibiotics 2 x day 1 week  Probiotics    Monitor fever or other symptoms     blood pressure looks good.    Fatigue could be uti or potentially new blood pressure pill  Recheck 2 weeks with clinic

## 2019-12-04 NOTE — TELEPHONE ENCOUNTER
Team coordinators-Please review options for treatment with patient as symptoms require provider evaluation:  1. Office visit/Urgent Care  2. eVisit  3. [email or web address]    Per 11/26/19 MyChart encounter patient was advised to request eVisit.    Thank you!  NASRA Canseco, AYDENN, RN

## 2019-12-06 LAB
BACTERIA SPEC CULT: ABNORMAL
BACTERIA SPEC CULT: ABNORMAL
SPECIMEN SOURCE: ABNORMAL

## 2019-12-19 ENCOUNTER — OFFICE VISIT (OUTPATIENT)
Dept: FAMILY MEDICINE | Facility: CLINIC | Age: 69
End: 2019-12-19
Payer: COMMERCIAL

## 2019-12-19 VITALS
TEMPERATURE: 97.8 F | HEART RATE: 57 BPM | WEIGHT: 153.5 LBS | BODY MASS INDEX: 28.25 KG/M2 | HEIGHT: 62 IN | RESPIRATION RATE: 16 BRPM | OXYGEN SATURATION: 94 % | DIASTOLIC BLOOD PRESSURE: 70 MMHG | SYSTOLIC BLOOD PRESSURE: 120 MMHG

## 2019-12-19 DIAGNOSIS — Z12.31 ENCOUNTER FOR SCREENING MAMMOGRAM FOR MALIGNANT NEOPLASM OF BREAST: ICD-10-CM

## 2019-12-19 DIAGNOSIS — Z78.0 ASYMPTOMATIC MENOPAUSAL STATE: ICD-10-CM

## 2019-12-19 DIAGNOSIS — I10 HYPERTENSION GOAL BP (BLOOD PRESSURE) < 140/90: ICD-10-CM

## 2019-12-19 DIAGNOSIS — Z00.00 ENCOUNTER FOR MEDICARE ANNUAL WELLNESS EXAM: Primary | ICD-10-CM

## 2019-12-19 DIAGNOSIS — E78.00 HYPERCHOLESTEROLEMIA: ICD-10-CM

## 2019-12-19 DIAGNOSIS — E55.9 VITAMIN D DEFICIENCY: ICD-10-CM

## 2019-12-19 DIAGNOSIS — Z23 FLU VACCINE NEED: ICD-10-CM

## 2019-12-19 PROCEDURE — 99213 OFFICE O/P EST LOW 20 MIN: CPT | Mod: 25 | Performed by: FAMILY MEDICINE

## 2019-12-19 PROCEDURE — 90662 IIV NO PRSV INCREASED AG IM: CPT | Performed by: FAMILY MEDICINE

## 2019-12-19 PROCEDURE — G0438 PPPS, INITIAL VISIT: HCPCS | Performed by: FAMILY MEDICINE

## 2019-12-19 PROCEDURE — G0008 ADMIN INFLUENZA VIRUS VAC: HCPCS | Performed by: FAMILY MEDICINE

## 2019-12-19 RX ORDER — CARVEDILOL 25 MG/1
25 TABLET ORAL 2 TIMES DAILY WITH MEALS
Qty: 60 TABLET | Refills: 3 | Status: SHIPPED | OUTPATIENT
Start: 2019-12-19 | End: 2020-03-19 | Stop reason: SINTOL

## 2019-12-19 SDOH — HEALTH STABILITY: MENTAL HEALTH: HOW MANY STANDARD DRINKS CONTAINING ALCOHOL DO YOU HAVE ON A TYPICAL DAY?: 1 OR 2

## 2019-12-19 SDOH — HEALTH STABILITY: MENTAL HEALTH: HOW OFTEN DO YOU HAVE 6 OR MORE DRINKS ON ONE OCCASION?: NEVER

## 2019-12-19 SDOH — HEALTH STABILITY: MENTAL HEALTH: HOW OFTEN DO YOU HAVE A DRINK CONTAINING ALCOHOL?: 2-4 TIMES A MONTH

## 2019-12-19 ASSESSMENT — MIFFLIN-ST. JEOR: SCORE: 1160.55

## 2019-12-19 NOTE — PROGRESS NOTES
"  SUBJECTIVE:   Teetee Dalal is a 69 year old female who presents for Preventive Visit and hypertension follow up.    Hypertension Follow-up      Do you check your blood pressure regularly outside of the clinic? Yes     Are you following a low salt diet? Yes    Are your blood pressures ever more than 140 on the top number (systolic) OR more   than 90 on the bottom number (diastolic), for example 140/90? Yes   Usually 150's/80's - 90's one time had a value of 191/95 after being in car all day on long road trip, no extra caffeine or any other factors noted, no symptoms when it was elevated.    BP Readings from Last 3 Encounters:   12/19/19 120/70   12/04/19 122/72   11/19/19 (!) 152/88          Are you in the first 12 months of your Medicare Part B coverage?  No    Physical Health:    In general, how would you rate your overall physical health? good    Outside of work, how many days during the week do you exercise? 4-5 days/week    Outside of work, approximately how many minutes a day do you exercise?15-30 minutes    If you drink alcohol do you typically have >3 drinks per day or >7 drinks per week? No    Do you usually eat at least 4 servings of fruit and vegetables a day, include whole grains & fiber and avoid regularly eating high fat or \"junk\" foods? Yes    Do you have any problems taking medications regularly?  No    Do you have any side effects from medications? none    Needs assistance for the following daily activities: no assistance needed    Which of the following safety concerns are present in your home?  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    In the past 6 months, have you been bothered by leaking of urine? no    Mental Health:    In general, how would you rate your overall mental or emotional health? good  PHQ-2 Score:      Do you feel safe in your environment? Yes    Have you ever done Advance Care Planning? (For example, a Health Directive, POLST, or a discussion with a " medical provider or your loved ones about your wishes): Yes, advance care planning is on file.    Fall risk:  Fallen 2 or more times in the past year?: No  Any fall with injury in the past year?: No    Cognitive Screenin) Repeat 3 items (Leader, Season, Table)    2) Clock draw:   NORMAL  3) 3 item recall: Recalls 3 objects  Results: 3 items recalled: COGNITIVE IMPAIRMENT LESS LIKELY    Mini-CogTM Copyright WILEY Hedrick. Licensed by the author for use in [facility]; reprinted with permission (iain@[facility]). All rights reserved.      Do you have sleep apnea, excessive snoring or daytime drowsiness?: no    Reviewed and updated as needed this visit by clinical staff  Tobacco  Allergies  Meds  Problems         Reviewed and updated as needed this visit by Provider  Allergies  Meds  Problems        Social History     Tobacco Use     Smoking status: Former Smoker     Types: Cigarettes     Smokeless tobacco: Never Used   Substance Use Topics     Alcohol use: Yes     Comment: moderate                           Current providers sharing in care for this patient include:   Patient Care Team:  Sandy Hargrove MD as PCP - General (Family Practice)  Nuha Cai MD as Assigned PCP    The following health maintenance items are reviewed in Epic and correct as of today:  Health Maintenance   Topic Date Due     DEXA  1950     ZOSTER IMMUNIZATION (1 of 2) 10/13/1998     MEDICARE ANNUAL WELLNESS VISIT  2014     MAMMO SCREENING  2018     INFLUENZA VACCINE (1) 2019     LIPID  2020     ADVANCE CARE PLANNING  2020     BMP  2020     DTAP/TDAP/TD IMMUNIZATION (2 - Tdap) 2020     A1C  2020     MICROALBUMIN  2020     FALL RISK ASSESSMENT  2020     COLONOSCOPY  2021     HEPATITIS C SCREENING  Completed     PHQ-2  Completed     PNEUMOCOCCAL IMMUNIZATION 65+ LOW/MEDIUM RISK  Completed     IPV IMMUNIZATION  Aged Out     MENINGITIS IMMUNIZATION   Aged Out     BP Readings from Last 3 Encounters:   12/19/19 120/70   12/04/19 122/72   11/19/19 (!) 152/88    Wt Readings from Last 3 Encounters:   12/19/19 69.6 kg (153 lb 8 oz)   12/04/19 68.9 kg (152 lb)   11/19/19 69.2 kg (152 lb 8 oz)                  Patient Active Problem List   Diagnosis     Other specified genital prolapse(618.89)     Skin lesion of chest wall     Skin lesion of right arm     Advance Care Planning     Hypercholesterolemia     Hyperlipidemia LDL goal <130     Overweight (BMI 25.0-29.9)     Environmental allergies     Intraductal carcinoma, noninfiltrating     Impaired fasting blood sugar     Vitamin D deficiency     Hearing loss in right ear     Pelvic relaxation     Family history of colon cancer     Skin lesion of left arm     Fatigue, unspecified type     Need for hepatitis C screening test     Hx of colonic polyps     Hypertropia     Intermittent esotropia, alternating     Personal history of malignant neoplasm of breast     Superior oblique palsy     Benign essential hypertension     Past Surgical History:   Procedure Laterality Date     HYSTERECTOMY TOTAL ABDOMINAL, BILATERAL SALPINGO-OOPHORECTOMY, COMBINED  2/18/2004    lumpectomy left breast - [place]     LUMPECTOMY BREAST  1996     SURGICAL HISTORY OF -   9/03    hyperplastic colonic polyps - benign       Social History     Tobacco Use     Smoking status: Former Smoker     Packs/day: 0.25     Years: 5.00     Pack years: 1.25     Types: Cigarettes     Smokeless tobacco: Never Used   Substance Use Topics     Alcohol use: Yes     Alcohol/week: 2.0 standard drinks     Types: 2 Glasses of wine per week     Frequency: 2-4 times a month     Drinks per session: 1 or 2     Binge frequency: Never     Comment: moderate     Family History   Problem Relation Age of Onset     Hypertension Mother      Breast Cancer Mother         12 years ago     Depression Mother      Psychotic Disorder Mother      Diabetes Father      Hypertension Father       "Alcohol/Drug Father          Current Outpatient Medications   Medication Sig Dispense Refill     carvedilol (COREG) 25 MG tablet Take 1 tablet (25 mg) by mouth 2 times daily (with meals) 60 tablet 3     vitamin D2 (ERGOCALCIFEROL) 28569 units (1250 mcg) capsule Take 1 capsule (50,000 Units) by mouth once a week for 12 doses 12 capsule 0     Cyanocobalamin (B-12) 1000 MCG CAPS Take 1,000 mcg by mouth daily (Patient not taking: Reported on 11/16/2019) 100 capsule 3     Red Yeast Rice Extract (RED YEAST RICE PO) Take 2 capsule daily       Allergies   Allergen Reactions     No Known Drug Allergies      Recent Labs   Lab Test 11/19/19  1031 04/04/19  1319 12/20/18  1503 10/03/17  1240 06/05/13  0808   A1C 5.4  --   --  5.5  --    *  --   --  130* 139*   HDL 37*  --   --  38* 38*   TRIG 284*  --   --  347* 130   ALT  --  27  --  31  --    CR  --  0.68 0.71 0.64 0.55   GFRESTIMATED  --  88 86 >90 >90   GFRESTBLACK  --  >90 >90 >90 >90   POTASSIUM  --  4.0 3.6 4.2 3.6   TSH 1.20  --   --  1.10 1.37      ROS:  Constitutional, HEENT, cardiovascular, pulmonary, GI, , musculoskeletal, neuro, skin, endocrine and psych systems are negative, except as otherwise noted.    OBJECTIVE:   /70 (BP Location: Right arm, Patient Position: Sitting, Cuff Size: Adult Regular)   Pulse 57   Temp 97.8  F (36.6  C) (Oral)   Resp 16   Ht 1.568 m (5' 1.75\")   Wt 69.6 kg (153 lb 8 oz)   LMP  (LMP Unknown)   SpO2 94%   BMI 28.30 kg/m   Estimated body mass index is 28.3 kg/m  as calculated from the following:    Height as of this encounter: 1.568 m (5' 1.75\").    Weight as of this encounter: 69.6 kg (153 lb 8 oz).  EXAM:   GENERAL: healthy, alert and no distress  EYES: Eyes grossly normal to inspection, PERRL and conjunctivae and sclerae normal  HENT: ear canals and TM's normal, nose and mouth without ulcers or lesions  NECK: no adenopathy, no asymmetry, masses, or scars and thyroid normal to palpation  RESP: lungs clear to " auscultation - no rales, rhonchi or wheezes  BREAST: normal without masses, tenderness or nipple discharge, no palpable axillary masses or adenopathy and well healed biopsy incision left 12 o'clock  CV: regular rate and rhythm, normal S1 S2, no S3 or S4, no murmur, click or rub, no peripheral edema and peripheral pulses strong  ABDOMEN: soft, nontender, no hepatosplenomegaly, no masses and bowel sounds normal  MS: no gross musculoskeletal defects noted, no edema  SKIN: no suspicious lesions or rashes  NEURO: Normal strength and tone, mentation intact and speech normal  PSYCH: mentation appears normal, affect normal/bright    Diagnostic Test Results:  Labs reviewed in Epic  No results found for any visits on 12/19/19.    ASSESSMENT / PLAN:   1. Encounter for Medicare annual wellness exam  routine  - DEXA HIP/PELVIS/SPINE - Future; Future  - MA SCREENING DIGITAL BILAT - Future  (s+30); Future    2. Asymptomatic menopausal state   DEXA HIP/PELVIS/SPINE - Future; Future    3. Encounter for screening mammogram for malignant neoplasm of breast    MA SCREENING DIGITAL BILAT - Future  (s+30); Future    4. Flu vaccine need  HC FLU VACCINE, INCREASED ANTIGEN, PRESV FREE [03764]    5. Hypertension goal BP (blood pressure) < 140/90  Not at goal at home, increased medication  Continue to monitor blood pressure and return to clinic 1 month for fu  - carvedilol (COREG) 25 MG tablet; Take 1 tablet (25 mg) by mouth 2 times daily (with meals)  Dispense: 60 tablet; Refill: 3    6. Vitamin D deficiency  Recheck in 2 months after high dose weekly supplementation  - Vitamin D Deficiency; Future    7. Hypercholesterolemia  Has tried changing diet, recheck in 2 months  - Lipid panel reflex to direct LDL Fasting; Future    The 10-year ASCVD risk score (Sanjuanita ALPA JrJennifer, et al., 2013) is: 14%    Values used to calculate the score:      Age: 69 years      Sex: Female      Is Non- : No      Diabetic: No      Tobacco smoker:  "No      Systolic Blood Pressure: 120 mmHg      Is BP treated: Yes      HDL Cholesterol: 37 mg/dL      Total Cholesterol: 258 mg/dL     COUNSELING:  Reviewed preventive health counseling, as reflected in patient instructions    Estimated body mass index is 28.3 kg/m  as calculated from the following:    Height as of this encounter: 1.568 m (5' 1.75\").    Weight as of this encounter: 69.6 kg (153 lb 8 oz).         reports that she has quit smoking. Her smoking use included cigarettes. She has never used smokeless tobacco.    Appropriate preventive services were discussed with this patient, including applicable screening as appropriate for cardiovascular disease, diabetes, osteopenia/osteoporosis, and glaucoma.  As appropriate for age/gender, discussed screening for colorectal cancer, prostate cancer, breast cancer, and cervical cancer. Checklist reviewing preventive services available has been given to the patient.    Reviewed patients plan of care and provided an AVS. The Intermediate Care Plan ( asthma action plan, low back pain action plan, and migraine action plan) for Teetee meets the Care Plan requirement. This Care Plan has been established and reviewed with the Patient.    Counseling Resources:  ATP IV Guidelines  Pooled Cohorts Equation Calculator  Breast Cancer Risk Calculator  FRAX Risk Assessment  ICSI Preventive Guidelines  Dietary Guidelines for Americans, 2010  [employer]'s MyPlate  ASA Prophylaxis  Lung CA Screening    Sandy Hargrove MD  [facility]  "

## 2019-12-19 NOTE — PATIENT INSTRUCTIONS
You can contact our clinic anytime to schedule an appointment with Natan Krishna, our behavioralist here at [facility].    Patient Education    Vitamin d deficiency: once you've completed your weekly high dose Vitamin D, start a daily supplement of about 2000 units, either year around or October through March every winter.    Personalized Prevention Plan  You are due for the preventive services outlined below.  Your care team is available to assist you in scheduling these services.  If you have already completed any of these items, please share that information with your care team to update in your medical record.  Health Maintenance Due   Topic Date Due     Osteoporosis Screening  1950     Zoster (Shingles) Vaccine (1 of 2) 10/13/1998     Annual Wellness Visit  06/05/2014     Mammogram  05/17/2018     Flu Vaccine (1) 09/01/2019      1. You are due for a mammogram!   You can call any of the centers below to schedule your mammogram.  1.  [facility]  Appointment line [phone]  2   [facility]   Appointment line [phone]   3.   [facility]   Appointment line [phone]    2.  I strongly recommend getting the shingles vaccine (Shingrix) if you are over age 50, but please check with your insurance to see how much you might have to pay for this vaccine! If you are on Medicare, it's covered if you get it at a pharmacy but not in a clinic.    This shot will prevent shingles, a painful skin rash that you are at risk for getting if you have ever had chicken pox. Sometimes the pain will last the rest of your life, even after the rash has healed, and there is not much that we can do to relieve the pain.    Please consider getting this vaccine! You'll need #2 vaccines 2-4 months apart to be fully protected.    3. It is time for your dexa!  Please call our clinic to schedule this test. You do not need to fast prior to the test, although it is  recommended that you NOT take calcium on the day of the test.    If you have any questions, please contact the clinic or schedule an appointment with me, thank you!     [facility]  [address] [address]     Phone: [phone]  Fax: [phone]                Patient Education     Preventive Health Recommendations    See your health care provider every year to    Review health changes.     Discuss preventive care.      Review your medicines if your doctor has prescribed any.    You no longer need a yearly Pap test unless you've had an abnormal Pap test in the past 10 years. If you have vaginal symptoms, such as bleeding or discharge, be sure to talk with your provider about a Pap test.    Every 1 to 2 years, have a mammogram.  If you are over 69, talk with your health care provider about whether or not you want to continue having screening mammograms.    Every 10 years, have a colonoscopy. Or, have a yearly FIT test (stool test). These exams will check for colon cancer.     Have a cholesterol test every 5 years, or more often if your doctor advises it.     Have a diabetes test (fasting glucose) every three years. If you are at risk for diabetes, you should have this test more often.     At age 65, have a bone density scan (DEXA) to check for osteoporosis (brittle bone disease).    Shots:    Get a flu shot each year.    Get a tetanus shot every 10 years.    Talk to your doctor about your pneumonia vaccines. There are now two you should receive - Pneumovax (PPSV 23) and Prevnar (PCV 13).    Talk to your pharmacist about the shingles vaccine.    Talk to your doctor about the hepatitis B vaccine.    Nutrition:     Eat at least 5 servings of fruits and vegetables each day.    Eat whole-grain bread, whole-wheat pasta and brown rice instead of white grains and rice.    Get adequate Calcium and Vitamin D.     Lifestyle    Exercise at least 150 minutes a week (30 minutes a day, 5 days a week). This  will help you control your weight and prevent disease.    Limit alcohol to one drink per day.    No smoking.     Wear sunscreen to prevent skin cancer.     See your dentist twice a year for an exam and cleaning.    See your eye doctor every 1 to 2 years to screen for conditions such as glaucoma, macular degeneration and cataracts.    Personalized Prevention Plan  You are due for the preventive services outlined below.  Your care team is available to assist you in scheduling these services.  If you have already completed any of these items, please share that information with your care team to update in your medical record.  Health Maintenance Due   Topic Date Due     Osteoporosis Screening  1950     Zoster (Shingles) Vaccine (1 of 2) 10/13/1998     Annual Wellness Visit  06/05/2014     Mammogram  05/17/2018     Flu Vaccine (1) 09/01/2019       The 10-year ASCVD risk score (Sanjuanita YUEN Jr., et al., 2013) is: 14%    Values used to calculate the score:      Age: 69 years      Sex: Female      Is Non- : No      Diabetic: No      Tobacco smoker: No      Systolic Blood Pressure: 120 mmHg      Is BP treated: Yes      HDL Cholesterol: 37 mg/dL      Total Cholesterol: 258 mg/dL

## 2020-01-14 ENCOUNTER — OFFICE VISIT (OUTPATIENT)
Dept: SLEEP MEDICINE | Facility: CLINIC | Age: 70
End: 2020-01-14
Attending: FAMILY MEDICINE
Payer: COMMERCIAL

## 2020-01-14 VITALS
WEIGHT: 157 LBS | DIASTOLIC BLOOD PRESSURE: 71 MMHG | BODY MASS INDEX: 29.64 KG/M2 | RESPIRATION RATE: 18 BRPM | OXYGEN SATURATION: 96 % | SYSTOLIC BLOOD PRESSURE: 114 MMHG | HEART RATE: 65 BPM | HEIGHT: 61 IN

## 2020-01-14 DIAGNOSIS — R53.83 FATIGUE, UNSPECIFIED TYPE: ICD-10-CM

## 2020-01-14 DIAGNOSIS — R06.83 SNORING: ICD-10-CM

## 2020-01-14 DIAGNOSIS — G47.00 INSOMNIA, UNSPECIFIED TYPE: Primary | ICD-10-CM

## 2020-01-14 DIAGNOSIS — R06.09 DYSPNEA ON EXERTION: ICD-10-CM

## 2020-01-14 DIAGNOSIS — J31.0 RHINITIS, NON-ALLERGIC: ICD-10-CM

## 2020-01-14 PROCEDURE — 99205 OFFICE O/P NEW HI 60 MIN: CPT | Performed by: NURSE PRACTITIONER

## 2020-01-14 RX ORDER — ZOLPIDEM TARTRATE 5 MG/1
TABLET ORAL
Qty: 1 TABLET | Refills: 0 | Status: SHIPPED | OUTPATIENT
Start: 2020-01-14 | End: 2020-04-07

## 2020-01-14 ASSESSMENT — MIFFLIN-ST. JEOR: SCORE: 1164.53

## 2020-01-14 NOTE — PATIENT INSTRUCTIONS
"MY TREATMENT INFORMATION FOR SLEEP APNEA-  Teetee Dalal    DOCTOR : ULISES Amado CNP  SLEEP CENTER :      MY CONTACT NUMBER: [id number]  Use saline spray product (ocean complete or arm and hammer are easy to use) in shower where nose naturally opens up. Use another time of day with continue congestion over the sink.  If you need to cancel your sleep study, please call as soon as possible.  If you were prescribed a sleep aid, bring that to the sleep lab. Talk to tech about when to take it  Avoid napping the day of study    Please remember: do not drive if sleepy  Am I having a sleep study at a sleep center?  Make sure you have an appointment for the study before you leave!    Am I having a home sleep study?  Watch this video:  [email or web address]?[email or web address]  Please verify your insurance coverage with your insurance carrier    Frequently asked questions:  1. What is Obstructive Sleep Apnea (HIRAM)? HIRAM is the most common type of sleep apnea. Apnea means, \"without breath.\"  Apnea is most often caused by narrowing or collapse of the upper airway as muscles relax during sleep.   Almost everyone has occasional apneas. Most people with sleep apnea have had brief interruptions at night frequently for many years.  The severity of sleep apnea is related to how frequent and severe the events are.   2. What are the consequences of HIRAM? Symptoms include: feeling sleepy during the day, snoring loudly, gasping or stopping of breathing, trouble sleeping, and occasionally morning headaches or heartburn at night.  Sleepiness can be serious and even increase the risk of falling asleep while driving. Other health consequences may include development of high blood pressure and other cardiovascular disease in persons who are susceptible. Untreated HIRAM  can contribute to heart disease, stroke and diabetes.   3. What are the treatment options? In most situations, sleep " apnea is a lifelong disease that must be managed with daily therapy. Medications are not effective for sleep apnea and surgery is generally not considered until other therapies have been tried. Your treatment is your choice . Continuous Positive Airway (CPAP) works right away and is the therapy that is effective in nearly everyone. An oral device to hold your jaw forward is usually the next most reliable option. Other options include postioning devices (to keep you off your back), weight loss, and surgery including a tongue pacing device. There is more detail about some of these options below.    Important tips for using CPAP and similar devices   Know your equipment:  CPAP is continuous positive airway pressure that prevents obstructive sleep apnea by keeping the throat from collapsing while you are sleeping. In most cases, the device is  smart  and can slowly self-adjusts if your throat collapses and keeps a record every day of how well you are treated-this information is available to you and your care team.  BPAP is bilevel positive airway pressure that keeps your throat open and also assists each breath with a pressure boost to maintain adequate breathing.  Special kinds of BPAP are used in patients who have inadequate breathing from lung or heart disease. In most cases, the device is  smart  and can slowly self-adjusts to assist breathing. Like CPAP, the device keeps a record of how well you are treated.  Your mask is your connection to the device. You get to choose what feels most comfortable and the staff will help to make sure if fits. Here: are some examples of the different masks that are available:       Key points to remember on your journey with sleep apnea:  1. Sleep study.  PAP devices often need to be adjusted during a sleep study to show that they are effective and adjusted right.  2. Good tips to remember: Try wearing just the mask during a quiet time during the day so your body adapts to wearing  it. A humidifier is recommended for comfort in most cases to prevent drying of your nose and throat. Allergy medication from your provider may help you if you are having nasal congestion.  3. Getting settled-in. It takes more than one night for most of us to get used to wearing a mask. Try wearing just the mask during a quiet time during the day so your body adapts to wearing it. A humidifier is recommended for comfort in most cases. Our team will work with you carefully on the first day and will be in contact within 4 days and again at 2 and 4 weeks for advice and remote device adjustments. Your therapy is evaluated by the device each day.   4. Use it every night. The more you are able to sleep naturally for 7-8 hours, the more likely you will have good sleep and to prevent health risks or symptoms from sleep apnea. Even if you use it 4 hours it helps. Occasionally all of us are unable to use a medical therapy, in sleep apnea, it is not dangerous to miss one night.   5. Communicate. Call our skilled team on the number provided on the first day if your visit for problems that make it difficult to wear the device. Over 2 out of 3 patients can learn to wear the device long-term with help from our team. Remember to call our team or your sleep providers if you are unable to wear the device as we may have other solutions for those who cannot adapt to mask CPAP therapy. It is recommended that you sleep your sleep provider within the first 3 months and yearly after that if you are not having problems.   6. Use it for your health. We encourage use of CPAP masks during daytime quiet periods to allow your face and brain to adapt to the sensation of CPAP so that it will be a more natural sensation to awaken to at night or during naps. This can be very useful during the first few weeks or months of adapting to CPAP though it does not help medically to wear CPAP during wakefulness and  should not be used as a strategy just to  meet guidelines.  7. Take care of your equipment. Make sure you clean your mask and tubing using directions every day and that your filter and mask are replaced as recommended or if they are not working.     BESIDES CPAP, WHAT OTHER THERAPIES ARE THERE?  Positioning Device  Positioning devices are generally used when sleep apnea is mild and only occurs on your back.This example shows a pillow that straps around the waist. It may be appropriate for those whose sleep study shows milder sleep apnea that occurs primarily when lying flat on one's back. Preliminary studies have shown benefit but effectiveness at home may need to be verified by a home sleep test. These devices are generally not covered by medical insurance.    For less expensive options:           [email or web address] or MyStarAutograph for slumber bump pillow, or backpack w/ chest strap stuffed w/ pillow;  or t shirt w/2 pockets, sew in tennis balls  Positioning Device  Positioning devices are generally used when sleep apnea is mild and only occurs on your back.This example shows a pillow that straps around the waist. It may be appropriate for those whose sleep study shows milder sleep apnea that occurs primarily when lying flat on one's back. Preliminary studies have shown benefit but effectiveness at home may need to be verified by a home sleep test. These devices are generally not covered by medical insurance.  Examples of devices that maintain sleeping on the back to prevent snoring and mild sleep apnea.    Belt type body positioner  Http://Networked Insights.Welocalize/    Electronic reminder  Http://nightshifttherapy.com/  Http://www.Glomera.com.au/      Oral Appliance  What is oral appliance therapy?  An oral appliance device fits on your teeth at night like a retainer used after having braces. The device is made by a specialized dentist and requires several visits over 1-2 months before a manufactured device is made to fit your teeth and is adjusted to prevent your sleep apnea. Once an  oral device is working properly, snoring should be improved. A home sleep test may be recommended at that time if to determine whether the sleep apnea is adequately treated.       Some things to remember:  -Oral devices are often, but not always, covered by your medical insurance. Be sure to check with your insurance provider.   -If you are referred for oral therapy, you will be given a list of specialized dentists to consider or you may choose to visit the Web site of the American Academy of Dental Sleep Medicine  -Oral devices are less likely to work if you have severe sleep apnea or are extremely overweight.     More detailed information  An oral appliance is a small acrylic device that fits over the upper and lower teeth  (similar to a retainer or a mouth guard). This device slightly moves jaw forward, which moves the base of the tongue forward, opens the airway, improves breathing for effective treat snoring and obstructive sleep apnea in perhaps 7 out of 10 people .  The best working devices are custom-made by a dental device  after a mold is made of the teeth 1, 2, 3.  When is an oral appliance indicated?  Oral appliance therapy is recommended as a first-line treatment for patients with primary snoring, mild sleep apnea, and for patients with moderate sleep apnea who prefer appliance therapy to use of CPAP4, 5. Severity of sleep apnea is determined by sleep testing and is based on the number of respiratory events per hour of sleep.   How successful is oral appliance therapy?  The success rate of oral appliance therapy in patients with mild sleep apnea is 75-80% while in patients with moderate sleep apnea it is 50-70%. The chance of success in patients with severe sleep apnea is 40-50%. The research also shows that oral appliances have a beneficial effect on the cardiovascular health of HIRAM patients at the same magnitude as CPAP therapy7.  Oral appliances should be a second-line treatment in cases  of severe sleep apnea, but if not completely successful then a combination therapy utilizing CPAP plus oral appliance therapy may be effective. Oral appliances tend to be effective in a broad range of patients although studies show that the patients who have the highest success are females, younger patients, those with milder disease, and less severe obesity. 3, 6.   Finding a dentist that practices dental sleep medicine  Specific training is available through the American Academy of Dental Sleep Medicine for dentists interested in working in the field of sleep. To find a dentist who is educated in the field of sleep and the use of oral appliances, near you, visit the Web site of the American Academy of Dental Sleep Medicine.    References  1. Max, et al. Objectively measured vs self-reported compliance during oral appliance therapy for sleep-disordered breathing. Chest 2013; 144(5): 9712-5169.  2. Anushka et al. Objective measurement of compliance during oral appliance therapy for sleep-disordered breathing. Thorax 2013; 68(1): 91-96.  3. Maikel, et al. Mandibular advancement devices in 620 men and women with HIRAM and snoring: tolerability and predictors of treatment success. Chest 2004; 125: 7486-7704.  4. Leslie, et al. Oral appliances for snoring and HIRAM: a review. Sleep 2006; 29: 244-262.  5. Dilma et al. Oral appliance treatment for HIRAM: an update. J Clin Sleep Med 2014; 10(2): 215-227.  6. Makayla et al. Predictors of OSAH treatment outcome. J Dent Res 2007; 86: 5346-0938.      Weight Loss:    Weight loss is a long-term strategy that may improve sleep apnea in some patients.    Weight management is a personal decision and the decision should be based on your interest and the potential benefits.  If you are interested in exploring weight loss strategies, the following discussion covers the impact on weight loss on sleep apnea and the approaches that may be successful.    Being overweight  does not necessarily mean you will have health consequences.  Those who have BMI over 35 or over 27 with existing medical conditions carries greater risk.   Weight loss decreases severity of sleep apnea in most people with obesity. For those with mild obesity who have developed snoring with weight gain, even 15-30 pound weight loss can improve and occasionally eliminate sleep apnea.  Structured and life-long dietary and health habits are necessary to lose weight and keep healthier weight levels.     Though there may be significant health benefits from weight loss, long-term weight loss is very difficult to achieve- studies show success with dietary management in less than 10% of people. In addition, substantial weight loss may require years of dietary control and may be difficult if patients have severe obesity. In these cases, surgical management may be considered.  Finally, older individuals who have tolerated obesity without health complications may be less likely to benefit from weight loss strategies.      [unfilled]    Surgery:    Surgery for obstructive sleep apnea is considered generally only when other therapies fail to work. Surgery may be discussed with you if you are having a difficult time tolerating CPAP and or when there is an abnormal structure that requires surgical correction.  Nose and throat surgeries often enlarge the airway to prevent collapse.  Most of these surgeries create pain for 1-2 weeks and up to half of the most common surgeries are not effective throughout life.  You should carefully discuss the benefits and drawbacks to surgery with your sleep provider and surgeon to determine if it is the best solution for you.   More information  Surgery for HIRAM is directed at areas that are responsible for narrowing or complete obstruction of the airway during sleep.  There are a wide range of procedures available to enlarge and/or stabilize the airway to prevent blockage of breathing in the three  major areas where it can occur: the palate, tongue, and nasal regions.  Successful surgical treatment depends on the accurate identification of the factors responsible for obstructive sleep apnea in each person.  A personalized approach is required because there is no single treatment that works well for everyone.  Because of anatomic variation, consultation with an examination by a sleep surgeon is a critical first step in determining what surgical options are best for each patient.  In some cases, examination during sedation may be recommended in order to guide the selection of procedures.  Patients will be counseled about risks and benefits as well as the typical recovery course after surgery. Surgery is typically not a cure for a person s HIRAM.  However, surgery will often significantly improve one s HIRAM severity (termed  success rate ).  Even in the absence of a cure, surgery will decrease the cardiovascular risk associated with OSA7; improve overall quality of life8 (sleepiness, functionality, sleep quality, etc).      Palate Procedures:  Patients with HIRAM often have narrowing of their airway in the region of their tonsils and uvula.  The goals of palate procedures are to widen the airway in this region as well as to help the tissues resist collapse.  Modern palate procedure techniques focus on tissue conservation and soft tissue rearrangement, rather than tissue removal.  Often the uvula is preserved in this procedure. Residual sleep apnea is common in patient after pharyngoplasty with an average reduction in sleep apnea events of 33%2.      Tongue Procedures:  ExamWhile patients are awake, the muscles that surround the throat are active and keep this region open for breathing. These muscles relax during sleep, allowing the tongue and other structures to collapse and block breathing.  There are several different tongue procedures available.  Selection of a tongue base procedure depends on characteristics seen  on physical exam.  Generally, procedures are aimed at removing bulky tissues in this area or preventing the back of the tongue from falling back during sleep.  Success rates for tongue surgery range from 50-62%3.    Hypoglossal Nerve Stimulation:  Hypoglossal nerve stimulation has recently received approval from the [place] Food and Drug Administration for the treatment of obstructive sleep apnea.  This is based on research showing that the system was safe and effective in treating sleep apnea6.  Results showed that the median AHI score decreased 68%, from 29.3 to 9.0. This therapy uses an implant system that senses breathing patterns and delivers mild stimulation to airway muscles, which keeps the airway open during sleep.  The system consists of three fully implanted components: a small generator (similar in size to a pacemaker), a breathing sensor, and a stimulation lead.  Using a small handheld remote, a patient turns the therapy on before bed and off upon awakening.    Candidates for this device must be greater than 22 years of age, have moderate to severe HIRAM (AHI between 20-65), BMI less than 32, have tried CPAP/oral appliance without success, and have appropriate upper airway anatomy (determined by a sleep endoscopy performed by Dr. Lamb).    Hypoglossal Nerve Stimulation Pathway:    The sleep surgeon s office will work with the patient through the insurance prior-authorization process (including communications and appeals).    Nasal Procedures:  Nasal obstruction can interfere with nasal breathing during the day and night.  Studies have shown that relief of nasal obstruction can improve the ability of some patients to tolerate positive airway pressure therapy for obstructive sleep apnea1.  Treatment options include medications such as nasal saline, topical corticosteroid and antihistamine sprays, and oral medications such as antihistamines or decongestants. Non-surgical treatments can include external  nasal dilators for selected patients. If these are not successful by themselves, surgery can improve the nasal airway either alone or in combination with these other options.      Combination Procedures:  Combination of surgical procedures and other treatments may be recommended, particularly if patients have more than one area of narrowing or persistent positional disease.  The success rate of combination surgery ranges from 66-80%2,3.    References  1. Zhou REEVES. The Role of the Nose in Snoring and Obstructive Sleep Apnoea: An Update.  Eur Arch Otorhinolaryngol. 2011; 268: 1365-73.  2.  Akiko SM; Ruben JA; Sonia JR; Pallanch JF; Julius MB; Astrid SG; Mable POTTER. Surgical modifications of the upper airway for obstructive sleep apnea in adults: a systematic review and meta-analysis. SLEEP 2010;33(10):8644-8709. La COATES. Hypopharyngeal surgery in obstructive sleep apnea: an evidence-based medicine review.  Arch Otolaryngol Head Neck Surg. 2006 Feb;132(2):206-13.  3. Abraham YH1, Honey Y, Sam KAYLEY. The efficacy of anatomically based multilevel surgery for obstructive sleep apnea. Otolaryngol Head Neck Surg. 2003 Oct;129(4):327-35.  4. La COATES, Goldberg A. Hypopharyngeal Surgery in Obstructive Sleep Apnea: An Evidence-Based Medicine Review. Arch Otolaryngol Head Neck Surg. 2006 Feb;132(2):206-13.  5. Bryant PJ et al. Upper-Airway Stimulation for Obstructive Sleep Apnea.  N Engl J Med. 2014 Jan 9;370(2):139-49.  6. Yarely Y et al. Increased Incidence of Cardiovascular Disease in Middle-aged Men with Obstructive Sleep Apnea. Am J Respir Crit Care Med; 2002 166: 159-165  7. Quintana EM et al. Studying Life Effects and Effectiveness of Palatopharyngoplasty (SLEEP) study: Subjective Outcomes of Isolated Uvulopalatopharyngoplasty. Otolaryngol Head Neck Surg. 2011; 144: 623-631.              Your BMI is Body mass index is 29.66 kg/m .  Weight management is a personal decision.  If you are interested in exploring weight  loss strategies, the following discussion covers the approaches that may be successful. Body mass index (BMI) is one way to tell whether you are at a healthy weight, overweight, or obese. It measures your weight in relation to your height.  A BMI of 18.5 to 24.9 is in the healthy range. A person with a BMI of 25 to 29.9 is considered overweight, and someone with a BMI of 30 or greater is considered obese. More than two-thirds of American adults are considered overweight or obese.  Being overweight or obese increases the risk for further weight gain. Excess weight may lead to heart disease and diabetes.  Creating and following plans for healthy eating and physical activity may help you improve your health.  Weight control is part of healthy lifestyle and includes exercise, emotional health, and healthy eating habits. Careful eating habits lifelong are the mainstay of weight control. Though there are significant health benefits from weight loss, long-term weight loss with diet alone may be very difficult to achieve- studies show long-term success with dietary management in less than 10% of people. Attaining a healthy weight may be especially difficult to achieve in those with severe obesity. In some cases, medications, devices and surgical management might be considered.  What can you do?  If you are overweight or obese and are interested in methods for weight loss, you should discuss this with your provider.     Consider reducing daily calorie intake by 500 calories.     Keep a food journal.     Avoiding skipping meals, consider cutting portions instead.    Diet combined with exercise helps maintain muscle while optimizing fat loss. Strength training is particularly important for building and maintaining muscle mass. Exercise helps reduce stress, increase energy, and improves fitness. Increasing exercise without diet control, however, may not burn enough calories to loose weight.       Start walking three days a week  10-20 minutes at a time    Work towards walking thirty minutes five days a week     Eventually, increase the speed of your walking for 1-2 minutes at time    In addition, we recommend that you review healthy lifestyles and methods for weight loss available through the National Institutes of Health patient information sites:  http://win.niddk.nih.gov/publications/index.htm    And look into health and wellness programs that may be available through your health insurance provider, employer, local community center, or ladi club.

## 2020-01-14 NOTE — Clinical Note
Please see completed sleep consultation note.  She is exceptionally fatigued and states that occasionally has dyspnea on exertion when her fatigue is the worst.  Results from her polysomnogram should be available somewhere between February 5 February 8 my concern is there is something underlying that is more significant.  Please see my note, please contact me if you have questions via my chartWendy ULISES Rinaldi, CNP-BCSleep Medicine

## 2020-01-14 NOTE — PROGRESS NOTES
[facility]   Outpatient Sleep Medicine Consultation  1/14/2020        Name: Teetee Dalal MRN# [id number]   Age:  69 YOB: 1950      Date of Consultation: 11/19/2019  Consultation is requested by:  Sandy Hargrove MD         Reason for Sleep Consult:      Teetee reports a long history of fatigue that has worsened over the past 6 months decreasing her energy levels, fully awakening after nights rest but feeling sleepy after she is eaten breakfast.  As she has a family history of obstructive sleep apnea she is agreeable to this evaluation.          Assessment and Plan:    Teetee reports that this has shown a progressive worsening of a baseline fatigue over the last half year, history of difficulty with tiredness and fatigue.  This has greatly impacted her level of depression, and reports no difficulty with driving or working her 1 day a week, but has impacted on quality of life    SCALES       SLEEP APNEA: [place] score  5/8       INSOMNIA:  Insomnia severity score:  12/28       SLEEPINESS: [place] sleepiness scale: 4 /24            It's my impression that Teetee has a moderate pretest probability of obstructive sleep apnea with symptoms of loud snoring, fragmented sleep, witnessed apneas, in the setting of insomnia.  And the following diagnosis and plan has been developed...    Summary Sleep Diagnoses:      Snoring : In lab polysomnogram with virtual visit follow-up    Insomnia : This has seemed to develop recently but with sleep fragmentation has difficulty returning to sleep at least 3 out of 7 nights taking 30 minutes or longer, some nights missing up to 2 hours of sleep.  Will evaluate further after polysomnogram.    (Hypertension: Good today    Overweight: 29)    Fatigue: We discussed that depression (states last 20/30 days mental health is not considered good) may be driving her feelings of fatigue and perhaps treating a suspected sleep apnea will help with sleep continuity and results in  improvement in mental health.    Dyspnea with exertion: Reports that she could not do 2 flights of stairs without stopping at times when her fatigue is significant.  Will update Dr. Hargrove regarding this finding.  Perhaps she would wish to wait until her polysomnogram is completed for further evaluation, or may wish to move forward.  I suspect I will have the results of her PS G by February 5-February 10.    Rhinitis: Saline spray recommended      Summary Recommendations:      Split-night sleep study with use of sleep aid in the setting of a likely insomnia with current rate of sleep fragmentation.      Sleep diaries may be considered follow up    Treatment therapies and final decision with virtual visit after results of testing.     Dyspnea with exertion: Will update primary care dyspnea does vary with degree of fatigue, exemplified by in ability to complete 2 flights of stairs without stopping when exceptionally fatigued.  This is a different finding then normally seen with sleep disordered breathing or fatigue from insomnia or depression.  Results from polysomnogram expected somewhere between February 5-February 8.  I have a follow-up on February 11 with her via virtual visit.  Exam findings were normal with exception of a split S1    Rhinitis: Adding saline spray     Summary Counseling:  See instructions     Counseling included a comprehensive review of pathophysiology of possible obstructive sleep apnea.  Diagnostic and therapeutic strategies as well as risks of inadequate therapy or no treatment were discussed.    Cessation of sleep habits: :Avoidance caffeinated beverages  within 6 hours of bedtimeoSix hoursf bedtime      Teetee was provided with educational materials in instructions which were reviewed with this visit.                History of Present Illness:        Teetee reports worsening of fatigue over the past 6 months in particular with a progressive worsening of snoring, with increased volume, snort  or gasping arousals, and witnessed apneas.  Her bed partner present today does report that snoring will be present as it has been in the past but the volume is worse, interspersed with prolonged pauses.  Needed complaints of a headache occasionally in the morning 1-2 times per week.  Sleeps in all positions, likely worse when supine no symptoms reflective of narcolepsy, parasomnias or RLS.      SLEEP COMPLAINTS: DENIES THE FOLLOWING: (unless indicated in RED):  Cardio-respiratory    Coexisting Lung disease:             Coexisting Heart disease:        Morning headaches or confusion-rare morning headache  GERD or aspiration  Pain       SLEEP-WAKE SCHEDULE:  Works 1 day a week on Fridays  Enters bed on weekdays 9-10 PM lights out usually at 930, exits bed on weekdays 6 AM.  Scheduled the same on weekends  Sleep latency: 15-30 min(s)  and 2-3 awakenings for bathroom, noise, worry with 20- 120 mins to return to sleep.  Return to sleep is usually greater than 30 minutes for 3 or more nights per week over recent times.  Naps 0  Sleep schedule is  regular  Work schedule is 1 day/week on Friday, sleep is not worse the night before work  Estimated total sleep time 6-8 hours     Activities in bed: Reads from a phone without any change in night    Denies the following unless in RED:   Behaviors in bed:  clock watching yes, tries to avoid thinking about to do list, worry about health or ability to complete next day's tasks denies  If wake up is longer than an hour, and this usually can occur somewhere between 3-4 AM, will get out of bed and go to the couch may read on the phone and may return to sleep on the couch for the remaining nights rest.  Does not participates in while awake at night: Housework, employment, anxiety or rumination,, waking up with heart pounding or racing.                      Social, personal, family History and use of sleep aids or substances that affect sleep:     Teetee lives with her partner Mara who  is present at this visit.  Employed as a therapist working 5 hours/week. Sleep is not disrupted by environmental factors from noise,pets,, or children; but is disrupted by bedpartner who elbows her 1 snoring gets to rule out.  Has bed partner been sleeping separately because of snoring, no but Teetee will move if unable to fall back asleep after her wake ups.  Family History   Problem Relation Age of Onset     Hypertension Mother      Breast Cancer Mother         12 years ago     Depression Mother      Psychotic Disorder Mother      Diabetes Father      Hypertension Father      Alcohol/Drug Father         Sleep Family Hx:         Snoring does not know        RLS-yes   HIRAM -yes  Insomnia -no data  Parasomnia -no  Chemical History:      Tobacco: No     Uses less than 3 servings per day of c caffeine. Last caffeine intake is usually before noon, but on rare occasion could be with dinner    Supplements for wakefulness: No    EtOH: Rare intake, could be 1/2-1 beverage 1 of 14 nights somewhere between 5-7 PM  Recreational Drugs: No  Sleep aids: no         Review of Systems:     A complete 10 point review of systems was negative other than HPI or as commented below:   Teetee Dalal has gained 0 pounds in the last year.      CONSTITUTIONAL: NEGATIVE for weight gain/loss, fever, chills, sweats or night sweats, drug allergies.  EYES: NEGATIVE for changes in vision, blind spots, double vision.  ENT: NEGATIVE for ear pain, sore throat, sinus pain, runny nose, bloody nose POSITIVE postnasal drip after viral  CARDIAC: NEGATIVE for fast heartbeats or fluttering in chest, chest pain or pressure, breathlessness when lying flat, swollen legs or swollen feet.  POSITIVE hypertension  NEUROLOGIC: NEGATIVE  weakness or numbness in the arms or legs.  POSITIVE: Headaches  DERMATOLOGIC: NEGATIVE for rashes, new moles or change in mole(s)  PULMONARY: NEGATIVE SOB at rest,, dry cough, productive cough, coughing up blood, wheezing or whistling  when breathing.  POSITIVE: Shortness of breath with activity due to fatigue, some days admits she could do 2 flights of stairs without stopping, other days not.  GASTROINTESTINAL: NEGATIVE for nausea or vomitting, loose or watery stools, fat or grease in stools, constipation, abdominal pain, bowel movements black in color or blood noted.  GENITOURINARY: NEGATIVE for pain during urination, blood in urine, urinating more frequently than usual, irregular menstrual periods.  MUSCULOSKELETAL: NEGATIVE for muscle pain, bone or joint pain, swollen joints.  ENDOCRINE: NEGATIVE for increased thirst or urination, diabetes.  LYMPHATIC: NEGATIVE for swollen lymph nodes, lumps or bumps in the breasts or nipple discharge.    MENTAL HEALTH: POSITIVE: Depression       Physical Examination:     [place] Total Score 1/14/2020   Total score - [place] 4   malliv, micrognathia, split s1, nl  Neck Circumference: 13 inches/cm   Constitutional: . Awake, alert, cooperative, dressed casually, good eye contact, comfortably sitting in a chair, in no apparent distress  Mood: euthymic; affect congruent with full range and intensity.  Attention/Concentration:  Normal   Eyes: No icterus.  ENT: Mallampati Class: IV.   Tonsillar Stage: 1  hidden by pillars  micrognathia, small and crowded oropharynx,  low-lying soft palate, nasal congestion left greater than right,Dentition in good condition.  Good advancement of lower jaw without tmd  Cardiovascular: Regular S2, RRR, no gallops or murmurs, POSITIVE split S1  Neck: Supple, no thyroid enlargement.   Pulmonary:  Chest symmetric, lungs clear bilaterally and no crackles, wheezes or rales  Extremities:  No pedal pitting edema.  Musc le/joint: No strength and tone normal   Skin: No no rash or significant lesions.   Gait Normal.  Neurologic: Alert, oriented x3, no focal neurological deficit, cranial nerves grossly normal            Data: All pertinent previous laboratory data reviewed     No results found  for: PH, PHARTERIAL, PO2, [id number], SAT, PCO2, HCO3, BASEEXCESS, ZEHRA, BEB  Lab Results   Component Value Date    TSH 1.20 11/19/2019    TSH 1.10 10/03/2017     Lab Results   Component Value Date     (H) 04/04/2019    GLC 79 12/20/2018     Lab Results   Component Value Date    HGB 14.4 11/19/2019    HGB 14.2 04/04/2019     Lab Results   Component Value Date    BUN 8 04/04/2019    BUN 17 12/20/2018    CR 0.68 04/04/2019    CR 0.71 12/20/2018     Lab Results   Component Value Date    AST 28 04/04/2019    AST 24 10/03/2017    ALT 27 04/04/2019    ALT 31 10/03/2017    ALKPHOS 103 04/04/2019    ALKPHOS 96 10/03/2017    BILITOTAL 1.3 04/04/2019    BILITOTAL 0.9 10/03/2017     No results found for: UAMP, UBARB, BENZODIAZEUR, UCANN, UCOC, OPIT, UPCP    Echocardiology: No    Chest x-ray: No    PFT: No    Copy to: Sandy Hargrove    The above note was dictated using voice recognition software. Although reviewed after completion, some word and grammatical error may remain . Please contact the author for any clarifications.    ULISES Amado CNP 1/14/2020     Total time spent with patient: 60 min >50% counseling

## 2020-01-23 ENCOUNTER — TELEPHONE (OUTPATIENT)
Dept: SLEEP MEDICINE | Facility: CLINIC | Age: 70
End: 2020-01-23

## 2020-01-23 NOTE — TELEPHONE ENCOUNTER
Call out to [facility] (?) to discuss possibility of inlab split night study.  Reason for inlab is her significant development of insomnia over the past 6 months, along with worsening of her SDB symptoms.  Dr. Fariba Strauss did pete approval [id number].    I will update both Brittney and Davina Calderon, and Nalini Leblanc and Mariusz Zamorano (for coverage)-that we have approval.    ULISES Marc, CNP-BC  Sleep Medicine

## 2020-01-28 ENCOUNTER — THERAPY VISIT (OUTPATIENT)
Dept: SLEEP MEDICINE | Facility: CLINIC | Age: 70
End: 2020-01-28
Payer: COMMERCIAL

## 2020-01-28 DIAGNOSIS — G47.00 INSOMNIA, UNSPECIFIED TYPE: ICD-10-CM

## 2020-01-28 DIAGNOSIS — R53.83 FATIGUE, UNSPECIFIED TYPE: ICD-10-CM

## 2020-01-28 DIAGNOSIS — J31.0 RHINITIS, NON-ALLERGIC: ICD-10-CM

## 2020-01-28 DIAGNOSIS — R06.83 SNORING: ICD-10-CM

## 2020-01-28 DIAGNOSIS — G47.33 OSA (OBSTRUCTIVE SLEEP APNEA): ICD-10-CM

## 2020-01-28 PROCEDURE — 95811 POLYSOM 6/>YRS CPAP 4/> PARM: CPT | Performed by: INTERNAL MEDICINE

## 2020-01-28 NOTE — Clinical Note
None very brief episode of nonsustained wide-complex tachycardia on the PSG.  See screenshot in the procedure note.  I sent a message to the patient in my chart.  Do not know if she has any symptoms.TC

## 2020-01-29 PROBLEM — G47.33 OSA (OBSTRUCTIVE SLEEP APNEA): Status: ACTIVE | Noted: 2020-01-29

## 2020-01-29 NOTE — PATIENT INSTRUCTIONS
[facility]    1. Your sleep study will be reviewed by a sleep physician within the next few days.     2. Please follow up in the sleep clinic as scheduled, or, make an appointment with your sleep provider to be seen within two weeks to discuss the results of the sleep study.    3. If you have any questions or problems with your treatment plan, please contact your sleep clinic provider at [phone] to further manage your condition.    4. Please review your attached medication list, and, at your follow-up appointment advise your sleep clinic provider about any changes.    5. Go to [email or web address] for more information about your sleep problems.    Main Powell, RPSGT  January 29, 2020

## 2020-01-29 NOTE — PROCEDURES
" SLEEP STUDY INTERPRETATION  SPLIT NIGHT STUDY      Patient: CARMENCITA MCCRARY  YOB: 1950  Study Date: 1/28/2020  MRN: [id number]  Referring Provider: Sandy Hargrove MD  Ordering Provider: RACHEL Moore CNP    Indications for Polysomnography: The patient is a 69 y old Female who is 5' 1\" and weighs 157.0 lbs. Her BMI is 30.0, [place] sleepiness scale 4.0 and neck circumference is 13.0 cm. Relevant medical history includes breast cancer, hypertension. A diagnostic polysomnogram was performed to evaluate for sleep apnea. After 256.5 minutes of sleep time the patient exhibited sufficient respiratory events qualifying her for a CPAP trial which was then initiated.    Polysomnogram Data: A full night polysomnogram recorded the standard physiologic parameters including EEG, EOG, EMG, ECG, nasal and oral airflow. Respiratory parameters of chest and abdominal movements were recorded with respiratory inductance plethysmography. Oxygen saturation was recorded by pulse oximetry.  Hypopnea scoring rule used: 1B 4%    Diagnostic PSG  Sleep Architecture: Decreased sleep efficiency with increased arousal index, increased wake after sleep onset, and minimal REM sleep.  The total recording time of the polysomnogram was 323.9 minutes. The total sleep time was 256.5 minutes. Sleep latency was normal at 8.9 minutes with the use of a sleep aid (zolpidem 5 mg). REM latency was 221.5 minutes. Arousal index was increased at 24.6 arousals per hour. Sleep efficiency was decreased at 79.2%. Wake after sleep onset was 53.5 minutes. The patient spent 11.3% of total sleep time in Stage N1, 57.9% in Stage N2, 30.4% in Stage N3, and 0.4% in REM. Time in REM supine was 1.0 minutes.    Respiration: Severe obstructive sleep apnea with associated hypoxemia.    Events ? The polysomnogram revealed a presence of 55 obstructive, - central, and - mixed apneas resulting in an apnea index of 12.9 events per hour. There were 69 obstructive " hypopneas and - central hypopneas resulting in an obstructive hypopnea index of 16.1 and central hypopnea index of - events per hour. The combined apnea/hypopnea index was 30.4 events per hour (central apnea/hypopnea index was - events per hour).  The REM AHI was 120.0 events per hour. The supine AHI was 32.3 events per hour. The RERA index was 1.9 events per hour. The RDI was 32.3 events per hour.    Snoring - was reported as loud.    Respiratory rate and pattern - was notable for normal respiratory rate and pattern.    Sustained Sleep Associated Hypoventilation - Transcutaneous carbon dioxide monitoring was not used, however significant hypoventilation was not suggested by oximetry.    Sleep Associated Hypoxemia - (Greater than 5 minutes O2 sat at or below 88%) was not present. Baseline oxygen saturation was 91.9%. Lowest oxygen saturation was 72.4%. Time spent less than or equal to 88% was 25.9 minutes. Time spent less than or equal to 89% was 44.6 minutes.     Treatment PSG  Sleep Architecture: On PAP sleep appeared more consolidated, and REM sleep achieved.  At 02:49:33 AM the patient was placed on PAP treatment and was titrated at pressures ranging from CPAP 5 cmH2O up to CPAP 6 cmH2O. The total recording time of the treatment portion of the study was 164.5 minutes. The total sleep time was 109.0 minutes. During the treatment portion of the study the sleep latency was 6.0 minutes. REM latency was 17.0 minutes. Arousal index was normal at 9.9 arousals per hour. Sleep efficiency was decreased at 66.3%. Wake after sleep onset was 39.5 minutes. The patient spent 9.6% of total sleep time in Stage N1, 60.6% in Stage N2, 14.7% in Stage N3, and 15.1% in REM. Time in REM supine was 16.5 minutes.       Respiration: CPAP setting of 5 to 6 cmH20 were effective at treating obstructive events including during REM supine however residual snoring was audible.    The final pressure was CPAP 6 cmH2O with an AHI of - events per  hour. Time in REM supine on final pressure was - minutes. This titration was considered Optimal (residual AHI < 5 events per hour and including REM?supine sleep at final pressure).     Movement Activity: No abnormal movement activity.    Periodic Limb Movements  o During the diagnostic portion of the study, there were - PLMs recorded. The PLM index was - movements per hour. The PLM Arousal Index was - per hour.  o During the treatment portion of the study, there were - PLMs recorded. The PLM index was - movements per hour. The PLM Arousal Index was - per hour.    REM EMG Activity - Excessive transient/sustained muscle activity was not present.    Nocturnal Behavior - Abnormal sleep related behaviors were not noted.    Bruxism - None apparent.    Cardiac Summary: One episode non-sustained wide complex tachycardia.  During the diagnostic portion of the study, the average pulse rate was 55.1 bpm. The minimum pulse rate was 42.8 bpm while the maximum pulse rate was 77.0 bpm.    During the treatment portion of the study, the average pulse rate was 58.0 bpm. The minimum pulse rate was 49.0 bpm while the maximum pulse rate was 79.1 bpm.       30 sec on diagnostic 1:10 AM      Assessment:     Severe obstructive sleep apnea with AHI 30.4 events per hour and associated hypoxemia.    Decreased sleep efficiency with increased arousal index, increased wake after sleep onset, and minimal REM sleep.    CPAP setting of 5 to 6 cmH20 were effective at treating obstructive events including during REM supine however residual snoring was audible.    On PAP sleep appeared more consolidated, and REM sleep achieved.    One episode non-sustained wide complex tachycardia.    No abnormal movement activity.    Recommendations:    Treatment of HIRAM with Auto?titrating PAP therapy with a range of 5 cmH2O to 8 cmH2O. Recommend clinical follow up with sleep management team, including review of compliance measures.    Clinical correlation  recommended for possible non-sustained wide complex tachycardia. Further evaluation may be indicated.    Advice regarding the risks of drowsy driving.    Suggest optimizing sleep schedule and avoiding sleep deprivation.    Weight management (BMI > 30).    Pharmacologic therapy should be used for management of restless legs syndrome only if present and clinically indicated and not based on the presence of periodic limb movements alone.    Diagnostic Codes:   Obstructive Sleep Apnea G47.33  Sleep Hypoxemia/Hypoventilation G47.36             _____________________________________   Electronically Signed By: Loretta Khoury MD 1/29/2020

## 2020-01-30 LAB — SLPCOMP: NORMAL

## 2020-02-11 ENCOUNTER — VIRTUAL VISIT (OUTPATIENT)
Dept: SLEEP MEDICINE | Facility: CLINIC | Age: 70
End: 2020-02-11
Payer: COMMERCIAL

## 2020-02-11 DIAGNOSIS — G47.33 OSA (OBSTRUCTIVE SLEEP APNEA): Primary | ICD-10-CM

## 2020-02-11 PROCEDURE — 98967 PH1 ASSMT&MGMT NQHP 11-20: CPT | Performed by: NURSE PRACTITIONER

## 2020-02-11 NOTE — PATIENT INSTRUCTIONS
You should hear from someone by Wed or Thurs at the latest.  When you get your equipment, it may help to practice with your cpap when you first get home from work for 10-15 mins or at a set time you designate-read a book or listen to music.  Then move to the bedside for the night's rest.place mask directly on the pillow.    Our sleep therapy management group will follow you closely-please call them back when they contact you with questions you have on adapting to cpap.  They are here to help.    Please don't drive if sleepy, it may take awhile till your sleepiness resolves.    I'll see you back in clinic about 7 wks after your set up.

## 2020-02-11 NOTE — PROGRESS NOTES
HPI: Referred by Sandy Hargrove MD  Baseline symptoms Teetee reports a long history of fatigue that has worsened over the past 6 months decreasing her energy levels, fully awakening after nights rest but feeling sleepy after she is eaten breakfast.  As she has a family history of obstructive sleep apnea she is agreeable to this evaluation.     Sleep comorbities: sleep maintenance insomnia  Cormorbid conditions: rhinitis, dyspnea, overweight      1/28/2020 PSG RevealedSevere obstructive sleep apnea with AHI 30.4 events per hour and associated hypoxemia.    Sleep Associated Hypoxemia - (Greater than 5 minutes O2 sat at or below 88%) was not present. Baseline oxygen saturation was 91.9%. Lowest oxygen saturation was 72.4%. Time spent less than or equal to 88% was 25.9 minutes. Time spent less than or equal to 89% was 44.6 minutes.     Decreased sleep efficiency with increased arousal index, increased wake after sleep onset, and minimal REM sleep.    CPAP setting of 5 to 6 cmH20 were effective at treating obstructive events including during REM supine however residual snoring was audible.    On PAP sleep appeared more consolidated, and REM sleep achieved.    One episode non-sustained wide complex tachycardia.    No abnormal movement activity.      Treatment options discussed dental, CPAP, surgical    Barriers to treatment none: None    Preferences CPAP    ASSESSMENT/PLAN:    1. HIRAM reviewed results of polysomnogram, and assessed patient's response to treatment with CPAP.  Reports that she was not aware of wake ups after CPAP was applied.  She is motivated to consider this treatment option.  She is also motivated to consider CPAP as it may be the quickest way to relieve this fatigue that she has been struggling with for a considerable length of time.  She has chosen [facility] medical equipment to provide her with DME services, understands the need to meet compliance as well as be seen back in clinic by me  at about week 7.  I will place orders for CPAP with a range of 5-10 cm of water, and provide some tips on adaptation to CPAP.  She will follow-up with me as indicated.  2.  Hypoxemia appears to be resolved with use of CPAP.  3. Svt.  Patient unaware of having problems with palpitations or fast heartbeat will update Dr. Hargrove    CC: Sandy Hargrove    The above note was dictated using voice recognition software. Although reviewed after completion, some word and grammatical error may remain . Please contact the author for any clarifications.    Time spent with patient is 15 minutes, of which >50% was spent in counseling, education and coordination of care.  Time was also spent assessing response to CPAP therapy in the testing environment.      .

## 2020-02-13 ENCOUNTER — DOCUMENTATION ONLY (OUTPATIENT)
Dept: SLEEP MEDICINE | Facility: CLINIC | Age: 70
End: 2020-02-13
Payer: COMMERCIAL

## 2020-02-13 NOTE — PROGRESS NOTES
Patient was offered choice of vendor and chose [facility].  Patient Teetee Dalal was set up at [place] on February 13, 2020. Patient received a Resmed AirSense 10 Auto. Pressures were set at 5-10 cm H2O.   Patient s ramp is 4 cm H2O for Auto and FLEX/EPR is 2.  Patient received a Resmed Mask name: AIRFIT N20  Nasal mask size Small, heated tubing and heated humidifier.  Patient does need to meet compliance. Patient has a follow up on 4/7/2020 with Susan Holguin CNP.    Odette Marin

## 2020-02-17 ENCOUNTER — DOCUMENTATION ONLY (OUTPATIENT)
Dept: SLEEP MEDICINE | Facility: CLINIC | Age: 70
End: 2020-02-17

## 2020-02-17 NOTE — PROGRESS NOTES
Patient returned call.    Subjective measures:   Pt states that things are going suprisingly well so far. She thinks she is feeling a little better.     Assessment: Pt meeting objective benchmarks.  Patient meeting subjective benchmarks.     Action plan:pt to have 14 day [facility] visit.      Total time spent counseling, coaching  and reviewing PAP therapy data with patient  6 minutes     [id number] no (this call)    [id number] no (previous call)

## 2020-02-17 NOTE — PROGRESS NOTES
3 DAY STM VISIT    Diagnostic AHI: 30.4  PSG    Patient contacted for 3 day STM visit    Confirmed with patient at time of call- N/A Patient is still interested in STM service     Message left for patient to return call    Replacement device: No  STM ordered by provider: Yes     Device type: Auto-CPAP  PAP settings from order::  CPAP min 5 cm  H20       CPAP max 10 cm  H20  Mask type:    Nasal Mask     Device settings from machine      Min CPAP 5            Max CPAP 10      Assessment: Nightly usage over four hours.  Action plan: Patient to have 14 day STM visit. Patient has a follow up visit scheduled:   yes within 31-90 days of set up.     Total time spent on accessing and  interpreting remote patient PAP therapy data  10 minutes  Total time spent counseling, coaching  and reviewing PAP therapy data with patient  1 minutes  [id number] no

## 2020-02-28 ENCOUNTER — DOCUMENTATION ONLY (OUTPATIENT)
Dept: SLEEP MEDICINE | Facility: CLINIC | Age: 70
End: 2020-02-28

## 2020-02-28 NOTE — PROGRESS NOTES
14  DAY STM VISIT    Diagnostic AHI: 30.4  PSG    Message left for patient to return call     Assessment: Pt meeting objective benchmarks.       Action plan: waiting for patient to return call.  and pt to have 30 day STM visit.      Device type: Auto-CPAP    PAP settings: CPAP min 5.0 cm  H20       CPAP max 10.0 cm  H20      95th% pressure 9.8 cm  H20     Mask type:  Nasal Mask    Objective measures: 14 day rolling measures      Compliance  92 %      Leak  9.84 lpm  last  upload      AHI 0.65   last  upload      Average number of minutes 449      Objective measure goal  Compliance   Goal >70%  Leak   Goal < 24 lpm  AHI  Goal < 5  Usage  Goal >240        Total time spent on accessing and interpreting remote patient PAP therapy data  10 minutes    Total time spent counseling, coaching  and reviewing PAP therapy data with patient  1 minutes    [id number]  no  [id number]  no (3 day STM)

## 2020-03-19 ENCOUNTER — VIRTUAL VISIT (OUTPATIENT)
Dept: FAMILY MEDICINE | Facility: CLINIC | Age: 70
End: 2020-03-19
Payer: COMMERCIAL

## 2020-03-19 DIAGNOSIS — E78.5 HYPERLIPIDEMIA LDL GOAL <130: ICD-10-CM

## 2020-03-19 DIAGNOSIS — E55.9 VITAMIN D DEFICIENCY: ICD-10-CM

## 2020-03-19 DIAGNOSIS — E78.00 HYPERCHOLESTEROLEMIA: ICD-10-CM

## 2020-03-19 DIAGNOSIS — I10 HYPERTENSION GOAL BP (BLOOD PRESSURE) < 140/90: Primary | ICD-10-CM

## 2020-03-19 PROCEDURE — 99443 ZZC PHYSICIAN TELEPHONE EVALUATION 21-30 MIN: CPT | Performed by: FAMILY MEDICINE

## 2020-03-19 RX ORDER — LISINOPRIL 20 MG/1
20 TABLET ORAL AT BEDTIME
Qty: 90 TABLET | Refills: 3 | Status: SHIPPED | OUTPATIENT
Start: 2020-03-19 | End: 2020-04-30 | Stop reason: SINTOL

## 2020-03-19 NOTE — PROGRESS NOTES
"Teetee Dalal is a 69 year old female who is being evaluated via a billable telephone visit.      The patient has been notified of following:     \"This telephone visit will be conducted via a call between you and your physician/provider. We have found that certain health care needs can be provided without the need for a physical exam.  This service lets us provide the care you need with a short phone conversation.  If a prescription is necessary we can send it directly to your pharmacy.  If lab work is needed we can place an order for that and you can then stop by our lab to have the test done at a later time.    If during the course of the call the physician/provider feels a telephone visit is not appropriate, you will not be charged for this service.\"     Teetee Dalal complains of    HPI  fatigue  Chief Complaint   Patient presents with     Sleep Study   she had quesitons about one episode of non-sustained wide complex tachycardia, without any symptoms of palpitations or chest pressure.  She is using cpap and is tolerating well, feels more energetic and less tired during the day. She still feels that she's a little more tired that she should be, but not sure if this is just age related.  She exercises daily for 30 minutes, and some dance classes.      Hypertension Follow-up      Do you check your blood pressure regularly outside of the clinic? Yes     Are you following a low salt diet? Yes    Are your blood pressures ever more than 140 on the top number (systolic) OR more   than 90 on the bottom number (diastolic), for example 140/90? No   She stopped taking carvedilol at the end of December 2019, she was experiencing sob, exercise intolerance.  While she was on medication she had  BP's 140s - 150's/80's, when she stopped in January she had BP's 150's - 160's /80's  Ranges from 184/104, 146/88    GFR Estimate   Date Value Ref Range Status   04/04/2019 88 >60 mL/min/[1.73_m2] Final     Comment:     Non  " American GFR Calc  Starting 12/18/2018, serum creatinine based estimated GFR (eGFR) will be   calculated using the Chronic Kidney Disease Epidemiology Collaboration   (CKD-EPI) equation.     12/20/2018 86 >60 mL/min/[1.73_m2] Final     Comment:     Non  GFR Calc  Starting 12/18/2018, serum creatinine based estimated GFR (eGFR) will be   calculated using the Chronic Kidney Disease Epidemiology Collaboration   (CKD-EPI) equation.     10/03/2017 >90 >60 mL/min/1.7m2 Final     Comment:     Non  GFR Calc        BP Readings from Last 3 Encounters:   01/14/20 114/71   12/19/19 120/70   12/04/19 122/72      I have reviewed and updated the patient's Past Medical History, Social History, Family History and Medication List.    ALLERGIES  No known drug allergies    Additional provider notes:     Assessment/Plan:  1. Hypertension goal BP (blood pressure) < 140/90  Not at goal, side effects from betablocker, start ACE as below, monitor blood pressure at home and let me know.  Labs due, return to clinic 2-4 weeks for fasting labs, see orders  - lisinopril (ZESTRIL) 20 MG tablet; Take 1 tablet (20 mg) by mouth At Bedtime  Dispense: 90 tablet; Refill: 3  - **Basic metabolic panel FUTURE anytime; Future  - Albumin Random Urine Quantitative with Creat Ratio; Future    2. Hyperlipidemia LDL goal <130  LDL Cholesterol Calculated   Date Value Ref Range Status   11/19/2019 164 (H) <100 mg/dL Final     Comment:     Above desirable:  100-129 mg/dl  Borderline High:  130-159 mg/dL  High:             160-189 mg/dL  Very high:       >189 mg/dl      not at goal, recheck and consider statin  - Lipid panel reflex to direct LDL Fasting; Future    3. Vitamin D deficiency  On supplement, recheck  - **Vitamin D Deficiency FUTURE anytime; Future    4. Hypercholesterolemia  See above      Phone call duration:  25 minutes    Sandy Hargrove MD

## 2020-03-27 ENCOUNTER — DOCUMENTATION ONLY (OUTPATIENT)
Dept: SLEEP MEDICINE | Facility: CLINIC | Age: 70
End: 2020-03-27

## 2020-04-07 VITALS — WEIGHT: 157 LBS | BODY MASS INDEX: 29.64 KG/M2 | HEIGHT: 61 IN

## 2020-04-07 ASSESSMENT — MIFFLIN-ST. JEOR: SCORE: 1164.27

## 2020-04-07 NOTE — PROGRESS NOTES
"Cpap usage is viewable via synopsis      Have you been in contact with anyone with COVID-19 in the last 30 days No    Do you have any of the following symptoms:   Cough No  Fever No  Rash No  Shortness of breath No    Have you Traveled in the last 30 day? No  Have you been in contact with anyone who traveled in the last 30 day?   No     Teetee Dalal is a 69 year old female who is being evaluated via a billable telephone visit.      The patient has been notified of following:     \"This telephone visit will be conducted via a call between you and your physician/provider. We have found that certain health care needs can be provided without the need for a physical exam.  This service lets us provide the care you need with a short phone conversation.  If a prescription is necessary we can send it directly to your pharmacy.  If lab work is needed we can place an order for that and you can then stop by our lab to have the test done at a later time.    Telephone visits are billed at different rates depending on your insurance coverage. During this emergency period, for some insurers they may be billed the same as an in-person visit.  Please reach out to your insurance provider with any questions.    If during the course of the call the physician/provider feels a telephone visit is not appropriate, you will not be charged for this service.\"    Patient has given verbal consent for Telephone visit?  Yes    Teetee Dalal complains of  No chief complaint on file.      I have reviewed and updated the patient's Past Medical History, Social History, Family History and Medication List.  .  CC: Evaluate compliance and response to CPAP therapy for severe obstructive sleep apnea    :HPI: Referred by Sandy Hargrove MD  Baseline symptoms Teetee reports a long history of fatigue that has worsened over the past 6 months decreasing her energy levels, fully awakening after nights rest but feeling sleepy after she is eaten breakfast.  As she " has a family history of obstructive sleep apnea she is agreeable to this evaluation.     Sleep comorbities: sleep maintenance insomnia  Cormorbid conditions: rhinitis, dyspnea, overweight      1/28/2020 PSG RevealedSevere obstructive sleep apnea with AHI 30.4 events per hour and associated hypoxemia.    Sleep Associated Hypoxemia - (Greater than 5 minutes O2 sat at or below 88%) was not present. Baseline oxygen saturation was 91.9%. Lowest oxygen saturation was 72.4%. Time spent less than or equal to 88% was 25.9 minutes. Time spent less than or equal to 89% was 44.6 minutes.     Decreased sleep efficiency with increased arousal index, increased wake after sleep onset, and minimal REM sleep.    CPAP setting of 5 to 6 cmH20 were effective at treating obstructive events including during REM supine however residual snoring was audible.    On PAP sleep appeared more consolidated, and REM sleep achieved.    One episode non-sustained wide complex tachycardia.    No abnormal movement activity.    Set up on CPAP on 2/13/2020 at [facility] medical equipment    ALLERGIES  No known drug allergies    Additional provider notes: insert own note template here   Overall, she rates the experience with PAP as 7 (0 poor, 10 great). The mask is not comfortable. no  The mask is uncomfortable because of headgear, no mask exchange activity, not like it on my face.   The mask is leaking at her .  The mask is leaking some nights per week. No wakeups from  She is not snoring with the mask on. She is not having gasp arousals.  She is not having significant oral/nasal dryness. AM nasal congestion- The pressure is not comfortable. The pressure is uncomfortable because of awareness prior to fall asleep.    Her PAP interface is Nasal Pillows.    Bedtime is typically 9:30-10pm. Usually it [id number] mins to fall asleep with reading in bed with the mask on. If it takes longer, will take mask off to read, then replace when feeling tired (1/7  nights/week. Wakeup in middle of night (2-3AM ,for 2 hrs or more 2-3 times/wk, somewhat less with cpap, will get up to couch to read if wakeup is 1/2 to 1 hr or more.  Awakening later in night (4A or after) maybe up for 1 hr, sometimes for rest of the night.  Usual rise time is 6-7 AM, occasionally 4A. Patient is using PAP therapy 6 1/2 to 7  hours per night. The patient is usually getting 61/2 to 7 hours of sleep per night.    Previous work on insomnia: no  Sleep meds : no  Consistent with primary problem being mid of night (occasionally initial, and terminal)  Mind worry-occasionally  Change in next day activities:  rare  Clock: check-yes then plans    She does feel rested in the morning-sometimes despite night of significant fragmentation  ResMed     Auto-PAP 5.0 - 10.0 cmH2O 30 day usage data:    93% of days with > 4 hours of use. 0/30 days with no use.   Average use 387 minutes per day.   95%ile Leak 13.43 L/min.   CPAP 95% pressure 9.8 cm.   AHI 0.31 events per hour.     Total score - [place]: 6 (4/7/2020 12:00 PM)    JAMEL scale 13-14/28    Referral to cbti  Prefers face to face compared to online  Follow up in 1 year with me      Allergies:    Allergies   Allergen Reactions     No Known Drug Allergies        Medications:    Current Outpatient Medications   Medication Sig Dispense Refill     lisinopril (ZESTRIL) 20 MG tablet Take 1 tablet (20 mg) by mouth At Bedtime 90 tablet 3     Red Yeast Rice Extract (RED YEAST RICE PO) Take 2 capsule daily         Problem List:  Patient Active Problem List    Diagnosis Date Noted     HIRAM (obstructive sleep apnea) 01/29/2020     Priority: Medium     1/28/2020 [place] Split Sleep Study (157.0 lbs) - AHI 30.4, RDI 32.3, Supine AHI 32.3, REM .0, Low O2% 72.4%, Time Spent ?88% 25.9, Time Spent ?89% 44.6. Treatment was titrated to a pressure of CPAP 6 with an AHI -. Time spent in REM supine at this pressure was - minutes.       Benign essential hypertension 04/04/2019      Priority: Medium     Fatigue, unspecified type 10/03/2017     Priority: Medium     Need for hepatitis C screening test 10/03/2017     Priority: Medium     Pelvic relaxation 07/05/2016     Priority: Medium     Family history of colon cancer 07/05/2016     Priority: Medium     Skin lesion of left arm 07/05/2016     Priority: Medium     Hearing loss in right ear 03/07/2016     Priority: Medium     since childhood.        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     Priority: Medium     Vitamin D deficiency 06/06/2013     Priority: Medium     Impaired fasting blood sugar 06/05/2013     Priority: Medium     Intraductal carcinoma, noninfiltrating 04/03/2013     Priority: Medium     Noted by cancer registry, I'm not sure who is following her, see scan 4/3/2013        Environmental allergies 02/17/2012     Priority: Medium     Overweight (BMI 25.0-29.9) 01/20/2012     Priority: Medium     Hypercholesterolemia 02/18/2011     Priority: Medium     [facility] 3/18/2010 , , HDL 37,        Hyperlipidemia LDL goal <130 02/18/2011     Priority: Medium     [place] risk 5% intermediate age 60       Advance Care Planning 01/19/2011     Priority: Medium     Advance Care Planning: Initial facilitation introduction:   Teetee MELCHOR Dalal presented for initial session regarding ACP at a group session. She was accompanied by no one. [facility] information provided and resources reviewed. She currently wishes to give additional consideration to ACP prior to documenting choices.  She currently has the following questions or concerns about Advance Care Planning: none.  Confirmed/documented designated decision maker(s). See permanent comments section of demographics in clinical tab. Added by Elvia Groves on 3/5/2015  1/19/11  We discussed her existing Health Care Directive drawn up by her  " .  Patient was given additional materials  on CPR ,Tube feeding, and ventilator information Ppatient has decided to review the information and revised her existing document.  Patient was given the option to return for another visit with me to finalize document with her healthcare agents or to return the completed document for abstraction.  Perla Bishop RN  Chronic Care Coordinator           Skin lesion of chest wall 11/18/2010     Priority: Medium     Skin lesion of right arm 11/18/2010     Priority: Medium     Hx of colonic polyps 04/22/2010     Priority: Medium     Personal history of malignant neoplasm of breast 04/22/2010     Priority: Medium     Other specified genital prolapse(618.89) 01/16/2006     Priority: Medium        Past Medical/Surgical History:  Past Medical History:   Diagnosis Date     Breast cancer (H) 9/10/96     Change in stool habits 7/5/2016     Elevated blood pressure 1/20/2012     Elevated blood pressure reading without diagnosis of hypertension 10/3/2017     Encounter for screening colonoscopy 6/17/2011    High risk, recommended every 5 years, see scan 6/6/11, nromal  (Problem list name updated by automated process. Provider to review and confirm.)     Hearing loss      Hyperlipidemia LDL goal <130 2/18/2011    [place] risk 5% intermediate age 60      Hypertension     On occasion     Intraductal carcinoma, noninfiltrating 4/3/2013    Noted by cancer registry, I'm not sure who is following her, see scan 4/3/2013      Nasal congestion      Screening colonoscopy 6/17/2011     Past Surgical History:   Procedure Laterality Date     HYSTERECTOMY TOTAL ABDOMINAL, BILATERAL SALPINGO-OOPHORECTOMY, COMBINED  2/18/2004    lumpectomy left breast - [place]     LUMPECTOMY BREAST  1996     SURGICAL HISTORY OF -   9/03    hyperplastic colonic polyps - benign           Physical Examination:  Vitals: Ht 1.549 m (5' 0.98\")   Wt 71.2 kg (157 lb)   LMP  (LMP Unknown)   BMI 29.68 kg/m    BMI= Body " mass index is 29.68 kg/m .         [place] Total Score 4/7/2020   Total score - [place] 6       GENERAL tone of conversation:: healthy, alert, no significant distress    A/P: good coverage of geeta, fair response to cpap mask, not too comfortable.  Willing to do mask fit when pandemic limitations are lifted. Recommend saline for nasal congestion    1.geeta, good response to tiredness but sleep remains nonrestorative see #2 below.  Mask fit and saline for nose, follow up in 1 year  2. Insomnia: fatigue persists, JAMEL 13-14/28.  Wishes cbti,-face to face. Referral provided.  3. Overweight: weightloss maybe helpful in reducing pressures of treatment or severity of geeta, encouraged to be active.    Time spent with patient is 25 minutes, of which >50% was spent in counseling, education and coordination of care.

## 2020-04-07 NOTE — PATIENT INSTRUCTIONS
I have placed an order for [facility] medical to contact you: you may benefit from a mask fit to try a different nasal pillows or different style of mask with a different footprint     Recommend: Use saline spray product (ocean complete or arm and hammer are easy to use) in shower where nose naturally opens up. Use another time of day with continue congestion over the sink.      Until you are seen again in clinic in the next 12 months, please watch for a return of sleepiness. An increase in sleepiness while using cpap for obstructive sleep apnea is usually due to a change in the followin) a decrease in usage of cpap  2) decrease in amount of time slept  3) a poor seal (often a function of not following cleaning recommendations or  replacement guidelines)  4)  increase in weight or use of sedating medications resulting in higher pressure needs     If you have tried to address these issues but are still sleepy, please call for an earlier appointment.    Please, do not drive if sleepy         Cognitive Behavioral Therapy for Insomnia (CBT-I)    What is CBT-I?    Cognitive Behavioral Therapy for Insomnia, also known as CBT-I, is a highly effective non-drug treatment for insomnia. The American College of Physicians recommends CBT-I as the first treatment for chronic insomnia.  Research has shown CBT-I to be safer and more effective long term than sleeping pills.    What does CBT-I involve?     CBT-I targets behaviors that lead to chronic insomnia:    Habits that weaken the bed as a cue for sleep    Habits that weaken your body's sleep drive and sleep/wake clock     Unhelpful sleep thoughts that increase sleep-related worry and arousal.    The process works like a training program that provides you with the information and coaching needed to implement proven strategies to get a better night's sleep.    The [facility] Insomnia Program offers several effective treatment options.  You can do CBT-I from the  convenience of your own home through our Online CBT-I and Virtual CBT-I options. Our program also offers in-person CBTI-I at certain primary care clinics, specialty clinics, and   [facility].    How much effort will it take?    To get the full benefit from CBT-I, you will need to put into practice the strategies recommended as part of your personalized program.  You will keep a daily sleep diary throughout treatment to record your sleep patterns and progress.      How long will it take to work?    People often see improvement in their sleep within a few weeks. Research shows if you keep practicing the skills you learn your sleep is likely to continue to improve 6-12 months after treatment.    Are there side effects?    Unlike many prescribed sleeping pills, CBT-I is a safe treatment with few side effects.  During the first few weeks, you may experience an increase in daytime sleepiness.     What about sleep medication?    Some people choose to stop using sleep medication prior to beginning CBT-I.  Others gradually reduce or stop using medication during treatment with the guidance of their prescribing provider. Always talk with you prescribing provider before making any changes to your medication.    How do I get started? I have placed a referral to our sleep psychologist.  Next step should be a call to you.    [facility] at [place] ([phone]) or [place] ([phone]) are the locations where our sleep psychologist sees patients.      Your BMI is Body mass index is 29.68 kg/m . Teetee-weightloss may reduce pressure requirement or severity of HIRAM.  If you can, keep as active as possible within Covid 19 limitiations.    Weight management is a personal decision.  If you are interested in exploring weight loss strategies, the following discussion covers the approaches that may be successful. Body mass index (BMI) is one way to tell whether you are at a healthy weight,  overweight, or obese. It measures your weight in relation to your height.  A BMI of 18.5 to 24.9 is in the healthy range. A person with a BMI of 25 to 29.9 is considered overweight, and someone with a BMI of 30 or greater is considered obese. More than two-thirds of American adults are considered overweight or obese.  Being overweight or obese increases the risk for further weight gain. Excess weight may lead to heart disease and diabetes.  Creating and following plans for healthy eating and physical activity may help you improve your health.  Weight control is part of healthy lifestyle and includes exercise, emotional health, and healthy eating habits. Careful eating habits lifelong are the mainstay of weight control. Though there are significant health benefits from weight loss, long-term weight loss with diet alone may be very difficult to achieve- studies show long-term success with dietary management in less than 10% of people. Attaining a healthy weight may be especially difficult to achieve in those with severe obesity. In some cases, medications, devices and surgical management might be considered.  What can you do?  If you are overweight or obese and are interested in methods for weight loss, you should discuss this with your provider.     Consider reducing daily calorie intake by 500 calories.     Keep a food journal.     Avoiding skipping meals, consider cutting portions instead.    Diet combined with exercise helps maintain muscle while optimizing fat loss. Strength training is particularly important for building and maintaining muscle mass. Exercise helps reduce stress, increase energy, and improves fitness. Increasing exercise without diet control, however, may not burn enough calories to loose weight.       Start walking three days a week 10-20 minutes at a time    Work towards walking thirty minutes five days a week     Eventually, increase the speed of your walking for 1-2 minutes at time    In  addition, we recommend that you review healthy lifestyles and methods for weight loss available through the National Institutes of Health patient information sites:  http://win.niddk.nih.gov/publications/index.htm    And look into health and wellness programs that may be available through your health insurance provider, employer, local community center, or ladi club.

## 2020-04-14 ENCOUNTER — VIRTUAL VISIT (OUTPATIENT)
Dept: SLEEP MEDICINE | Facility: CLINIC | Age: 70
End: 2020-04-14
Payer: COMMERCIAL

## 2020-04-14 DIAGNOSIS — G47.33 OSA (OBSTRUCTIVE SLEEP APNEA): Primary | ICD-10-CM

## 2020-04-14 DIAGNOSIS — E66.3 OVERWEIGHT (BMI 25.0-29.9): ICD-10-CM

## 2020-04-14 DIAGNOSIS — F51.04 INSOMNIA, PSYCHOPHYSIOLOGICAL: ICD-10-CM

## 2020-04-14 PROCEDURE — 99214 OFFICE O/P EST MOD 30 MIN: CPT | Mod: 95 | Performed by: NURSE PRACTITIONER

## 2020-04-15 ENCOUNTER — TELEPHONE (OUTPATIENT)
Dept: SLEEP MEDICINE | Facility: CLINIC | Age: 70
End: 2020-04-15

## 2020-04-15 NOTE — TELEPHONE ENCOUNTER
Per [facility]'s message to call patient for a mask fit because patient did not respond to the mask exchange and thought they were all the same.     I called patient today 04/15/2020 at 10:10 am regarding getting a new mask. Per patient she would like to wait to come in to try on the mask. She said she's probably not use to the mask and maybe a new mask wouldn't make a different since she's not use to having a mask on her face at night. I let her know it looks like she is doing good with with using the machine and is meeting compliance. I let her know we can honor the mask exchange now and ship it to her home but if she wants to wait we will bill it to her insurance the next time since she is eligible for a new mask in May 2020. She said that's fine and will wait. I let her know to call us if she decides to get the new mask this week and we will honor the mask exchange.

## 2020-04-30 ENCOUNTER — VIRTUAL VISIT (OUTPATIENT)
Dept: FAMILY MEDICINE | Facility: CLINIC | Age: 70
End: 2020-04-30
Payer: COMMERCIAL

## 2020-04-30 DIAGNOSIS — I10 BENIGN ESSENTIAL HYPERTENSION: Primary | ICD-10-CM

## 2020-04-30 PROCEDURE — 99214 OFFICE O/P EST MOD 30 MIN: CPT | Mod: 95 | Performed by: FAMILY MEDICINE

## 2020-04-30 RX ORDER — LOSARTAN POTASSIUM 50 MG/1
50 TABLET ORAL DAILY
Qty: 30 TABLET | Refills: 1 | Status: SHIPPED | OUTPATIENT
Start: 2020-04-30 | End: 2020-06-25

## 2020-04-30 NOTE — PROGRESS NOTES
"Teetee Dalal is a 69 year old female who is being evaluated via a billable video visit.      The patient has been notified of following:     \"This video visit will be conducted via a call between you and your physician/provider. We have found that certain health care needs can be provided without the need for an in-person physical exam.  This service lets us provide the care you need with a video conversation.  If a prescription is necessary we can send it directly to your pharmacy.  If lab work is needed we can place an order for that and you can then stop by our lab to have the test done at a later time.    Video visits are billed at different rates depending on your insurance coverage.  Please reach out to your insurance provider with any questions.    If during the course of the call the physician/provider feels a video visit is not appropriate, you will not be charged for this service.\"    Patient has given verbal consent for Video visit? Yes    How would you like to obtain your AVS? Nish    Patient would like the video invitation sent by: Send to e-mail at: [email or web address]    Will anyone else be joining your video visit? No      Subjective     Teetee Dalal is a 69 year old female who presents to clinic today for the following health issues:    HPI  Hypertension Follow-up      Do you check your blood pressure regularly outside of the clinic? Yes     They've been high at home, even with dose increase to 40 mg for the past 2 weeks    She's having a cough she believes is due to the lisinopril, also uses cpap, and has chronic sinusitis, so always has a dry irritated throat    Also has a bad taste in the back of her throat, stopped cpap, using salt water gargles and drinking vinegar     157/93 yesterday reading     She denies chest pain, sob, headaches    Are you following a low salt diet? Yes    Are your blood pressures ever more than 140 on the top number (systolic) OR more   than 90 on the bottom " number (diastolic), for example 140/90? Yes      How many servings of fruits and vegetables do you eat daily?  4 or more    On average, how many sweetened beverages do you drink each day (Examples: soda, juice, sweet tea, etc.  Do NOT count diet or artificially sweetened beverages)?   0    How many days per week do you exercise enough to make your heart beat faster? 4    How many minutes a day do you exercise enough to make your heart beat faster? 20 - 29    How many days per week do you miss taking your medication? 0  BP Readings from Last 3 Encounters:   01/14/20 114/71   12/19/19 120/70   12/04/19 122/72          Video Start Time: 2:43 PM    Health Maintenance Due   Topic Date Due     DEXA  1950     ZOSTER IMMUNIZATION (1 of 2) 10/13/1998     MAMMO SCREENING  05/17/2018     LIPID  02/19/2020     BMP  04/04/2020     DTAP/TDAP/TD IMMUNIZATION (2 - Tdap) 04/22/2020      Patient Active Problem List   Diagnosis     Other specified genital prolapse(618.89)     Skin lesion of chest wall     Skin lesion of right arm     Advance Care Planning     Hypercholesterolemia     Hyperlipidemia LDL goal <130     Overweight (BMI 25.0-29.9)     Environmental allergies     Intraductal carcinoma, noninfiltrating     Impaired fasting blood sugar     Vitamin D deficiency     Hearing loss in right ear     Pelvic relaxation     Family history of colon cancer     Skin lesion of left arm     Fatigue, unspecified type     Need for hepatitis C screening test     Hx of colonic polyps     Hypertropia     Intermittent esotropia, alternating     Personal history of malignant neoplasm of breast     Superior oblique palsy     Benign essential hypertension     HIRAM (obstructive sleep apnea)     Past Surgical History:   Procedure Laterality Date     HYSTERECTOMY TOTAL ABDOMINAL, BILATERAL SALPINGO-OOPHORECTOMY, COMBINED  2/18/2004    lumpectomy left breast - [place]     LUMPECTOMY BREAST  1996     SURGICAL HISTORY OF -   9/03    hyperplastic colonic  polyps - benign       Social History     Tobacco Use     Smoking status: Former Smoker     Packs/day: 0.25     Years: 5.00     Pack years: 1.25     Types: Cigarettes     Smokeless tobacco: Never Used   Substance Use Topics     Alcohol use: Yes     Alcohol/week: 2.0 standard drinks     Types: 2 Glasses of wine per week     Frequency: 2-4 times a month     Drinks per session: 1 or 2     Binge frequency: Never     Comment: moderate     Family History   Problem Relation Age of Onset     Hypertension Mother      Breast Cancer Mother         12 years ago     Depression Mother      Psychotic Disorder Mother      Diabetes Father      Hypertension Father      Alcohol/Drug Father          Current Outpatient Medications   Medication Sig Dispense Refill     losartan (COZAAR) 50 MG tablet Take 1 tablet (50 mg) by mouth daily 30 tablet 1     Red Yeast Rice Extract (RED YEAST RICE PO) Take 2 capsule daily       Allergies   Allergen Reactions     No Known Drug Allergies      Recent Labs   Lab Test 11/19/19  1031 04/04/19  1319 12/20/18  1503 10/03/17  1240 06/05/13  0808   A1C 5.4  --   --  5.5  --    *  --   --  130* 139*   HDL 37*  --   --  38* 38*   TRIG 284*  --   --  347* 130   ALT  --  27  --  31  --    CR  --  0.68 0.71 0.64 0.55   GFRESTIMATED  --  88 86 >90 >90   GFRESTBLACK  --  >90 >90 >90 >90   POTASSIUM  --  4.0 3.6 4.2 3.6   TSH 1.20  --   --  1.10 1.37      BP Readings from Last 3 Encounters:   01/14/20 114/71   12/19/19 120/70   12/04/19 122/72    Wt Readings from Last 3 Encounters:   04/07/20 71.2 kg (157 lb)   01/14/20 71.2 kg (157 lb)   12/19/19 69.6 kg (153 lb 8 oz)          Reviewed and updated as needed this visit by Provider         Review of Systems   ROS COMP: Constitutional, HEENT, cardiovascular, pulmonary, GI, , musculoskeletal, neuro, skin, endocrine and psych systems are negative, except as otherwise noted.      Objective    LMP  (LMP Unknown)   Estimated body mass index is 29.68 kg/m  as  "calculated from the following:    Height as of 4/7/20: 1.549 m (5' 0.98\").    Weight as of 4/7/20: 71.2 kg (157 lb).  Physical Exam     GENERAL: healthy, alert and no distress  EYES: Eyes grossly normal to inspection, conjunctivae and sclerae normal  RESP: no audible wheeze, cough, or visible cyanosis.  No visible retractions or increased work of breathing.  Able to speak fully in complete sentences.  NEURO: Cranial nerves grossly intact, mentation intact and speech normal  PSYCH: mentation appears normal, affect normal/bright, judgement and insight intact, normal speech and appearance well-groomed      Diagnostic Test Results:  Labs reviewed in Epic        Assessment & Plan     1. Benign essential hypertension  Not at goal, side effects on lisinopril, will switch to losartan  Previously at goal, so may be impacted by stress of covid quarantine,  Follow up 2 weeks, sooner if adverse side effects or blood pressure very high  The patient indicates understanding of these issues and agrees with the plan.   Will obtain labs in 2-4 weeks  - losartan (COZAAR) 50 MG tablet; Take 1 tablet (50 mg) by mouth daily  Dispense: 30 tablet; Refill: 1         Return in about 2 weeks (around 5/14/2020) for Hypertension follow up.    Sandy Hargrove MD  [facility]      Video-Visit Details    Type of service:  Video Visit    Video End Time:2:57 PM    Originating Location (pt. Location): Home    Distant Location (provider location):  [facility]     Platform used for Video Visit: AmWell    Return in about 2 weeks (around 5/14/2020) for Hypertension follow up.       Sandy Hargrove MD      "

## 2020-05-14 ENCOUNTER — VIRTUAL VISIT (OUTPATIENT)
Dept: FAMILY MEDICINE | Facility: CLINIC | Age: 70
End: 2020-05-14
Payer: COMMERCIAL

## 2020-05-14 DIAGNOSIS — R09.82 POSTNASAL DRIP: ICD-10-CM

## 2020-05-14 DIAGNOSIS — I10 HYPERTENSION GOAL BP (BLOOD PRESSURE) < 150/90: Primary | ICD-10-CM

## 2020-05-14 DIAGNOSIS — R05.9 COUGH: ICD-10-CM

## 2020-05-14 PROCEDURE — 99213 OFFICE O/P EST LOW 20 MIN: CPT | Mod: 95 | Performed by: FAMILY MEDICINE

## 2020-05-14 RX ORDER — LOSARTAN POTASSIUM 50 MG/1
75 TABLET ORAL DAILY
Qty: 135 TABLET | Refills: 3
Start: 2020-05-14 | End: 2020-07-09

## 2020-05-14 NOTE — PATIENT INSTRUCTIONS
Shingles vaccine  I strongly recommend getting the shingles vaccine (Shingrix) if you are over age 50, but please check with your insurance to see how much you might have to pay for this vaccine! If you are on most insurance plans including Medicare, it's covered if you get it at a pharmacy but not in a clinic.    This shot will prevent shingles, a painful skin rash that you are at risk for getting if you have ever had chicken pox. Sometimes the pain will last the rest of your life, even after the rash has healed, and there is not much that we can do to relieve the pain. The vaccine is 98% effective at preventing shingles.    Please consider getting this vaccine! You'll need #2 vaccines 2-4 months apart to be protected.      You are due for a tetanus booster this year as well!    Mammograms are being scheduled!  You are due for a mammogram. Please call [phone] to schedule one at your earliest convenience, thank you!

## 2020-05-14 NOTE — PROGRESS NOTES
"Teetee Dalal is a 69 year old female who is being evaluated via a billable video visit.      The patient has been notified of following:     \"This video visit will be conducted via a call between you and your physician/provider. We have found that certain health care needs can be provided without the need for an in-person physical exam.  This service lets us provide the care you need with a video conversation.  If a prescription is necessary we can send it directly to your pharmacy.  If lab work is needed we can place an order for that and you can then stop by our lab to have the test done at a later time.    Video visits are billed at different rates depending on your insurance coverage.  Please reach out to your insurance provider with any questions.    If during the course of the call the physician/provider feels a video visit is not appropriate, you will not be charged for this service.\"    Patient has given verbal consent for Video visit? Yes    How would you like to obtain your AVS? Nish    Patient would like the video invitation sent by: Send to e-mail at: [email or web address]    Will anyone else be joining your video visit? No       Video Start Time: 3:23 PM    Subjective     Teetee Dalal is a 69 year old female who presents today via video visit for the following health issues:    HPI  Hypertension Follow-up      Do you check your blood pressure regularly outside of the clinic? Yes 153/87    Are you following a low salt diet? No     Are your blood pressures ever more than 140 on the top number (systolic) OR more   than 90 on the bottom number (diastolic), for example 140/90? No      How many servings of fruits and vegetables do you eat daily?  4 or more    On average, how many sweetened beverages do you drink each day (Examples: soda, juice, sweet tea, etc.  Do NOT count diet or artificially sweetened beverages)?   0    How many days per week do you exercise enough to make your heart beat faster? " 7    How many minutes a day do you exercise enough to make your heart beat faster? 30 - 60    How many days per week do you miss taking your medication? 0        Concern - cough with phlegm   Onset: several months ; losartan seems to be causing a cough with phlegm production   She was taking carvedilol before, stopped because of cough she thought she was caused by  Associated symptoms include sneezing and coughing which has improved  She has chronic post nasal drip as well    Description: thinks the cough is side effect from bp medication   Cough     Intensity: mild    Progression of Symptoms:  same    Accompanying Signs & Symptoms:coughing up phlegm  phlagm   Therapies Tried and outcome: none    Hypertension Follow-up      Do you check your blood pressure regularly outside of the clinic? Yes     She takes it at night    Are you following a low salt diet? Yes    Are your blood pressures ever more than 140 on the top number (systolic) OR more   than 90 on the bottom number (diastolic), for example 140/90? Yes   153/87    BP Readings from Last 3 Encounters:   01/14/20 114/71   12/19/19 120/70   12/04/19 122/72      GFR Estimate   Date Value Ref Range Status   04/04/2019 88 >60 mL/min/[1.73_m2] Final     Comment:     Non  GFR Calc  Starting 12/18/2018, serum creatinine based estimated GFR (eGFR) will be   calculated using the Chronic Kidney Disease Epidemiology Collaboration   (CKD-EPI) equation.     12/20/2018 86 >60 mL/min/[1.73_m2] Final     Comment:     Non  GFR Calc  Starting 12/18/2018, serum creatinine based estimated GFR (eGFR) will be   calculated using the Chronic Kidney Disease Epidemiology Collaboration   (CKD-EPI) equation.     10/03/2017 >90 >60 mL/min/1.7m2 Final     Comment:     Non  GFR Calc       Patient Active Problem List   Diagnosis     Other specified genital prolapse(618.89)     Skin lesion of chest wall     Skin lesion of right arm     Advance Care  Planning     Hypercholesterolemia     Hyperlipidemia LDL goal <130     Overweight (BMI 25.0-29.9)     Environmental allergies     Intraductal carcinoma, noninfiltrating     Impaired fasting blood sugar     Vitamin D deficiency     Hearing loss in right ear     Pelvic relaxation     Family history of colon cancer     Skin lesion of left arm     Fatigue, unspecified type     Need for hepatitis C screening test     Hx of colonic polyps     Hypertropia     Intermittent esotropia, alternating     Personal history of malignant neoplasm of breast     Superior oblique palsy     HIRAM (obstructive sleep apnea)     Hypertension goal BP (blood pressure) < 150/90     Past Surgical History:   Procedure Laterality Date     HYSTERECTOMY TOTAL ABDOMINAL, BILATERAL SALPINGO-OOPHORECTOMY, COMBINED  2/18/2004    lumpectomy left breast - [place]     LUMPECTOMY BREAST  1996     SURGICAL HISTORY OF -   9/03    hyperplastic colonic polyps - benign       Social History     Tobacco Use     Smoking status: Former Smoker     Packs/day: 0.25     Years: 5.00     Pack years: 1.25     Types: Cigarettes     Smokeless tobacco: Never Used   Substance Use Topics     Alcohol use: Yes     Alcohol/week: 2.0 standard drinks     Types: 2 Glasses of wine per week     Frequency: 2-4 times a month     Drinks per session: 1 or 2     Binge frequency: Never     Comment: moderate     Family History   Problem Relation Age of Onset     Hypertension Mother      Breast Cancer Mother         12 years ago     Depression Mother      Psychotic Disorder Mother      Diabetes Father      Hypertension Father      Alcohol/Drug Father          Current Outpatient Medications   Medication Sig Dispense Refill     losartan (COZAAR) 50 MG tablet Take 1 tablet (50 mg) by mouth daily 30 tablet 1     Red Yeast Rice Extract (RED YEAST RICE PO) Take 2 capsule daily       Allergies   Allergen Reactions     No Known Drug Allergies      Recent Labs   Lab Test 11/19/19  1031 04/04/19  8597  "12/20/18  1503 10/03/17  1240 06/05/13  0808   A1C 5.4  --   --  5.5  --    *  --   --  130* 139*   HDL 37*  --   --  38* 38*   TRIG 284*  --   --  347* 130   ALT  --  27  --  31  --    CR  --  0.68 0.71 0.64 0.55   GFRESTIMATED  --  88 86 >90 >90   GFRESTBLACK  --  >90 >90 >90 >90   POTASSIUM  --  4.0 3.6 4.2 3.6   TSH 1.20  --   --  1.10 1.37      BP Readings from Last 3 Encounters:   01/14/20 114/71   12/19/19 120/70   12/04/19 122/72    Wt Readings from Last 3 Encounters:   04/07/20 71.2 kg (157 lb)   01/14/20 71.2 kg (157 lb)   12/19/19 69.6 kg (153 lb 8 oz)                    Reviewed and updated as needed this visit by Provider         Review of Systems   Constitutional, HEENT, cardiovascular, pulmonary, GI, , musculoskeletal, neuro, skin, endocrine and psych systems are negative, except as otherwise noted.      Objective    LMP  (LMP Unknown)   Estimated body mass index is 29.68 kg/m  as calculated from the following:    Height as of 4/7/20: 1.549 m (5' 0.98\").    Weight as of 4/7/20: 71.2 kg (157 lb).  Physical Exam     GENERAL: Healthy, alert and no distress  EYES: Eyes grossly normal to inspection.  No discharge or erythema, or obvious scleral/conjunctival abnormalities.  RESP: No audible wheeze, cough, or visible cyanosis.  No visible retractions or increased work of breathing.    SKIN: Visible skin clear. No significant rash, abnormal pigmentation or lesions.  NEURO: Cranial nerves grossly intact.  Mentation and speech appropriate for age.  PSYCH: Mentation appears normal, affect normal/bright, judgement and insight intact, normal speech and appearance well-groomed.      Diagnostic Test Results:  Labs reviewed in Epic  LDL Cholesterol Calculated   Date Value Ref Range Status   11/19/2019 164 (H) <100 mg/dL Final     Comment:     Above desirable:  100-129 mg/dl  Borderline High:  130-159 mg/dL  High:             160-189 mg/dL  Very high:       >189 mg/dl       Lab Results   Component Value Date "    MICROL 11 11/19/2019     No results found for: MICROALBUMIN          Assessment & Plan     1. Hypertension goal BP (blood pressure) < 150/90  Not quite at goal, increased losartan to 75 mg today, continue to check blood pressure and let me know if still elevated over the next 2 weeks  - losartan (COZAAR) 50 MG tablet; Take 1.5 tablets (75 mg) by mouth daily  Dispense: 135 tablet; Refill: 3    2. Cough  Related to  3. Postnasal drip  Good job with neti pot, can consider nasal steroid, repeat allergy testing (last done 2013, was normal/negative), she didn't want any changes for now    Return in about 7 months (around 12/20/2020) for Wellness Exam.    Sandy Hargrove MD  [facility]      Video-Visit Details    Type of service:  Video Visit    Video End Time:3:39 PM    Originating Location (pt. Location): Home    Distant Location (provider location):  [facility]     Platform used for Video Visit: AmWell    Return in about 7 months (around 12/20/2020) for Wellness Exam.       Sandy Hargrove MD

## 2020-06-24 DIAGNOSIS — I10 BENIGN ESSENTIAL HYPERTENSION: ICD-10-CM

## 2020-06-25 RX ORDER — LOSARTAN POTASSIUM 50 MG/1
75 TABLET ORAL DAILY
Qty: 135 TABLET | Refills: 2 | Status: SHIPPED | OUTPATIENT
Start: 2020-06-25 | End: 2020-07-22

## 2020-07-08 ENCOUNTER — MYC MEDICAL ADVICE (OUTPATIENT)
Dept: FAMILY MEDICINE | Facility: CLINIC | Age: 70
End: 2020-07-08

## 2020-07-08 DIAGNOSIS — J31.0 CHRONIC RHINITIS: Primary | ICD-10-CM

## 2020-07-08 DIAGNOSIS — R05.9 COUGH: ICD-10-CM

## 2020-07-08 DIAGNOSIS — R09.82 POSTNASAL DRIP: ICD-10-CM

## 2020-07-09 RX ORDER — FLUTICASONE PROPIONATE 50 MCG
1-2 SPRAY, SUSPENSION (ML) NASAL DAILY
Qty: 18.2 ML | Refills: 4 | Status: SHIPPED | OUTPATIENT
Start: 2020-07-09 | End: 2021-09-30

## 2020-07-22 ENCOUNTER — E-VISIT (OUTPATIENT)
Dept: FAMILY MEDICINE | Facility: CLINIC | Age: 70
End: 2020-07-22

## 2020-07-22 ENCOUNTER — VIRTUAL VISIT (OUTPATIENT)
Dept: FAMILY MEDICINE | Facility: CLINIC | Age: 70
End: 2020-07-22
Payer: COMMERCIAL

## 2020-07-22 DIAGNOSIS — J31.0 CHRONIC RHINITIS: Primary | ICD-10-CM

## 2020-07-22 DIAGNOSIS — Z53.9 ERRONEOUS ENCOUNTER--DISREGARD: Primary | ICD-10-CM

## 2020-07-22 DIAGNOSIS — Z23 NEED FOR VACCINATION: ICD-10-CM

## 2020-07-22 DIAGNOSIS — I10 BENIGN ESSENTIAL HYPERTENSION: ICD-10-CM

## 2020-07-22 PROCEDURE — 99214 OFFICE O/P EST MOD 30 MIN: CPT | Mod: 95 | Performed by: FAMILY MEDICINE

## 2020-07-22 RX ORDER — LOSARTAN POTASSIUM 50 MG/1
100 TABLET ORAL DAILY
Qty: 135 TABLET | Refills: 2
Start: 2020-07-22 | End: 2022-05-24

## 2020-07-22 NOTE — TELEPHONE ENCOUNTER
Provider E-Visit time total (minutes): n/a      This encounter was opened in error. Please disregard.

## 2020-07-22 NOTE — PATIENT INSTRUCTIONS
Try the nasal steroid spray for 2-4 weeks.  If no improvement of your nasal congestion symptoms then schedule with ENT.    Increase the losartan to 100 mg daily.  You can take two of the 50 mg tablets that you have and if the 100 mg dose works better let us know so we can switch you to 100 mg tablets.    Schedule a fasting lab-only appointment. Fast for 10 hours prior to labs: nothing to eat or drink except plain water and your pills for ten hours prior to appointment.  In addition, avoid fatty foods and alcohol for 24 hours prior to appointment.     You can also schedule a Medical Assistant (MA)-only appointment on the same visit as the lab appointment to get your tetanus booster.

## 2020-07-22 NOTE — PROGRESS NOTES
"Teetee Dalal is a 69 year old female who is being evaluated via a billable video visit.       The patient has been notified of following:     \"This video visit will be conducted via a call between you and your physician/provider. We have found that certain health care needs can be provided without the need for an in-person physical exam.  This service lets us provide the care you need with a video conversation.  If a prescription is necessary we can send it directly to your pharmacy.  If lab work is needed we can place an order for that and you can then stop by our lab to have the test done at a later time.    Video visits are billed at different rates depending on your insurance coverage.  Please reach out to your insurance provider with any questions.    If during the course of the call the physician/provider feels a video visit is not appropriate, you will not be charged for this service.\"    Patient has given verbal consent for Video visit? Yes  How would you like to obtain your AVS? MyChart  If you are dropped from the video visit, the video invite should be resent to: Text to cellphone [phone] [employer]  Will anyone else be joining your video visit? No      Subjective     Teetee Dalal is a 69 year old female who presents today via video visit for the following health issues:    HPI    Chronic nasal congestion  x 10 years  Post-nasal drainage with sour taste in mouth recently  White mucus  Advised by Dr. Hargrove to try flonase but using saline nasal irrigation - feels she tried flonase in the past (but that was years ago).  Drinking water and laura tea  No sneezing, itchy eyes/nose  Had allergy testing 10 years ago - negative  Saw ENT previously (also around 10 years ago) - no identified pathology.  No heartburn  No fevers  No sinus pain/pressure  Mild ear ache last night - on left - now resolved.  No hoarseness or change in vocal quality.      Hypertension Follow-up  She's been taking losartan 75 mg " "daily.    Do you check your blood pressure regularly outside of the clinic? Yes  123/96, 147/88    Are your blood pressures ever more than 140 on the top number (systolic) OR more   than 90 on the bottom number (diastolic), for example 140/90? Yes         Video Start Time: 12:17 PM        BP Readings from Last 3 Encounters:   01/14/20 114/71   12/19/19 120/70   12/04/19 122/72    Wt Readings from Last 3 Encounters:   04/07/20 71.2 kg (157 lb)   01/14/20 71.2 kg (157 lb)   12/19/19 69.6 kg (153 lb 8 oz)          Reviewed and updated as needed this visit by Provider  Meds  Problems         Review of Systems   Constitutional, HEENT, pulmonary, gi systems are negative, except as otherwise noted.          Objective    Vitals - Patient Reported  Weight (Patient Reported): 65.8 kg (145 lb)  Height (Patient Reported): 158.8 cm (5' 2.5\")  BMI (Based on Pt Reported Ht/Wt): 26.1    Physical Exam   GENERAL: Healthy, alert and no distress  EYES: Eyes grossly normal to inspection.  No discharge or erythema, or obvious scleral/conjunctival abnormalities.  RESP: No audible wheeze, cough, or visible cyanosis.  No visible retractions or increased work of breathing.    SKIN: Visible skin clear. No significant rash, abnormal pigmentation or lesions.  NEURO: Cranial nerves grossly intact.  Mentation and speech appropriate for age.  PSYCH: Mentation appears normal, affect normal/bright, judgement and insight intact, normal speech and appearance well-groomed.      Diagnostic Test Results:  Labs reviewed in Epic        Assessment & Plan     1. Chronic rhinitis  Unclear etiology - allergic vs nonallergic.  I encouraged her to do another trial of nasal steroid spray as recently recommended by her PCP.  She should give that 4 weeks to  effect.  If no improvement I recommend repeat ENT eval as it has been 10 years since they last assessed her.  Consider allergy referral but she does not have other allergic symptoms.    - OTOLARYNGOLOGY " REFERRAL    2. Benign essential hypertension  suboptimal control - increase losartan to 100 mg daily.  Discussed that we can switch her to 100 mg tablets when she nexts needs refill.  I also encouraged her to schedule lab-only appointment for the labs her PCP has ordered (BMP, lipids, Vit D, and ACR).  - losartan (COZAAR) 50 MG tablet; Take 2 tablets (100 mg) by mouth daily  Dispense: 135 tablet; Refill: 2    3. Need for vaccination  I recommended that when she come in for lab-only appointment she also schedule Medical Assistant (MA)-only appointment to update her tetanus booster.  - TDAP, IM (10 - 64 YRS) - Adacel; Future        Patient Instructions   Try the nasal steroid spray for 2-4 weeks.  If no improvement of your nasal congestion symptoms then schedule with ENT.    Increase the losartan to 100 mg daily.  You can take two of the 50 mg tablets that you have and if the 100 mg dose works better let us know so we can switch you to 100 mg tablets.    Schedule a fasting lab-only appointment. Fast for 10 hours prior to labs: nothing to eat or drink except plain water and your pills for ten hours prior to appointment.  In addition, avoid fatty foods and alcohol for 24 hours prior to appointment.     You can also schedule a Medical Assistant (MA)-only appointment on the same visit as the lab appointment to get your tetanus booster.      No follow-ups on file.    Nisha Manriquez MD  [facility]      Video-Visit Details    Type of service:  Video Visit    Video End Time:12:39 PM    Originating Location (pt. Location): Home    Distant Location (provider location):  [facility]     Platform used for Video Visit: [employer]    No follow-ups on file.       Nisha Manriquez MD

## 2020-08-18 ENCOUNTER — MYC MEDICAL ADVICE (OUTPATIENT)
Dept: FAMILY MEDICINE | Facility: CLINIC | Age: 70
End: 2020-08-18

## 2020-08-26 ENCOUNTER — TRANSFERRED RECORDS (OUTPATIENT)
Dept: HEALTH INFORMATION MANAGEMENT | Facility: CLINIC | Age: 70
End: 2020-08-26

## 2020-10-05 ENCOUNTER — TRANSFERRED RECORDS (OUTPATIENT)
Dept: HEALTH INFORMATION MANAGEMENT | Facility: CLINIC | Age: 70
End: 2020-10-05

## 2020-10-19 ENCOUNTER — TRANSFERRED RECORDS (OUTPATIENT)
Dept: HEALTH INFORMATION MANAGEMENT | Facility: CLINIC | Age: 70
End: 2020-10-19

## 2020-11-07 ENCOUNTER — VIRTUAL VISIT (OUTPATIENT)
Dept: FAMILY MEDICINE | Facility: OTHER | Age: 70
End: 2020-11-07

## 2020-11-07 NOTE — PROGRESS NOTES
"Date: 2020 11:30:52  Clinician: Edilson Flood  Clinician NPI: [id number]  Patient: Teetee Dalal  Patient : 1950  Patient Address: [address] [place] [address]   [address], [place] [address]  Patient Phone: [phone]  Visit Protocol: URI  Patient Summary:  Teetee is a 70 year old ( : 1950 ) female who initiated a [facility] Visit for COVID-19 (Coronavirus) evaluation and screening. When asked the question \"Please sign me up to receive news, health information and promotions from [facility].\", Teetee responded \"Yes\".    Teetee states her symptoms started gradually 1 month or more ago. After her symptoms started, they improved and then got worse again.   Her symptoms consist of malaise, tooth pain, ageusia, a cough, and nasal congestion.   Symptom details     Nasal secretions: The color of her mucus is yellow.    Cough: Teetee coughs a few times an hour and her cough is not more bothersome at night. Phlegm comes into her throat when she coughs. She believes her cough is caused by post-nasal drip. The color of the phlegm is yellow and clear.     Tooth pain: The tooth pain is not caused by a cavity, recent dental work, or other mouth problems.      Teetee denies having vomiting, rhinitis, facial pain or pressure, myalgias, chills, sore throat, diarrhea, ear pain, headache, wheezing, fever, nausea, and anosmia. She also denies taking antibiotic medication in the past month, having recent facial or sinus surgery in the past 60 days, and having a sinus infection within the past year. She is not experiencing dyspnea.   Precipitating events  She has not recently been exposed to someone with influenza. Teetee has been in close contact with the following high risk individuals: adults 65 or older and people with asthma, heart disease or diabetes.   Pertinent COVID-19 (Coronavirus) information  Teetee does not work or volunteer as healthcare worker or a . In the past 14 days, Teetee has not worked or volunteered " at a healthcare facility or group living setting.   In the past 14 days, she also has not lived in a congregate living setting.   Teetee has not had a close contact with a laboratory-confirmed COVID-19 patient within 14 days of symptom onset.    Since December 2019, Teetee has not been tested for COVID-19 and has not had upper respiratory infection or influenza-like illness.   Pertinent medical history  Teetee does not get yeast infections when she takes antibiotics.   Teetee does not need a return to work/school note.   Weight: 145 lbs   Teetee does not smoke or use smokeless tobacco.   Weight: 145 lbs    MEDICATIONS: losartan oral, ALLERGIES: NKDA  Clinician Response:  Dear Teetee,   Your symptoms show that you may have coronavirus (COVID-19). This illness can cause fever, cough and trouble breathing. Many people get a mild case and get better on their own. Some people can get very sick.  What should I do?  We would like to test you for this virus.   1. Please call [phone] to schedule your visit. Explain that you were referred by [facility] to have a COVID-19 test. Be ready to share your [facility] visit ID number.  * If you need to schedule in [facility] please call [phone] or for [employer] employees please call [phone].  * If you need to schedule in the [place] area please call [phone]. [employer] employees call [phone].  The following will serve as your written order for this COVID Test, ordered by me, for the indication of suspected COVID [Z20.828]: The test will be ordered in [employer], our electronic health record, after you are scheduled. It will show as ordered and authorized by Reed Vargas MD.  Order: COVID-19 (Coronavirus) PCR for SYMPTOMATIC testing from [facility].   2. When it's time for your COVID test:  Stay at least 6 feet away from others. (If someone will drive you to your test, stay in the backseat, as far away from the  as you can.)   Cover your mouth and nose with a mask, tissue or  "washcloth.  Go straight to the testing site. Don't make any stops on the way there or back.      3.Starting now: Stay home and away from others (self-isolate) until:   You've had no fever---and no medicine that reduces fever---for one full day (24 hours). And...   Your other symptoms have gotten better. For example, your cough or breathing has improved. And...   At least 10 days have passed since your symptoms started.       During this time, don't leave the house except for testing or medical care.   Stay in your own room, even for meals. Use your own bathroom if you can.   Stay away from others in your home. No hugging, kissing or shaking hands. No visitors.  Don't go to work, school or anywhere else.    Clean \"high touch\" surfaces often (doorknobs, counters, handles, etc.). Use a household cleaning spray or wipes. You'll find a full list of  on the EPA website: www.epa.gov/pesticide-registration/list-n-disinfectants-use-against-sars-cov-2.   Cover your mouth and nose with a mask, tissue or washcloth to avoid spreading germs.  Wash your hands and face often. Use soap and water.  Caregivers in these groups are at risk for severe illness due to COVID-19:  o People 65 years and older  o People who live in a nursing home or long-term care facility  o People with chronic disease (lung, heart, cancer, diabetes, kidney, liver, immunologic)  o People who have a weakened immune system, including those who:   Are in cancer treatment  Take medicine that weakens the immune system, such as corticosteroids  Had a bone marrow or organ transplant  Have an immune deficiency  Have poorly controlled HIV or AIDS  Are obese (body mass index of 40 or higher)  Smoke regularly   o Caregivers should wear gloves while washing dishes, handling laundry and cleaning bedrooms and bathrooms.  o Use caution when washing and drying laundry: Don't shake dirty laundry, and use the warmest water setting that you can.  o For more tips, go to " www.cdc.gov/coronavirus/2019-ncov/downloads/10Things.pdf.    4.Sign up for [employer]. We know it's scary to hear that you might have COVID-19. We want to track your symptoms to make sure you're okay over the next 2 weeks. Please look for an email from [employer]---this is a free, online program that we'll use to keep in touch. To sign up, follow the link in the email. Learn more at [email or web address]  How can I take care of myself?   Get lots of rest. Drink extra fluids (unless a doctor has told you not to).   Take Tylenol (acetaminophen) for fever or pain. If you have liver or kidney problems, ask your family doctor if it's okay to take Tylenol.   Adults can take either:    650 mg (two 325 mg pills) every 4 to 6 hours, or...   1,000 mg (two 500 mg pills) every 8 hours as needed.    Note: Don't take more than 3,000 mg in one day. Acetaminophen is found in many medicines (both prescribed and over-the-counter medicines). Read all labels to be sure you don't take too much.   For children, check the Tylenol bottle for the right dose. The dose is based on the child's age or weight.    If you have other health problems (like cancer, heart failure, an organ transplant or severe kidney disease): Call your specialty clinic if you don't feel better in the next 2 days.       Know when to call 911. Emergency warning signs include:    Trouble breathing or shortness of breath Pain or pressure in the chest that doesn't go away Feeling confused like you haven't felt before, or not being able to wake up Bluish-colored lips or face.  Where can I get more information?    [facility] -- About COVID-19: [email or web address]   CDC -- What to Do If You're Sick: www.cdc.gov/coronavirus/2019-ncov/about/steps-when-sick.html   CDC -- Ending Home Isolation: www.cdc.gov/coronavirus/2019-ncov/hcp/disposition-in-home-patients.html   CDC -- Caring for Someone:  www.cdc.gov/coronavirus/2019-ncov/if-you-are-sick/care-for-someone.html   [facility] -- Interim Guidance for Hospital Discharge to Home: www.health.[facility].[place]/diseases/coronavirus/hcp/hospdischarge.pdf   [facility] clinical trials (COVID-19 research studies): clinicalaffairs.[facility]-clinical-trials    Below are the COVID-19 hotlines at the [place] Department of Health ([facility]). Interpreters are available.    For health questions: Call 097-232-3534 or 1-379.670.3589 (7 a.m. to 7 p.m.) For questions about schools and childcare: Call 620-322-0448 or 1-390.265.3419 (7 a.m. to 7 p.m.)    Diagnosis: Contact with and (suspected) exposure to other viral communicable diseases  Diagnosis ICD: Z20.828

## 2020-11-11 DIAGNOSIS — Z20.822 SUSPECTED COVID-19 VIRUS INFECTION: Primary | ICD-10-CM

## 2020-11-12 DIAGNOSIS — Z20.822 SUSPECTED COVID-19 VIRUS INFECTION: ICD-10-CM

## 2020-11-12 PROCEDURE — U0003 INFECTIOUS AGENT DETECTION BY NUCLEIC ACID (DNA OR RNA); SEVERE ACUTE RESPIRATORY SYNDROME CORONAVIRUS 2 (SARS-COV-2) (CORONAVIRUS DISEASE [COVID-19]), AMPLIFIED PROBE TECHNIQUE, MAKING USE OF HIGH THROUGHPUT TECHNOLOGIES AS DESCRIBED BY CMS-2020-01-R: HCPCS | Performed by: FAMILY MEDICINE

## 2020-11-13 LAB
SARS-COV-2 RNA SPEC QL NAA+PROBE: NOT DETECTED
SPECIMEN SOURCE: NORMAL

## 2021-01-15 ENCOUNTER — HEALTH MAINTENANCE LETTER (OUTPATIENT)
Age: 71
End: 2021-01-15

## 2021-03-14 ENCOUNTER — HEALTH MAINTENANCE LETTER (OUTPATIENT)
Age: 71
End: 2021-03-14

## 2021-09-30 RX ORDER — CHLORHEXIDINE GLUCONATE ORAL RINSE 1.2 MG/ML
SOLUTION DENTAL
COMMUNITY
Start: 2020-11-02 | End: 2022-05-24

## 2021-09-30 NOTE — PROGRESS NOTES
Assessment & Plan     (D22.9) Benign mole  (primary encounter diagnosis)  I discussed risks, benefits of removing lesion, including scaring, and options including doing nothing, she is bothered by mole and would like it removed today.  Plan: SHAV SKIN LESION FACE/EARS <=0.5 CM        Procedure: : After informed consent the lesion was cleansed with alcohol and injected with 1%Xylocaine with Epi.  The entire lesion was shaved off with a sharp razor blade and bleeding controlled with pressure, dressed with bacitracin and sterile lesion. EBL: minimal  Complications: none.  THe lesion was not  sent for pathology.    Due for labs, ordered as below:  (I10) Hypertension goal BP (blood pressure) < 150/90  Comment:   BP Readings from Last 3 Encounters:   10/01/21 126/70   01/14/20 114/71   12/19/19 120/70    at goal  Plan: Comprehensive metabolic panel, Albumin Random         Urine Quantitative with Creat Ratio            (E78.5) Hyperlipidemia LDL goal <130  Comment:   Plan: Lipid panel reflex to direct LDL Fasting, Lipid        panel reflex to direct LDL Fasting        Return to clinic for fasting labs    (E55.9) Vitamin D deficiency  Comment:   Plan:     (R73.01) Impaired fasting blood sugar  Comment: check annually  Plan: Hemoglobin A1c    BMI:   Estimated body mass index is 26.37 kg/m  as calculated from the following:    Height as of this encounter: 1.524 m (5').    Weight as of this encounter: 61.2 kg (135 lb).       Return in about 4 weeks (around 10/29/2021) for preventive visit (wellness/annual physical exam).    Sandy Hargrove MD  [facility]    Shalom Kingsley is a 70 year old who presents for the following health issues     HPI     Concern - Skin Tag  Onset: several months, bothering her and she would like it removed. No recent growth, bleeding, color change noted.  Description: right side under her lip  Intensity: mild  Progression of Symptoms:  same  Accompanying Signs  & Symptoms: no  Previous history of similar problem: no  Precipitating factors:        Worsened by: no  Alleviating factors:        Improved by: no      Review of Systems   Constitutional, HEENT, cardiovascular, pulmonary, GI, , musculoskeletal, neuro, skin, endocrine and psych systems are negative, except as otherwise noted.      Objective    /70 (BP Location: Right arm, Patient Position: Sitting, Cuff Size: Adult Regular)   Pulse 74   Temp 97.5  F (36.4  C) (Temporal)   Resp 16   Ht 1.524 m (5')   Wt 61.2 kg (135 lb)   LMP  (LMP Unknown)   SpO2 97%   BMI 26.37 kg/m    Body mass index is 26.37 kg/m .  Physical Exam   GENERAL: healthy, alert and no distress  NECK: no adenopathy, no asymmetry, masses, or scars and thyroid normal to palpation  SKIN: no suspicious lesions or rashes and 4 mm white smooth nodule noted of right chin, consistent with benign mole.

## 2021-10-01 ENCOUNTER — OFFICE VISIT (OUTPATIENT)
Dept: FAMILY MEDICINE | Facility: CLINIC | Age: 71
End: 2021-10-01
Payer: COMMERCIAL

## 2021-10-01 VITALS
SYSTOLIC BLOOD PRESSURE: 126 MMHG | BODY MASS INDEX: 26.5 KG/M2 | HEART RATE: 74 BPM | RESPIRATION RATE: 16 BRPM | HEIGHT: 60 IN | OXYGEN SATURATION: 97 % | TEMPERATURE: 97.5 F | DIASTOLIC BLOOD PRESSURE: 70 MMHG | WEIGHT: 135 LBS

## 2021-10-01 DIAGNOSIS — E78.5 HYPERLIPIDEMIA LDL GOAL <130: ICD-10-CM

## 2021-10-01 DIAGNOSIS — E55.9 VITAMIN D DEFICIENCY: ICD-10-CM

## 2021-10-01 DIAGNOSIS — I10 HYPERTENSION GOAL BP (BLOOD PRESSURE) < 150/90: ICD-10-CM

## 2021-10-01 DIAGNOSIS — R73.01 IMPAIRED FASTING BLOOD SUGAR: ICD-10-CM

## 2021-10-01 DIAGNOSIS — D22.9 BENIGN MOLE: Primary | ICD-10-CM

## 2021-10-01 PROCEDURE — 99213 OFFICE O/P EST LOW 20 MIN: CPT | Mod: 25 | Performed by: FAMILY MEDICINE

## 2021-10-01 PROCEDURE — 11310 SHAVE SKIN LESION 0.5 CM/<: CPT | Performed by: FAMILY MEDICINE

## 2021-10-01 ASSESSMENT — MIFFLIN-ST. JEOR: SCORE: 1043.86

## 2021-10-01 NOTE — PATIENT INSTRUCTIONS
Patient Education     Wound Care  Taking good care of your wound will help it heal. Your healthcare provider may show you how to clean and dress the wound. He or she will also explain how to tell if the wound is healing normally. If you are unsure of how to take care of the wound, ask what dressing to use and how often you should change the bandages. Below are the basic steps.   Wash your hands    Tips for washing your hands:    Use liquid soap and lather for 2 minutes. Scrub between your fingers and under your nails.    Rinse with clean, running water, keeping your fingers pointed down.    Use a paper towel to dry your hands and to turn off the faucet.  Remove the used dressing  Here are suggestions for removing the dressing:     If dressing changes cause you pain, take your pain medicine as prescribed by your healthcare provider 30 minutes before dressing changes.    Set up your supplies.    Put on disposable gloves if you re dressing a wound for someone else or your wound is infected.    Loosen the tape by pulling gently toward the wound.    Gently take off the old dressing. If the dressing is stuck to the wound, moisten it with saline (if available) or clean water.    If you have a drain or tube in the wound, be careful not to pull on it.    Remove the dressing 1 layer at a time and put it in a plastic bag. Seal the bag and put it in the trash.    Remove your gloves.  Inspect and dress the wound  Check the wound carefully:    Each time you change the dressing, check the wound carefully to be sure it s healing normally. Check that your wound appears to be pink and moist, and is free of infection.      Wash your hands again. Put on a new pair of gloves.    Clean and dress the wound as directed by your healthcare provider or nurse. Don't put anything in the wound that is not prescribed or directed by your healthcare provider. If you have a drain or tube, be careful not to pull on it. Secure the drain or tube as  well.    Put all unused supplies in a clean plastic bag. Seal the bag and store it in a clean, dry area between dressing changes.     Wash your hands again.  Call your healthcare provider  Call your healthcare provider if you see any of the following signs of a problem:     Bleeding that soaks the dressing    Pink fluid weeping from the wound    Increased drainage or drainage that is yellow, yellow-green, or foul-smelling    Increased swelling or pain, or redness or swelling in the skin around the wound    A change in the color of the wound, or if streaks develop in a direction away from the wound    The area between any stitches opens up    An increase in the size of the wound    A fever of 100.4 F (38 C) or higher, or as directed by your healthcare provider    Chills, increased fatigue, or a loss of appetite  Alex last reviewed this educational content on 11/1/2019 2000-2021 [employer]. All rights reserved. This information is not intended as a substitute for professional medical care. Always follow your healthcare professional's instructions.

## 2021-10-06 ENCOUNTER — TELEPHONE (OUTPATIENT)
Dept: FAMILY MEDICINE | Facility: CLINIC | Age: 71
End: 2021-10-06

## 2021-10-06 NOTE — TELEPHONE ENCOUNTER
Patient was seen on 10/1/21 and she believes she left her water bottle at the clinic. The bottle is red/pinl with a wool carry bag. If we have the bottle call either number and file and leave a message. No need to call back if bottle is not at the clinic.  Dalia Young   [facility]  Central Scheduler

## 2021-10-24 ENCOUNTER — HEALTH MAINTENANCE LETTER (OUTPATIENT)
Age: 71
End: 2021-10-24

## 2022-02-13 ENCOUNTER — HEALTH MAINTENANCE LETTER (OUTPATIENT)
Age: 72
End: 2022-02-13

## 2022-03-04 ENCOUNTER — OFFICE VISIT (OUTPATIENT)
Dept: URGENT CARE | Facility: URGENT CARE | Age: 72
End: 2022-03-04
Payer: COMMERCIAL

## 2022-03-04 VITALS
SYSTOLIC BLOOD PRESSURE: 137 MMHG | DIASTOLIC BLOOD PRESSURE: 81 MMHG | HEART RATE: 94 BPM | TEMPERATURE: 98.1 F | WEIGHT: 135 LBS | BODY MASS INDEX: 26.37 KG/M2 | OXYGEN SATURATION: 99 %

## 2022-03-04 DIAGNOSIS — H69.92 DYSFUNCTION OF LEFT EUSTACHIAN TUBE: Primary | ICD-10-CM

## 2022-03-04 PROCEDURE — 99213 OFFICE O/P EST LOW 20 MIN: CPT | Performed by: PHYSICIAN ASSISTANT

## 2022-03-04 ASSESSMENT — ENCOUNTER SYMPTOMS
FEVER: 0
SINUS PAIN: 0
SINUS PRESSURE: 0

## 2022-03-04 NOTE — PROGRESS NOTES
Assessment & Plan:        Plan/Clinical Decision Making:    Discussed can pop ears to help.   Gave options of trying steroid nasal spray.   At this time she would like to monitor symptoms and Follow-up as needed.         ICD-10-CM    1. Dysfunction of left eustachian tube  H69.82          At the end of the encounter, I discussed results, diagnosis, medications. Discussed red flags for immediate return to clinic/ER, as well as indications for follow up if no improvement. Patient understood and agreed to plan. Patient was stable for discharge.        Magi Ramirez PA-C on 3/4/2022 at 2:13 PM          Subjective:     HPI:    Teetee is a 71 year old female who presents to clinic today for the following health issues:  Chief Complaint   Patient presents with     Urgent Care     Ear Problem     c/o ear pain for 2 weeks     HPI    Left ear developed crackling off and on x 2 weeks.   Patient had some pain this morning and wanted to get ear looked at.   No drainage from ear.   Has had some mild off and on congestion.   No seasonal allergies.   Negative Covid test.     PMH: deaf right ear.     History obtained from the patient.    Review of Systems   Constitutional: Negative for fever.   HENT: Negative for congestion, sinus pressure and sinus pain.        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  2013-06: Vitamin D deficiency  2013-06: Impaired fasting blood sugar  2013-04: Intraductal  carcinoma, noninfiltrating  2012: Environmental allergies  2012: Elevated blood pressure  2012: Overweight (BMI 25.0-29.9)  2011: Encounter for screening colonoscopy  2011: Breast cancer (H)  2011: Uterine prolapse  2011: Hypercholesterolemia  2011: Hyperlipidemia LDL goal <130  2011: Advance Care Planning  2010: Skin lesion of chest wall  2010: Skin lesion of right arm  2010: Hx of colonic polyps  2010: Personal history of malignant neoplasm of breast  2006: Other specified genital prolapse(618.89)      Past Medical History:   Diagnosis Date     Breast cancer (H) 9/10/96     Change in stool habits 2016     Elevated blood pressure 2012     Elevated blood pressure reading without diagnosis of hypertension 10/3/2017     Encounter for screening colonoscopy 2011    High risk, recommended every 5 years, see scan 11, nromal  (Problem list name updated by automated process. Provider to review and confirm.)     Hearing loss      Hyperlipidemia LDL goal <130 2011    [place] risk 5% intermediate age 60      Hypertension     On occasion     Intraductal carcinoma, noninfiltrating 4/3/2013    Noted by cancer registry, I'm not sure who is following her, see scan 4/3/2013      Nasal congestion      Screening colonoscopy 2011       Social History     Tobacco Use     Smoking status: Former Smoker     Packs/day: 0.25     Years: 5.00     Pack years: 1.25     Types: Cigarettes     Quit date: 2000     Years since quittin.1     Smokeless tobacco: Never Used   Substance Use Topics     Alcohol use: Yes     Alcohol/week: 2.0 standard drinks     Comment: moderate             Objective:     Vitals:    22 1406   BP: 137/81   Pulse: 94   Temp: 98.1  F (36.7  C)   TempSrc: Tympanic   SpO2: 99%   Weight: 61.2 kg (135 lb)         Physical Exam   EXAM:   Pleasant, alert, appropriate appearance. NAD.  Head Exam: Normocephalic, atraumatic.  Eye Exam:   non  icteric/injection.    Ear Exam: TMs grey without bulging. Normal canals.  Normal pinna.  Nose Exam: Normal external nose.    OroPharynx Exam:  Normal buccal mucosa. Pharynx without exudate or hypertrophy.  Neck/Thyroid Exam:  No LAD.  .  Chest/Respiratory Exam: CTAB.      Results:  No results found for any visits on 03/04/22.

## 2022-04-10 ENCOUNTER — HEALTH MAINTENANCE LETTER (OUTPATIENT)
Age: 72
End: 2022-04-10

## 2022-05-24 ENCOUNTER — VIRTUAL VISIT (OUTPATIENT)
Dept: FAMILY MEDICINE | Facility: CLINIC | Age: 72
End: 2022-05-24
Payer: COMMERCIAL

## 2022-05-24 VITALS — BODY MASS INDEX: 27.34 KG/M2 | DIASTOLIC BLOOD PRESSURE: 91 MMHG | SYSTOLIC BLOOD PRESSURE: 147 MMHG | WEIGHT: 140 LBS

## 2022-05-24 DIAGNOSIS — Z00.00 ENCOUNTER FOR MEDICARE ANNUAL WELLNESS EXAM: ICD-10-CM

## 2022-05-24 DIAGNOSIS — R20.0 NUMBNESS AND TINGLING OF UPPER EXTREMITY: Primary | ICD-10-CM

## 2022-05-24 DIAGNOSIS — R53.82 CHRONIC FATIGUE: ICD-10-CM

## 2022-05-24 DIAGNOSIS — I10 HYPERTENSION GOAL BP (BLOOD PRESSURE) < 150/90: ICD-10-CM

## 2022-05-24 DIAGNOSIS — R20.2 NUMBNESS AND TINGLING OF UPPER EXTREMITY: Primary | ICD-10-CM

## 2022-05-24 DIAGNOSIS — Z12.11 SCREEN FOR COLON CANCER: ICD-10-CM

## 2022-05-24 PROCEDURE — 99214 OFFICE O/P EST MOD 30 MIN: CPT | Mod: 95 | Performed by: FAMILY MEDICINE

## 2022-05-24 PROCEDURE — G0439 PPPS, SUBSEQ VISIT: HCPCS | Mod: 95 | Performed by: FAMILY MEDICINE

## 2022-05-24 RX ORDER — AMPICILLIN TRIHYDRATE 250 MG
600 CAPSULE ORAL DAILY
COMMUNITY

## 2022-05-24 RX ORDER — AMLODIPINE BESYLATE 5 MG/1
5 TABLET ORAL AT BEDTIME
Qty: 30 TABLET | Refills: 3 | Status: SHIPPED | OUTPATIENT
Start: 2022-05-24 | End: 2022-05-24

## 2022-05-24 ASSESSMENT — ENCOUNTER SYMPTOMS
NERVOUS/ANXIOUS: 0
PARESTHESIAS: 0
JOINT SWELLING: 0
DIARRHEA: 0
SHORTNESS OF BREATH: 0
EYE PAIN: 0
MYALGIAS: 0
PALPITATIONS: 0
ABDOMINAL PAIN: 0
DYSURIA: 0
CHILLS: 0
NAUSEA: 0
HEARTBURN: 0
COUGH: 0
WEAKNESS: 0
SORE THROAT: 0
HEMATOCHEZIA: 0
HEADACHES: 0
DIZZINESS: 0
ARTHRALGIAS: 0
FREQUENCY: 0
HEMATURIA: 0
FEVER: 0
BREAST MASS: 0
CONSTIPATION: 0

## 2022-05-24 ASSESSMENT — ACTIVITIES OF DAILY LIVING (ADL): CURRENT_FUNCTION: NO ASSISTANCE NEEDED

## 2022-05-24 ASSESSMENT — PATIENT HEALTH QUESTIONNAIRE - PHQ9: SUM OF ALL RESPONSES TO PHQ QUESTIONS 1-9: 5

## 2022-05-24 NOTE — PROGRESS NOTES
"SUBJECTIVE:   Teetee Dalal is a 71 year old female who presents for Preventive Visit.  Start of video: 9:20 AM     Tingling in both arms  Teetee reports tingling of the backs of her arms into her hands that lasts all day, not triggers or relieving factors noticed. Symptoms are mild so she thinks they're constant but sometimes she doesn't notice them because symptoms are so mild.  Are you in the first 12 months of your Medicare coverage?  No    BP Readings from Last 3 Encounters:   05/24/22 (!) 147/91   03/04/22 137/81   10/01/21 126/70      Hypertension Follow-up      Do you check your blood pressure regularly outside of the clinic? Yes     Are you following a low salt diet? Yes    Are your blood pressures ever more than 140 on the top number (systolic) OR more   than 90 on the bottom number (diastolic), for example 140/90? Yes usually 140's/90's  She was taking losartan but didn't notice any change in blood pressure.  She doesn't exercise regularly but she does garden and stays active running errands.   She has noticed lower blood pressure when she was walking to  clinic when it was still in [place]  She lives in [facility], can walk inside for exercise during the winter.  Treatment complicated by intermittent light headedness, noticed after eating breakfast, usually has an egg or oatmeal or granola.    Fatigue  She reports feeling best in the morning, starts feeling a \"crash\" around 10 am, after eating breakfast, really seems related to eating.  She reports sleeping well. Staying active keeps her energized. She doesn't usually nap, although yesterday took at nap at 4 pm.  She denies frequent urination. She has prior hx of sleep apnea, used bipap but couldn't sleep with it, has had fewer symptoms since then.  She sleeps 7 - 8 hours per night. Usually sleeps from 10 pm to 5 or 6 pm. Sometimes will wake at 2 am and can't fall back asleep for 2-3 hours, does sleep in the next morning.    She reports eating regular " "meals, eats when she's hungry. She had lost weight during covid due to bad taste in her mouth. She started drinking apple cider vinegar, sips during the day. She reduced carbohydrates. She's been gaining weight back since adding back in a little carbohydrates.  She does feel she was depressed over the winter.    PHQ 10/27/2015 12/20/2018 5/24/2022   PHQ-9 Total Score 11 8 5   Q9: Thoughts of better off dead/self-harm past 2 weeks Not at all Not at all Not at all     Wt Readings from Last 4 Encounters:   05/24/22 63.5 kg (140 lb)   03/04/22 61.2 kg (135 lb)   10/01/21 61.2 kg (135 lb)   04/07/20 71.2 kg (157 lb)     Impaired fasting glucose Follow-up      Patient is checking blood sugars: not at all    Diabetic concerns: None     Symptoms of hypoglycemia (low blood sugar): none     Paresthesias (numbness or burning in feet) or sores: No      Lab Results   Component Value Date    A1C 5.4 11/19/2019    A1C 5.5 10/03/2017               Healthy Habits:     In general, how would you rate your overall health?  Good    Frequency of exercise:  2-3 days/week    Duration of exercise:  Other    Do you usually eat at least 4 servings of fruit and vegetables a day, include whole grains    & fiber and avoid regularly eating high fat or \"junk\" foods?  Yes    Taking medications regularly:  No    Barriers to taking medications:  Problems remembering to take them    Medication side effects:  Not applicable    Ability to successfully perform activities of daily living:  No assistance needed    Home Safety:  No safety concerns identified    Hearing Impairment:  Difficulty following a conversation in a noisy restaurant or crowded room, feel that people are mumbling or not speaking clearly, need to ask people to speak up or repeat themselves and difficulty understanding soft or whispered speech    In the past 6 months, have you been bothered by leaking of urine?  No    In general, how would you rate your overall mental or emotional " health?  Good      PHQ-2 Total Score: 2    Additional concerns today:  Yes    Do you feel safe in your environment? Yes    Have you ever done Advance Care Planning? (For example, a Health Directive, POLST, or a discussion with a medical provider or your loved ones about your wishes): Yes, advance care planning is on file.       Fall risk  Unable to complete due to virtual visit; need for additional assessment in future face-to-face visit  click delete button to remove this line now  Cognitive Screening Unable to complete due to virtual visit; need for additional assessment in future face-to-face visit        Reviewed and updated as needed this visit by clinical staff    Allergies  Meds  Problems               Reviewed and updated as needed this visit by Provider    Allergies  Meds  Problems              Social History     Tobacco Use     Smoking status: Former Smoker     Packs/day: 0.25     Years: 5.00     Pack years: 1.25     Types: Cigarettes     Quit date: 2000     Years since quittin.4     Smokeless tobacco: Never Used   Substance Use Topics     Alcohol use: Yes     Alcohol/week: 2.0 standard drinks     Comment: moderate       Alcohol Use 2022   Prescreen: >3 drinks/day or >7 drinks/week? No   Prescreen: >3 drinks/day or >7 drinks/week? -         Tingling in her arms    Current providers sharing in care for this patient include:   Patient Care Team:  Sandy Hargrove MD as PCP - General (Family Practice)  Sandy Hargrove MD as Assigned PCP    The following health maintenance items are reviewed in Epic and correct as of today:  Health Maintenance Due   Topic Date Due     DEXA  Never done     ZOSTER IMMUNIZATION (1 of 2) Never done     LIPID  2020     BMP  2020     DTAP/TDAP/TD IMMUNIZATION (2 - Td or Tdap) 2020     A1C  2020     MICROALBUMIN  2020     COLORECTAL CANCER SCREENING  2021     COVID-19 Vaccine (4 - Booster for Moderna series)  2022     BP Readings from Last 3 Encounters:   22 (!) 147/91   22 137/81   10/01/21 126/70    Wt Readings from Last 3 Encounters:   22 63.5 kg (140 lb)   22 61.2 kg (135 lb)   10/01/21 61.2 kg (135 lb)                  Patient Active Problem List   Diagnosis     Other specified genital prolapse(618.89)     Skin lesion of chest wall     Skin lesion of right arm     Advance Care Planning     Hypercholesterolemia     Hyperlipidemia LDL goal <130     Overweight (BMI 25.0-29.9)     Environmental allergies     Intraductal carcinoma, noninfiltrating     Impaired fasting blood sugar     Vitamin D deficiency     Hearing loss in right ear     Pelvic relaxation     Family history of colon cancer     Skin lesion of left arm     Chronic fatigue     Need for hepatitis C screening test     Hx of colonic polyps     Hypertropia     Intermittent esotropia, alternating     Personal history of malignant neoplasm of breast     Superior oblique palsy     HIRAM (obstructive sleep apnea)     Hypertension goal BP (blood pressure) < 150/90     Past Surgical History:   Procedure Laterality Date     HYSTERECTOMY TOTAL ABDOMINAL, BILATERAL SALPINGO-OOPHORECTOMY, COMBINED  2004    lumpectomy left breast - CA     LUMPECTOMY BREAST  1996     SURGICAL HISTORY OF -       hyperplastic colonic polyps - benign       Social History     Tobacco Use     Smoking status: Former Smoker     Packs/day: 0.25     Years: 5.00     Pack years: 1.25     Types: Cigarettes     Quit date: 2000     Years since quittin.4     Smokeless tobacco: Never Used   Substance Use Topics     Alcohol use: Yes     Alcohol/week: 2.0 standard drinks     Comment: moderate     Family History   Problem Relation Age of Onset     Hypertension Mother      Breast Cancer Mother         12 years ago     Depression Mother      Psychotic Disorder Mother      Diabetes Father      Hypertension Father      Alcohol/Drug Father          Allergies    Allergen Reactions     No Known Drug Allergies        Review of Systems   Constitutional: Negative for chills and fever.   HENT: Positive for hearing loss. Negative for congestion, ear pain and sore throat.    Eyes: Positive for visual disturbance. Negative for pain.   Respiratory: Negative for cough and shortness of breath.    Cardiovascular: Negative for chest pain, palpitations and peripheral edema.   Gastrointestinal: Negative for abdominal pain, constipation, diarrhea, heartburn, hematochezia and nausea.   Breasts:  Negative for tenderness, breast mass and discharge.   Genitourinary: Negative for dysuria, frequency, genital sores, hematuria, pelvic pain, urgency, vaginal bleeding and vaginal discharge.   Musculoskeletal: Negative for arthralgias, joint swelling and myalgias.   Skin: Negative for rash.   Neurological: Negative for dizziness, weakness, headaches and paresthesias.   Psychiatric/Behavioral: Negative for mood changes. The patient is not nervous/anxious.      OBJECTIVE:   BP (!) 147/91   Wt 63.5 kg (140 lb)   LMP  (LMP Unknown)   BMI 27.34 kg/m   Estimated body mass index is 27.34 kg/m  as calculated from the following:    Height as of 10/1/21: 1.524 m (5').    Weight as of this encounter: 63.5 kg (140 lb).  Physical Exam  GENERAL APPEARANCE: healthy, alert and no distress  EYES: Eyes grossly normal to inspection, PERRL and conjunctivae and sclerae normal  HENT: ear canals and TM's normal, nose and mouth without ulcers or lesions, oropharynx clear and oral mucous membranes moist  NECK: no adenopathy, no asymmetry, masses, or scars and thyroid normal to palpation  RESP: lungs clear to auscultation - no rales, rhonchi or wheezes  CV: regular rate and rhythm, normal S1 S2, no S3 or S4, no murmur, click or rub, no peripheral edema and peripheral pulses strong  ABDOMEN: soft, nontender, no hepatosplenomegaly, no masses and bowel sounds normal  MS: no musculoskeletal defects are noted and gait is age  appropriate without ataxia  SKIN: no suspicious lesions or rashes  NEURO: Normal strength and tone, sensory exam grossly normal, mentation intact and speech normal  PSYCH: mentation appears normal and affect normal/bright      ASSESSMENT / PLAN:     (Z00.00) Encounter for Medicare annual wellness exam  routine    (R20.0,  R20.2) Numbness and tingling of upper extremity  (primary encounter diagnosis)  New problem, musculoskeletal vs disc disease  Plan: Physical Therapy Referral            (I10) Hypertension goal BP (blood pressure) < 150/90  Comment:   BP Readings from Last 3 Encounters:   05/24/22 (!) 147/91   03/04/22 137/81   10/01/21 126/70    not at goal, start amlodipine as below  Plan: amLODIPine (NORVASC) 5 MG tablet          (R53.82) Chronic fatigue  Comment: I will rule out thyroid disease, anemia, vitamin D deficiency.  I will follow up on abnormal labs as indicated.  If no medical cause determined, will recommend lifestyle changes (more exercise, weight loss) and consider an antidepressant.   Plan: TSH with free T4 reflex          (Z12.11) Screen for colon cancer  Comment: hx of polyps, due every 5 years  Plan: Adult Gastro Ref - Procedure Only          Patient has been advised of split billing requirements and indicates understanding: Yes    COUNSELING:  Reviewed preventive health counseling, as reflected in patient instructions    Estimated body mass index is 27.34 kg/m  as calculated from the following:    Height as of 10/1/21: 1.524 m (5').    Weight as of this encounter: 63.5 kg (140 lb).        She reports that she quit smoking about 22 years ago. Her smoking use included cigarettes. She has a 1.25 pack-year smoking history. She has never used smokeless tobacco.      Appropriate preventive services were discussed with this patient, including applicable screening as appropriate for cardiovascular disease, diabetes, osteopenia/osteoporosis, and glaucoma.  As appropriate for age/gender, discussed  screening for colorectal cancer, prostate cancer, breast cancer, and cervical cancer. Checklist reviewing preventive services available has been given to the patient.    Reviewed patients plan of care and provided an AVS. The Intermediate Care Plan ( asthma action plan, low back pain action plan, and migraine action plan) for Teetee meets the Care Plan requirement. This Care Plan has been established and reviewed with the Patient.      Visit end time: 9:54 AM  Amwell      Counseling Resources:  ATP IV Guidelines  Pooled Cohorts Equation Calculator  Breast Cancer Risk Calculator  Breast Cancer: Medication to Reduce Risk  FRAX Risk Assessment  ICSI Preventive Guidelines  Dietary Guidelines for Americans, 2010  USDA's MyPlate  ASA Prophylaxis  Lung CA Screening    Sandy Hargrove MD  [facility]

## 2022-05-24 NOTE — PATIENT INSTRUCTIONS
[facility]  Physical therapy to get you back in the game of life   The [facility], a service of [facility], provides orthopedic and sports physical therapy at over 30 [facility] locations. In addtion to physical therapy, [facility] offers chiropractic and athletic training services.   Appointments [phone]      Shingles vaccine  I strongly recommend getting the shingles vaccine (Shingrix) if you are over age 50, but please check with your insurance to see how much you might have to pay for this vaccine! If you are on most insurance plans including Medicare, it's covered if you get it at a pharmacy but not in a clinic.    This shot will prevent shingles, a painful skin rash that you are at risk for getting if you have ever had chicken pox. Sometimes the pain will last the rest of your life, even after the rash has healed, and there is not much that we can do to relieve the pain. The vaccine is 98% effective at preventing shingles.    Please consider getting this vaccine! You'll need #2 vaccines 2-4 months apart to be protected.     Patient Education   Personalized Prevention Plan  You are due for the preventive services outlined below.  Your care team is available to assist you in scheduling these services.  If you have already completed any of these items, please share that information with your care team to update in your medical record.  Health Maintenance Due   Topic Date Due    Osteoporosis Screening  Never done    Zoster (Shingles) Vaccine (1 of 2) Never done    Cholesterol Lab  02/19/2020    Basic Metabolic Panel  04/04/2020    Diptheria Tetanus Pertussis (DTAP/TDAP/TD) Vaccine (2 - Td or Tdap) 04/22/2020    A1C Lab  11/19/2020    Kidney Microalbumin Urine Test  11/19/2020    Colorectal Cancer Screening  07/28/2021    COVID-19 Vaccine (4 - Booster for Moderna series) 03/02/2022     Preventive Health Recommendations    See your health care provider every  year to  Review health changes.   Discuss preventive care.    Review your medicines if your doctor has prescribed any.  You no longer need a yearly Pap test unless you've had an abnormal Pap test in the past 10 years. If you have vaginal symptoms, such as bleeding or discharge, be sure to talk with your provider about a Pap test.  Every 1 to 2 years, have a mammogram.  If you are over 69, talk with your health care provider about whether or not you want to continue having screening mammograms.  Every 10 years, have a colonoscopy. Or, have a yearly FIT test (stool test). These exams will check for colon cancer.   Have a cholesterol test every 5 years, or more often if your doctor advises it.   Have a diabetes test (fasting glucose) every three years. If you are at risk for diabetes, you should have this test more often.   At age 65, have a bone density scan (DEXA) to check for osteoporosis (brittle bone disease).    Shots:  Get a flu shot each year.  Get a tetanus shot every 10 years.  Talk to your doctor about your pneumonia vaccines. There are now two you should receive - Pneumovax (PPSV 23) and Prevnar (PCV 13).  Talk to your pharmacist about the shingles vaccine.  Talk to your doctor about the hepatitis B vaccine.    Nutrition:   Eat at least 5 servings of fruits and vegetables each day.  Eat whole-grain bread, whole-wheat pasta and brown rice instead of white grains and rice.  Get adequate Calcium and Vitamin D.     Lifestyle  Exercise at least 150 minutes a week (30 minutes a day, 5 days a week). This will help you control your weight and prevent disease.  Limit alcohol to one drink per day.  No smoking.   Wear sunscreen to prevent skin cancer.   See your dentist twice a year for an exam and cleaning.  See your eye doctor every 1 to 2 years to screen for conditions such as glaucoma, macular degeneration and cataracts.

## 2022-05-25 ENCOUNTER — LAB (OUTPATIENT)
Dept: LAB | Facility: CLINIC | Age: 72
End: 2022-05-25
Payer: COMMERCIAL

## 2022-05-25 DIAGNOSIS — R73.01 IMPAIRED FASTING BLOOD SUGAR: ICD-10-CM

## 2022-05-25 DIAGNOSIS — R53.82 CHRONIC FATIGUE: ICD-10-CM

## 2022-05-25 DIAGNOSIS — I10 HYPERTENSION GOAL BP (BLOOD PRESSURE) < 150/90: ICD-10-CM

## 2022-05-25 DIAGNOSIS — E55.9 VITAMIN D DEFICIENCY: ICD-10-CM

## 2022-05-25 DIAGNOSIS — E78.5 HYPERLIPIDEMIA LDL GOAL <130: ICD-10-CM

## 2022-05-25 LAB
ALBUMIN SERPL-MCNC: 3.7 G/DL (ref 3.4–5)
ALP SERPL-CCNC: 106 U/L (ref 40–150)
ALT SERPL W P-5'-P-CCNC: 30 U/L (ref 0–50)
ANION GAP SERPL CALCULATED.3IONS-SCNC: 7 MMOL/L (ref 3–14)
AST SERPL W P-5'-P-CCNC: 21 U/L (ref 0–45)
BASOPHILS # BLD AUTO: 0 10E3/UL (ref 0–0.2)
BASOPHILS NFR BLD AUTO: 1 %
BILIRUB SERPL-MCNC: 0.9 MG/DL (ref 0.2–1.3)
BUN SERPL-MCNC: 9 MG/DL (ref 7–30)
CALCIUM SERPL-MCNC: 8.8 MG/DL (ref 8.5–10.1)
CHLORIDE BLD-SCNC: 111 MMOL/L (ref 94–109)
CHOLEST SERPL-MCNC: 253 MG/DL
CO2 SERPL-SCNC: 26 MMOL/L (ref 20–32)
CREAT SERPL-MCNC: 0.61 MG/DL (ref 0.52–1.04)
CREAT UR-MCNC: 47 MG/DL
EOSINOPHIL # BLD AUTO: 0.1 10E3/UL (ref 0–0.7)
EOSINOPHIL NFR BLD AUTO: 2 %
ERYTHROCYTE [DISTWIDTH] IN BLOOD BY AUTOMATED COUNT: 13.2 % (ref 10–15)
FASTING STATUS PATIENT QL REPORTED: ABNORMAL
GFR SERPL CREATININE-BSD FRML MDRD: >90 ML/MIN/1.73M2
GLUCOSE BLD-MCNC: 107 MG/DL (ref 70–99)
HBA1C MFR BLD: 5.6 % (ref 0–5.6)
HCT VFR BLD AUTO: 43 % (ref 35–47)
HDLC SERPL-MCNC: 47 MG/DL
HGB BLD-MCNC: 14.4 G/DL (ref 11.7–15.7)
IMM GRANULOCYTES # BLD: 0 10E3/UL
IMM GRANULOCYTES NFR BLD: 0 %
LDLC SERPL CALC-MCNC: 184 MG/DL
LYMPHOCYTES # BLD AUTO: 1.5 10E3/UL (ref 0.8–5.3)
LYMPHOCYTES NFR BLD AUTO: 32 %
MCH RBC QN AUTO: 29.8 PG (ref 26.5–33)
MCHC RBC AUTO-ENTMCNC: 33.5 G/DL (ref 31.5–36.5)
MCV RBC AUTO: 89 FL (ref 78–100)
MICROALBUMIN UR-MCNC: 12 MG/L
MICROALBUMIN/CREAT UR: 25.53 MG/G CR (ref 0–25)
MONOCYTES # BLD AUTO: 0.4 10E3/UL (ref 0–1.3)
MONOCYTES NFR BLD AUTO: 8 %
NEUTROPHILS # BLD AUTO: 2.7 10E3/UL (ref 1.6–8.3)
NEUTROPHILS NFR BLD AUTO: 57 %
NONHDLC SERPL-MCNC: 206 MG/DL
PLATELET # BLD AUTO: 214 10E3/UL (ref 150–450)
POTASSIUM BLD-SCNC: 3.5 MMOL/L (ref 3.4–5.3)
PROT SERPL-MCNC: 7.1 G/DL (ref 6.8–8.8)
RBC # BLD AUTO: 4.84 10E6/UL (ref 3.8–5.2)
SODIUM SERPL-SCNC: 144 MMOL/L (ref 133–144)
TRIGL SERPL-MCNC: 112 MG/DL
TSH SERPL DL<=0.005 MIU/L-ACNC: 1.28 MU/L (ref 0.4–4)
WBC # BLD AUTO: 4.7 10E3/UL (ref 4–11)

## 2022-05-25 PROCEDURE — 84443 ASSAY THYROID STIM HORMONE: CPT

## 2022-05-25 PROCEDURE — 82043 UR ALBUMIN QUANTITATIVE: CPT

## 2022-05-25 PROCEDURE — 36415 COLL VENOUS BLD VENIPUNCTURE: CPT

## 2022-05-25 PROCEDURE — 85025 COMPLETE CBC W/AUTO DIFF WBC: CPT

## 2022-05-25 PROCEDURE — 80061 LIPID PANEL: CPT

## 2022-05-25 PROCEDURE — 82306 VITAMIN D 25 HYDROXY: CPT

## 2022-05-25 PROCEDURE — 80053 COMPREHEN METABOLIC PANEL: CPT

## 2022-05-25 PROCEDURE — 83036 HEMOGLOBIN GLYCOSYLATED A1C: CPT

## 2022-05-26 DIAGNOSIS — E55.9 VITAMIN D DEFICIENCY: Primary | ICD-10-CM

## 2022-05-26 LAB — DEPRECATED CALCIDIOL+CALCIFEROL SERPL-MC: 15 UG/L (ref 20–75)

## 2022-05-26 RX ORDER — ERGOCALCIFEROL 1.25 MG/1
50000 CAPSULE, LIQUID FILLED ORAL WEEKLY
Qty: 12 CAPSULE | Refills: 0 | Status: SHIPPED | OUTPATIENT
Start: 2022-05-26 | End: 2022-10-20

## 2022-05-26 NOTE — RESULT ENCOUNTER NOTE
Thank you for getting labs done.     Your vitamin D level is low.    As you may know, vitamin D deficiency is associated with many medical problems including osteoporosis, muscles aches and pains, weakness and falls.  Maintaining adequate levels is very important for good health.  During winter months (October through March), people in northern climates are not able to synthesize vitamin D from sunlight and therefore have higher rates of deficiency due to inadquate oral intake. Vitamin D deficiency is very common in [place]!    I recommend taking a high dose supplement for 3 months, 50,000 units once per week.  You could consider rechecking your level when you have finished this course. I will send a prescription to your pharmacy ([facility] [place]).     After you are done with the high dose,  I recommend taking a daily supplement of 2000 units daily.      Your blood count is normal. The testing of your blood sugar, kidney function, liver function and electrolytes was normal. Your microalbumen is slightly elevated. This means you have some protein in the urine. It indicates that your kidneys are being affected by hypertension. Keeping blood pressure and blood fats low is the best treatment in order to keep this  stable.      Your lab test to check for diabetes, HgA1C, also called glycosylated hemoglobin, which measures the level of sugar in your blood over the past few months, is normal which is great! Congratulations, you don't have diabetes!       Your total cholesterol and LDL cholesterol are high and your HDL is too low. Your triglycerides are right on target!    Desired or goal levels are:  CHOLESTEROL: Desirable is less than 200.  HDL (Good Cholesterol): Desirable is greater than 40 in men and greater than 50 in women.  LDL (Bad Cholesterol): Desirable is less than 130.  TRIGLYCERIDES: Desirable is less than 150.    The red rice yeast is probably helping a little bit, but your risk of heart disease or  stroke is still high based on your cholesterol. I do recommend that you consider starting a cholesterol lowering medication called a statin to reduce these risks. Please schedule an appointment to discuss this if you would like to start a medication!    As you may know, abnormal cholesterol is one factor that increases your risk for heart disease and stroke. You can improve your cholesterol by controlling the amount and type of fat you eat and by increasing your daily activity level.    Here are some ways to improve your nutrition (adapted from the American Academy of Family Practice handout):  Eat less fat (especially butter, Crisco and other saturated fats)  Buy lean cuts of meat; reduce your portions of red meat or substitute poultry or fish  Use skim milk and low-fat dairy products  Eat no more than 4 egg yolks per week  Avoid fried or fast foods that are high in fat  Eat more fruits and vegetables    Also consider starting or increasing your aerobic activity; this is the best way to improve HDL (good) cholesterol.     If you have any questions, please contact the clinic or schedule an appointment with me, thank you!    Sincerely,    Sandy Hargrove MD

## 2022-05-31 ENCOUNTER — VIRTUAL VISIT (OUTPATIENT)
Dept: FAMILY MEDICINE | Facility: CLINIC | Age: 72
End: 2022-05-31
Payer: COMMERCIAL

## 2022-05-31 DIAGNOSIS — I10 HYPERTENSION GOAL BP (BLOOD PRESSURE) < 150/90: Primary | ICD-10-CM

## 2022-05-31 DIAGNOSIS — E55.9 VITAMIN D DEFICIENCY: ICD-10-CM

## 2022-05-31 DIAGNOSIS — E78.00 HYPERCHOLESTEROLEMIA: ICD-10-CM

## 2022-05-31 DIAGNOSIS — E78.5 HYPERLIPIDEMIA LDL GOAL <130: ICD-10-CM

## 2022-05-31 PROCEDURE — 99213 OFFICE O/P EST LOW 20 MIN: CPT | Mod: 95 | Performed by: FAMILY MEDICINE

## 2022-05-31 RX ORDER — ERGOCALCIFEROL 1.25 MG/1
50000 CAPSULE, LIQUID FILLED ORAL WEEKLY
Qty: 12 CAPSULE | Refills: 0 | Status: SHIPPED | OUTPATIENT
Start: 2022-05-31 | End: 2022-10-20

## 2022-05-31 RX ORDER — AMLODIPINE BESYLATE 5 MG/1
5 TABLET ORAL AT BEDTIME
Qty: 30 TABLET | Refills: 3 | Status: SHIPPED | OUTPATIENT
Start: 2022-05-31 | End: 2022-10-20

## 2022-05-31 NOTE — PATIENT INSTRUCTIONS
Thank you for getting labs done.      Your vitamin D level is low.     As you may know, vitamin D deficiency is associated with many medical problems including osteoporosis, muscles aches and pains, weakness and falls.  Maintaining adequate levels is very important for good health.  During winter months (October through March), people in northern climates are not able to synthesize vitamin D from sunlight and therefore have higher rates of deficiency due to inadquate oral intake. Vitamin D deficiency is very common in [place]!     I recommend taking a high dose supplement for 3 months, 50,000 units once per week.  You could consider rechecking your level when you have finished this course. I will send a prescription to your pharmacy ([facility] [place]).      After you are done with the high dose,  I recommend taking a daily supplement of 2000 units daily.       Your blood count is normal. The testing of your blood sugar, kidney function, liver function and electrolytes was normal. Your microalbumen is slightly elevated. This means you have some protein in the urine. It indicates that your kidneys are being affected by hypertension. Keeping blood pressure and blood fats low is the best treatment in order to keep this  stable.       Your lab test to check for diabetes, HgA1C, also called glycosylated hemoglobin, which measures the level of sugar in your blood over the past few months, is normal which is great! Congratulations, you don't have diabetes!        Your total cholesterol and LDL cholesterol are high and your HDL is too low. Your triglycerides are right on target!     Desired or goal levels are:  CHOLESTEROL: Desirable is less than 200.  HDL (Good Cholesterol): Desirable is greater than 40 in men and greater than 50 in women.  LDL (Bad Cholesterol): Desirable is less than 130.  TRIGLYCERIDES: Desirable is less than 150.     The red rice yeast is probably helping a little bit, but your risk of heart  disease or stroke is still high based on your cholesterol. I do recommend that you consider starting a cholesterol lowering medication called a statin to reduce these risks. Please schedule an appointment to discuss this if you would like to start a medication!     As you may know, abnormal cholesterol is one factor that increases your risk for heart disease and stroke. You can improve your cholesterol by controlling the amount and type of fat you eat and by increasing your daily activity level.     Here are some ways to improve your nutrition (adapted from the American Academy of Family Practice handout):  Eat less fat (especially butter, Crisco and other saturated fats)  Buy lean cuts of meat; reduce your portions of red meat or substitute poultry or fish  Use skim milk and low-fat dairy products  Eat no more than 4 egg yolks per week  Avoid fried or fast foods that are high in fat  Eat more fruits and vegetables     Also consider starting or increasing your aerobic activity; this is the best way to improve HDL (good) cholesterol.      If you have any questions, please contact the clinic or schedule an appointment with me, thank you!     Sincerely,     Sandy Hargrove MD

## 2022-05-31 NOTE — PROGRESS NOTES
Teetee is a 71 year old who is being evaluated via a billable video visit.      Video Start Time: 2:08 PM    Assessment & Plan     (I10) Hypertension goal BP (blood pressure) < 150/90  (primary encounter diagnosis)  Comment: she's tried to improve lifestyle diet and exercise, but blood pressure still high, will start amlodipine    (E55.9) Vitamin D deficiency  Comment: still low, recommend starting high dose replacement for 12 weeks  Plan: vitamin D2 (ERGOCALCIFEROL) 26207 units (1250         mcg) capsule            (E78.5) Hyperlipidemia LDL goal <130  Comment:   LDL Cholesterol Calculated   Date Value Ref Range Status   05/25/2022 184 (H) <=100 mg/dL Final   11/19/2019 164 (H) <100 mg/dL Final     Comment:     Above desirable:  100-129 mg/dl  Borderline High:  130-159 mg/dL  High:             160-189 mg/dL  Very high:       >189 mg/dl      not at goal, doubling red rice year dose and will retest lipids in 2-3 months  Plan: Lipid panel reflex to direct LDL Fasting            (E78.00) Hypercholesterolemia  Comment: see above  Plan: Lipid panel reflex to direct LDL Fasting             Return in about 4 weeks (around 6/28/2022) for Hypertension follow up.    Sandy Hargrove MD  [facility]    Subjective   Teetee is a 71 year old who presents for the following health issues     HPI     Hyperlipidemia Follow-Up      Are you regularly taking any medication or supplement to lower your cholesterol?   Yes- red rice yeast, 1 per day (bottle says to take twice per day)    Are you having muscle aches or other side effects that you think could be caused by your cholesterol lowering medication?  No    Hypertension Follow-up      Do you check your blood pressure regularly outside of the clinic? Yes     Are you following a low salt diet? Yes    Are your blood pressures ever more than 140 on the top number (systolic) OR more   than 90 on the bottom number (diastolic), for example 140/90? No    The  10-year ASCVD risk score (Sanjuanita YUEN Jr., et al., 2013) is: 17.5%    Values used to calculate the score:      Age: 71 years      Sex: Female      Is Non- : No      Diabetic: No      Tobacco smoker: No      Systolic Blood Pressure: 147 mmHg      Is BP treated: No      HDL Cholesterol: 47 mg/dL      Total Cholesterol: 253 mg/dL     Review of Systems   Constitutional, HEENT, cardiovascular, pulmonary, GI, , musculoskeletal, neuro, skin, endocrine and psych systems are negative, except as otherwise noted.      Objective           Vitals:  No vitals were obtained today due to virtual visit.  BP Readings from Last 3 Encounters:   05/24/22 (!) 147/91   03/04/22 137/81   10/01/21 126/70        Physical Exam   GENERAL: Healthy, alert and no distress  EYES: Eyes grossly normal to inspection.  No discharge or erythema, or obvious scleral/conjunctival abnormalities.  RESP: No audible wheeze, cough, or visible cyanosis.  No visible retractions or increased work of breathing.    SKIN: Visible skin clear. No significant rash, abnormal pigmentation or lesions.  NEURO: Cranial nerves grossly intact.  Mentation and speech appropriate for age.  PSYCH: Mentation appears normal, affect normal/bright, judgement and insight intact, normal speech and appearance well-groomed.          Video-Visit Details    Type of service:  Video Visit    Video End Time:2:27 PM    Originating Location (pt. Location): Home    Distant Location (provider location):  [facility]     Platform used for Video Visit: [employer]

## 2022-08-22 ENCOUNTER — TELEPHONE (OUTPATIENT)
Dept: FAMILY MEDICINE | Facility: CLINIC | Age: 72
End: 2022-08-22

## 2022-08-22 NOTE — TELEPHONE ENCOUNTER
Emilia with [facility] calling     Pt is scheduled for an appt - needs referral sent.     There  Is a PT referral from May 2022 that can be sent. Checking with Jing Stephens that pt can be referred here.      Fax referral to: [phone]    AYDEN FarleyN RN  [facility]

## 2022-08-22 NOTE — TELEPHONE ENCOUNTER
Pt's insurance is [facility] Medicare Advantage Plan and [facility] (Tax ID [id number]) clinic ended their contract with [facility] Medicare Advantage plan on 8/4/2021.    Spoke with Pt and she knows [facility] is out of network. Pt still wants to be seen there and will pay the out of network cost.     Please fax [phone] the 5/24/22 physical therapy order to [facility].    MELCHOR [facility] Referral Rep

## 2022-08-23 NOTE — TELEPHONE ENCOUNTER
I faxed referral to [facility]- to # below.    I called and left message letting her know requested referral was faxed.     AYDEN FarleyN RN  [facility]

## 2022-09-28 ENCOUNTER — MYC MEDICAL ADVICE (OUTPATIENT)
Dept: FAMILY MEDICINE | Facility: CLINIC | Age: 72
End: 2022-09-28

## 2022-10-05 ENCOUNTER — TELEPHONE (OUTPATIENT)
Dept: GASTROENTEROLOGY | Facility: CLINIC | Age: 72
End: 2022-10-05

## 2022-10-05 ENCOUNTER — HOSPITAL ENCOUNTER (OUTPATIENT)
Facility: AMBULATORY SURGERY CENTER | Age: 72
End: 2022-10-05
Attending: INTERNAL MEDICINE
Payer: COMMERCIAL

## 2022-10-05 NOTE — TELEPHONE ENCOUNTER
Screening Questions  BLUE  KIND OF PREP RED  LOCATION [review exclusion criteria] GREEN  SEDATION TYPE        Y Are you active on mychart?    Sandy Hargrove MD     Ordering/Referring Provider?        BCBS/MEDICARE What type of coverage do you have?      N Have you had a positive covid test in the last 90 days?     27.3 1. BMI  [BMI 40+ - review exclusion criteria]    Y  2. Are you able to give consent for your medical care? [IF NO,RN REVIEW]        N  3. Are you taking any prescription pain medications on a routine schedule?      NA  3a. EXTENDED PREP What kind of prescription?     N 4. Do you have any chemical dependencies such as alcohol, street drugs, or methadone?    N 5. Do you have any history of post-traumatic stress syndrome, severe anxiety or history of psychosis?      **If yes 3- 5 , please schedule with MAC sedation.**          IF YES TO ANY 6 - 10 - HOSPITAL SETTING ONLY.     N 6.   Do you need assistance transferring?     N 7.   Have you had a heart or lung transplant?    N 8.   Are you currently on dialysis?   N 9.   Do you use daily home oxygen?   N 10. Do you take nitroglycerin?   10a. NA If yes, how often?     11. [FEMALES]  N Are you currently pregnant?    11a. NA If yes, how many weeks? [ Greater than 12 weeks, OR NEEDED]    N 12. Do you have Pulmonary Hypertension? *NEED PAC APPT AT [facility]*     N 13. [review exclusion criteria]  Do you have any implantable devices in your body (pacemaker, defib, LVAD)?    N 14. In the past 6 months, have you had any heart related issues including cardiomyopathy or heart attack?     14a. NA If yes, did it require cardiac stenting if so when?     N 15. Have you had a stroke or Transient ischemic attack (TIA - aka  mini stroke ) within 6 months?      N 16. Do you have mod to severe Obstructive Sleep Apnea?  [Hospital only - Ok at [facility]]    N 17. Do you have SEVERE AND UNCONTROLLED asthma? *NEED PAC APPT AT [facility]*     N 18. Are you currently taking any  "blood thinners?     18a. If yes, inform patient to \"follow up w/ ordering provider for bridging instructions.\"    N 19. Do you take the medication Phentermine?    19a. If yes, \"Hold for 7 days before procedure.  Please consult your prescribing provider if you have questions about holding this medication.\"     N  20. Do you have chronic kidney disease?      N  21. Do you have a diagnosis of diabetes?     N  22. On a regular basis do you go 3-5 days between bowel movements?      23. Preferred LOCAL Pharmacy for Pre Prescription    [ LIST ONLY ONE PHARMACY]        [facility] - [place], [place] - [address]    - CLOSING REMINDERS -    Informed patient they will need an adult    Cannot take any type of public or medical transportation alone    Conscious Sedation- Needs  for 6 hours after the procedure       MAC/General-Needs  for 24 hours after procedure    Pre-Procedure Covid test to be completed [[facility] PCR Testing Required]    Confirmed Nurse will call to complete assessment       - SCHEDULING DETAILS -    SINDY  Surgeon   11/14  Date of Procedure  Lower Endoscopy [Colonoscopy]  Type of Procedure Scheduled  [facility] PREP-If you answer yes to questions #8, #20, #21Which Colonoscopy Prep was Sent?     MODERATE Sedation Type     N PAC / Pre-op Required         Additional comments:            "

## 2022-10-06 ENCOUNTER — TELEPHONE (OUTPATIENT)
Dept: GASTROENTEROLOGY | Facility: CLINIC | Age: 72
End: 2022-10-06

## 2022-10-06 NOTE — TELEPHONE ENCOUNTER
Outbound Call made to: Teetee Dalal     Procedure: COLONSOCOPY     Date, Location, and Surgeon of Procedure Cancelled:   11/14/2022 - NIA - SINDY       Reason for call (please be detailed, any staff messages or encounters to note?):     SINDY NOT CREDENTIALED AT [facility] YET.          Rescheduled:     YES      If rescheduled:    Date: 12/14/2022 RUBI/     Location: [facility]    Note any change or update to original order/sedation: N/A

## 2022-10-11 ENCOUNTER — VIRTUAL VISIT (OUTPATIENT)
Dept: URGENT CARE | Facility: CLINIC | Age: 72
End: 2022-10-11
Payer: COMMERCIAL

## 2022-10-11 DIAGNOSIS — R10.84 ABDOMINAL PAIN, GENERALIZED: Primary | ICD-10-CM

## 2022-10-11 PROCEDURE — 99207 PR NO BILLABLE SERVICE THIS VISIT: CPT

## 2022-10-11 ASSESSMENT — ENCOUNTER SYMPTOMS
CONSTITUTIONAL NEGATIVE: 1
ABDOMINAL DISTENTION: 0
HEMATOCHEZIA: 0
CONSTIPATION: 1
RECTAL PAIN: 0
HEARTBURN: 0
NAUSEA: 0
DIARRHEA: 0
VOMITING: 0
ABDOMINAL PAIN: 1

## 2022-10-11 NOTE — PROGRESS NOTES
Teetee is a 71 year old who is being evaluated via a billable telephone  Visit since video did not work          Assessment & Plan     Abdominal pain, generalized  Ongoing for past few weeks and constant. Recommend being seen in person for workup.         15 minutes spent on the date of the encounter doing chart review, patient visit and documentation        See Patient Instructions    Return in about 2 weeks (around 10/25/2022), or if symptoms worsen or fail to improve.    No name on file  [facility] VIRTUAL URGENT CARE    Subjective   Teetee is a 71 year old presenting for the following health issues:  No chief complaint on file.      HPI     Lower abd pain for past 1-2 weeks just above the pelvis. No nausea vomiting or diarrhea. No hx of IBD,diverticulitis  last colonoscopy 2016.   Was thinking she has constipation, but has had BM's every day. No blood, hematochezia, or black stools. Tried prunes and this made her stools looser but still had pain in the abdomen. Is currently experiencing mild pain just sitting talking on the phone.   Eats a diet rich in veggies.   Non UTI sx  Did not feel well yesterday.   No fevers         Review of Systems   Constitutional: Negative.    Gastrointestinal: Positive for abdominal pain and constipation. Negative for abdominal distention, diarrhea, heartburn, hematochezia, nausea, rectal pain and vomiting.   Genitourinary: Negative.             Objective           Vitals:  No vitals were obtained today due to virtual visit.    Physical Exam   GENERAL: Healthy, alert and no distress  RESP: No audible wheeze, cough.  No increased work of breathing with conversation.        Phone visit lasted 10 minutes

## 2022-10-15 ENCOUNTER — HEALTH MAINTENANCE LETTER (OUTPATIENT)
Age: 72
End: 2022-10-15

## 2022-10-20 ENCOUNTER — OFFICE VISIT (OUTPATIENT)
Dept: FAMILY MEDICINE | Facility: CLINIC | Age: 72
End: 2022-10-20
Payer: COMMERCIAL

## 2022-10-20 VITALS
DIASTOLIC BLOOD PRESSURE: 89 MMHG | RESPIRATION RATE: 18 BRPM | OXYGEN SATURATION: 97 % | TEMPERATURE: 97.9 F | WEIGHT: 137 LBS | BODY MASS INDEX: 26.76 KG/M2 | HEART RATE: 76 BPM | SYSTOLIC BLOOD PRESSURE: 144 MMHG

## 2022-10-20 DIAGNOSIS — N81.10 VAGINAL WALL PROLAPSE: ICD-10-CM

## 2022-10-20 DIAGNOSIS — I10 HYPERTENSION GOAL BP (BLOOD PRESSURE) < 150/90: ICD-10-CM

## 2022-10-20 DIAGNOSIS — K59.00 DIFFICULTY IN ABILITY TO DEFECATE: Primary | ICD-10-CM

## 2022-10-20 PROCEDURE — 99214 OFFICE O/P EST MOD 30 MIN: CPT | Performed by: NURSE PRACTITIONER

## 2022-10-20 NOTE — PROGRESS NOTES
"  Assessment & Plan     Difficulty in ability to defecate  Vaginal wall prolapse  I suspect some of her difficulty could be related to vaginal wall defect/prolapse.  We discussed adding fiber supplement to keep stools soft which would reduce straining.  She was also agreeable to PT referral for pelvic health and referral to OB/GYN for pelvic exam.  She is unsure if she would want surgery if it involved a sling, but would like to be presented with all options.   - Physical Therapy Referral; Future  - Ob/Gyn Referral; Future    Hypertension goal BP (blood pressure) < 150/90  Elevated today, had swelling from amlodipine, so she stopped.  Declining medication today.  States mindfulness exercises have helped in the past.       No follow-ups on file.    ULISES Jeter CNP  [facility]    Shalom Kingsley is a 72 year old, presenting for the following health issues:  Constipation      History of Present Illness       Reason for visit:  Cnstipation    She eats 4 or more servings of fruits and vegetables daily.She consumes 0 sweetened beverage(s) daily.She exercises with enough effort to increase her heart rate 10 to 19 minutes per day.  She exercises with enough effort to increase her heart rate 3 or less days per week. She is missing 2 dose(s) of medications per week.       Episodic bouts of digestion issues.  Going on for a couple years, tries to manage with diet.  Uses apple cider vinegar, which seems to work well.  Was able to lose 15lbs.     Still every once and while having constipation.  Most recent episode, would feel like needed to go, but couldn't.  Nervous to push hard because will get vaginal bulging.  Has used a vaginal sponge to hold in place.  Walking would help.  This went on for about 8 days.   Had a phone visit with .  They suggested x-ray, but it was better.  Also has a vaginal \"hernia\".      Review of Systems   Constitutional, HEENT, cardiovascular, pulmonary, gi and " gu systems are negative, except as otherwise noted.      Objective    BP (!) 144/89 (BP Location: Left arm, Patient Position: Sitting, Cuff Size: Adult Regular)   Pulse 76   Temp 97.9  F (36.6  C) (Temporal)   Resp 18   Wt 62.1 kg (137 lb)   LMP  (LMP Unknown)   SpO2 97%   BMI 26.76 kg/m    Body mass index is 26.76 kg/m .  Physical Exam   GENERAL: healthy, alert and no distress  EYES: Eyes grossly normal to inspection, PERRL and conjunctivae and sclerae normal  MS: no gross musculoskeletal defects noted, no edema

## 2022-12-07 ENCOUNTER — TELEPHONE (OUTPATIENT)
Dept: GASTROENTEROLOGY | Facility: CLINIC | Age: 72
End: 2022-12-07

## 2022-12-07 NOTE — TELEPHONE ENCOUNTER
"Caller:     Procedure: Colonoscopy    Date, Location, and Surgeon of Procedure Cancelled: 12/14/2022, Sultan ROBBY    Ordering Provider: VIOLA AVALOS    Reason for cancel (please be detailed, any staff messages or encounters to note?):   Anita Abreu RN  P Endoscopy Scheduling [facility] [place],     Upon review of this patients chart she has severe sleep apnea with an AHI >30.  She answered \"no\" on pre screening questions but there is sleep study report that says otherwise.  Hospital setting would be recommended for this patient.       Can someone reach out to patient to reschedule?           Rescheduled: No, LVM and sent MyChart with cancellation info.   "

## 2022-12-08 ENCOUNTER — TRANSFERRED RECORDS (OUTPATIENT)
Dept: HEALTH INFORMATION MANAGEMENT | Facility: CLINIC | Age: 72
End: 2022-12-08

## 2022-12-12 ENCOUNTER — TELEPHONE (OUTPATIENT)
Dept: GASTROENTEROLOGY | Facility: CLINIC | Age: 72
End: 2022-12-12

## 2022-12-12 NOTE — TELEPHONE ENCOUNTER
"Left VM and sent docBeathart message to reschedule.    Third attempt    Melania Downing  P Endoscopy Scheduling Pool  Replies will be sent to P Endoscopy Scheduling Pool  2nd attempt made, please call for 3rd attempt on 12/12           Previous Messages     ----- Message -----   From: Lalita Beasley   Sent: 12/9/2022  12:00 AM CST   To: Endoscopy Scheduling Pool   Subject: FW: Reschedule                                   1ST ATT to reschedule, LVM and sent MyChart. Try again 12/9.   ----- Message -----   From: Anita Abreu RN   Sent: 12/7/2022  12:28 PM CST   To: Endoscopy Scheduling Pool   Subject: Reschedule                                       Hello,     Upon review of this patients chart she has severe sleep apnea with an AHI >30.  She answered \"no\" on pre screening questions but there is sleep study report that says otherwise.  Hospital setting would be recommended for this patient.       Can someone reach out to patient to reschedule?     Thank you,   Catie HOWARD      "

## 2022-12-22 ENCOUNTER — OFFICE VISIT (OUTPATIENT)
Dept: OBGYN | Facility: CLINIC | Age: 72
End: 2022-12-22
Payer: COMMERCIAL

## 2022-12-22 VITALS — WEIGHT: 137 LBS | DIASTOLIC BLOOD PRESSURE: 84 MMHG | BODY MASS INDEX: 26.76 KG/M2 | SYSTOLIC BLOOD PRESSURE: 140 MMHG

## 2022-12-22 DIAGNOSIS — N99.3 PROLAPSE OF VAGINAL VAULT AFTER HYSTERECTOMY: Primary | ICD-10-CM

## 2022-12-22 PROCEDURE — 99203 OFFICE O/P NEW LOW 30 MIN: CPT | Performed by: OBSTETRICS & GYNECOLOGY

## 2022-12-26 NOTE — PROGRESS NOTES
CC:  prolapse  HPI:  Teetee Dalal is a 72 year old female No LMP recorded (lmp unknown). Patient has had a hysterectomy.  Had PILO/BSO in 2002 and about 7-8 years later felt like things were collapsing. Tried many different pessary's, but none would work. For last 10 years as been using sea sponges for most days and will remove at night, sterilize. This was working well until about 6 months ago and then prolapse seemed to worsen. Will get some cramping sometimes with bowel movements now and seems more painful.     No longer sexually active.    Also has known stress incontinence with jump/laughing.     Allergies: No known drug allergies    EXAM:  Blood pressure (!) 140/84, weight 62.1 kg (137 lb), not currently breastfeeding.  General - pleasant female in no acute distress.  Abdomen - soft, nontender, nondistended, no hepatosplenomegaly.  Pelvic - EG: adult female, BUS: within normal limits, Vagina: slightly atrophic, no discharge  With valsalva has grade 2 vaginal vault prolapse. Minimal cystocele, no rectocele. Feels like just vaginal vault with enterocele. 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 Discussed I no longer do these surgeries with vault suspension and would recc she also have possible bladder correction options as well. She would like to meet with urology to discuss and we did review 6 weeks of no heavy lifting after surgery. Given ACOG brochure on prolapse, diagrams reviewed.     Jazlyn Franco MD

## 2023-02-09 ENCOUNTER — PRE VISIT (OUTPATIENT)
Dept: UROLOGY | Facility: CLINIC | Age: 73
End: 2023-02-09
Payer: COMMERCIAL

## 2023-02-09 NOTE — TELEPHONE ENCOUNTER
Reason for visit: Prolapse of vaginal vault after hysterectomy     Relevant information: consult    Records/imaging/labs/orders: records available    Pt called: no need for a call    At Rooming: collect a urine/pvr, undress for an exam      Nia Camarillo CMA  2/9/2023  10:01 AM

## 2023-02-16 ENCOUNTER — PRE VISIT (OUTPATIENT)
Dept: UROLOGY | Facility: CLINIC | Age: 73
End: 2023-02-16

## 2023-02-16 ENCOUNTER — OFFICE VISIT (OUTPATIENT)
Dept: UROLOGY | Facility: CLINIC | Age: 73
End: 2023-02-16
Attending: OBSTETRICS & GYNECOLOGY
Payer: COMMERCIAL

## 2023-02-16 VITALS
DIASTOLIC BLOOD PRESSURE: 88 MMHG | HEART RATE: 76 BPM | HEIGHT: 60 IN | WEIGHT: 137 LBS | BODY MASS INDEX: 26.9 KG/M2 | SYSTOLIC BLOOD PRESSURE: 155 MMHG

## 2023-02-16 DIAGNOSIS — R15.2 FECAL URGENCY: ICD-10-CM

## 2023-02-16 DIAGNOSIS — R39.15 URINARY URGENCY: Primary | ICD-10-CM

## 2023-02-16 DIAGNOSIS — N99.3 PROLAPSE OF VAGINAL VAULT AFTER HYSTERECTOMY: ICD-10-CM

## 2023-02-16 PROCEDURE — 99203 OFFICE O/P NEW LOW 30 MIN: CPT | Mod: 25 | Performed by: UROLOGY

## 2023-02-16 PROCEDURE — 51798 US URINE CAPACITY MEASURE: CPT | Performed by: UROLOGY

## 2023-02-16 ASSESSMENT — PAIN SCALES - GENERAL: PAINLEVEL: NO PAIN (0)

## 2023-02-16 NOTE — PATIENT INSTRUCTIONS
Websites with free information:    [employer] patient website: www.voicesforpfd.org    Total Control Program: www.totalcontrolprogram.com    It was a pleasure meeting with you today.  Thank you for allowing me and my team the privilege of caring for you today.  YOU are the reason we are here, and I truly hope we provided you with the excellent service you deserve.  Please let us know if there is anything else we can do for you so that we can be sure you are leaving completely satisfied with your care experience.

## 2023-02-16 NOTE — PROGRESS NOTES
February 16, 2023    Referring Provider: Jazlyn Franco MD  [address] [place] [address]    Primary Care Provider: Mery Desai    Assessment & Plan     Prolapse of vaginal vault after hysterectomy  We discussed that pelvic organ prolapse is essentially a woman's hernia and that although bothersome, by itself is not life threatening.  We discussed that there are multiple options for treatment including observation, pessary, and surgery.  We further discussed that for surgery that tjere are reconstructive surgery and obliterative surgery    Ultimately at this time she needs to have her her colonoscopy first to make sure there is no GI pathology as this may impact the choice of surgical repair    Urinary urgency  Discussed this may or may not improve after prolapse repair    Fecal urgency  Needs to have colonoscopy    RTC after colonoscopy for further surgical discussion    20 minutes were spent on this day of the encounter in reviewing the EMR including Dr Heredia's notes, direct patient care, coordination of care and documentation    Heidy Robledo MD MPH  (she/her/hers)   of Urology  [facility]      HPI:  Teetee Dalal is a 72 year old female who presents for evaluation of her pelvic floor symptoms.  She noticed a vaginal bulge 8-10 years after her hysterectomy that she feels is now worsening. Previously tried multiple types of pessaries and experienced too much discomfort. Referred to PT previously but never went. Has used a vaginal sponge for at least 10 years that she reports has helped. Does not have urinary incontinence, leakage, dysuria, gross hematuria, or difficulty urinating and has UTI no more than 1 time per year. Nocturia 1-2x most nights. Intermittently experiences urinary and/or bowel urgency, but does not ever experience incontinence. She is mostly concerned about intermittent painful constipation occurring about 1x per month. She reports feeling  "abdominal cramping and difficulty having a bowel movement. If she manually pushes the vaginal bulge forward, she feels the \"pressure\" is off of her bowel and she can more easily pass a bowel movement. No longer sexually active. She discussed her symptoms with Dr. Franco in Ob/Gyn last December who referred her here, she is hoping to discuss surgical options today. (note from 22 reviewed in Epic)    Past Medical History:   Diagnosis Date     Breast cancer (H) 9/10/96     Change in stool habits 2016     Elevated blood pressure 2012     Elevated blood pressure reading without diagnosis of hypertension 10/3/2017     Encounter for screening colonoscopy 2011    High risk, recommended every 5 years, see scan 11, nromal  (Problem list name updated by automated process. Provider to review and confirm.)     Hearing loss      Hyperlipidemia LDL goal <130 2011    [place] risk 5% intermediate age 60      Hypertension     On occasion     Intraductal carcinoma, noninfiltrating 4/3/2013    Noted by cancer registry, I'm not sure who is following her, see scan 4/3/2013      Nasal congestion      Screening colonoscopy 2011       Past Surgical History:   Procedure Laterality Date     HYSTERECTOMY TOTAL ABDOMINAL, BILATERAL SALPINGO-OOPHORECTOMY, COMBINED  2004    lumpectomy left breast - CA     LUMPECTOMY BREAST       SURGICAL HISTORY OF -       hyperplastic colonic polyps - benign       Social History     Socioeconomic History     Marital status: Single     Spouse name: Not on file     Number of children: Not on file     Years of education: Not on file     Highest education level: Not on file   Occupational History     Not on file   Tobacco Use     Smoking status: Former     Packs/day: 0.25     Years: 5.00     Pack years: 1.25     Types: Cigarettes     Quit date: 2000     Years since quittin.1     Smokeless tobacco: Never   Substance and Sexual Activity     Alcohol use: Yes "     Alcohol/week: 2.0 standard drinks     Comment: moderate     Drug use: No     Sexual activity: Not Currently   Other Topics Concern     Parent/sibling w/ CABG, MI or angioplasty before 65F 55M? No   Social History Narrative    2019: retired, lives with partner radha, no pets      Social Determinants of Health     Financial Resource Strain: Not on file   Food Insecurity: Not on file   Transportation Needs: Not on file   Physical Activity: Not on file   Stress: Not on file   Social Connections: Not on file   Intimate Partner Violence: Not on file   Housing Stability: Not on file       Family History   Problem Relation Age of Onset     Hypertension Mother      Breast Cancer Mother         12 years ago     Depression Mother      Psychotic Disorder Mother      Diabetes Father      Hypertension Father      Alcohol/Drug Father        ROS    Allergies   Allergen Reactions     No Known Drug Allergies        Current Outpatient Medications   Medication     Cyanocobalamin (VITAMIN B-12 PO)     red yeast rice 600 MG CAPS     No current facility-administered medications for this visit.       LMP  (LMP Unknown)   GENERAL: healthy, alert and no distress  EYES: Eyes grossly normal to inspection, conjunctivae and sclerae normal  HENT: normal cephalic/atraumatic.  External ears, nose and mouth without ulcers or lesions.  RESP: no audible wheeze, cough, or visible cyanosis.  No visible retractions or increased work of breathing.  Able to speak fully in complete sentences.  NEURO: Cranial nerves grossly intact, mentation intact and speech normal  PSYCH: mentation appears normal, affect normal/bright, judgement and insight intact, normal speech and appearance well-groomed  ABD soft, nontender, nondistended, no organomegaly  : normal external genitalia.  Speculum and bimanual exam are remarkable for stage III vault prolapse    PVR 72 mL by bladder scan    CC  Patient Care Team:  Mery Desai APRN CNP as PCP - General (Nurse  Practitioner Primary Care)  Sandy Hargrove MD as Assigned PCP  Jazlyn Franco MD as Assigned OBGYN Provider  JAZLYN FRANCO

## 2023-02-16 NOTE — NURSING NOTE
Chief Complaint   Patient presents with     Follow Up     Prolapse post hysterectomy       Blood pressure (!) 155/88, pulse 76, height 1.524 m (5'), weight 62.1 kg (137 lb), not currently breastfeeding. Body mass index is 26.76 kg/m .    Patient Active Problem List   Diagnosis     Other specified genital prolapse(618.89)     Skin lesion of chest wall     Skin lesion of right arm     Advance Care Planning     Hypercholesterolemia     Hyperlipidemia LDL goal <130     Overweight (BMI 25.0-29.9)     Environmental allergies     Intraductal carcinoma, noninfiltrating     Impaired fasting blood sugar     Vitamin D deficiency     Hearing loss in right ear     Pelvic relaxation     Family history of colon cancer     Skin lesion of left arm     Chronic fatigue     Need for hepatitis C screening test     Hx of colonic polyps     Hypertropia     Intermittent esotropia, alternating     Personal history of malignant neoplasm of breast     Superior oblique palsy     HIRAM (obstructive sleep apnea)     Hypertension goal BP (blood pressure) < 150/90       Allergies   Allergen Reactions     No Known Drug Allergies        Current Outpatient Medications   Medication Sig Dispense Refill     Cyanocobalamin (VITAMIN B-12 PO)        red yeast rice 600 MG CAPS Take 600 mg by mouth daily         Social History     Tobacco Use     Smoking status: Former     Packs/day: 0.25     Years: 5.00     Pack years: 1.25     Types: Cigarettes     Quit date: 2000     Years since quittin.1     Smokeless tobacco: Never   Substance Use Topics     Alcohol use: Yes     Alcohol/week: 2.0 standard drinks     Comment: moderate     Drug use: No       Laurie Bryan  2023  9:45 AM

## 2023-02-22 ENCOUNTER — TELEPHONE (OUTPATIENT)
Dept: GASTROENTEROLOGY | Facility: CLINIC | Age: 73
End: 2023-02-22
Payer: COMMERCIAL

## 2023-02-22 NOTE — TELEPHONE ENCOUNTER
Caller: Teetee  Reason for Reschedule/Cancellation (please be detailed, any staff messages or encounters to note?):   Jing Ovalle RN  P Endoscopy Scheduling Pool  Hi,   Pt needs to be done at [facility] due to Sleep Apnea, AHI > 30. Please call pt to reschedule.   Thank you,   Jing Ovalle RN        Prior to reschedule please review:    Ordering Provider:Delvin    Sedation per order:CS    Does patient have any ASC Exclusions, please identify?: Yes-HIRAM      Notes on Cancelled Procedure:    Procedure:Lower Endoscopy [Colonoscopy]     Date: 3/7/23    [facility] [address] [place] [address], 5th Floor, [place] [address]    Surgeon:         Rescheduled: No-patient is going to think about things and she will call us back to reschedule    Procedure:      Date:     Location:    Surgeon:     Sedation Level Scheduled  ,  Reason for Sedation Level     Prep/Instructions updated and sent:

## 2023-02-22 NOTE — TELEPHONE ENCOUNTER
Screening Questions  BLUE  KIND OF PREP RED  LOCATION [review exclusion criteria] GREEN  SEDATION TYPE        Y Are you active on mychart?       Sandy Hargrove MD  Ordering/Referring Provider?        BCBS/MEDICARE What type of coverage do you have?      N Have you had a positive covid test in the last 14 days?     24.7 1. BMI  [BMI 40+ - review exclusion criteria]    Y  2. Are you able to give consent for your medical care? [IF NO,RN REVIEW]          N  3. Are you taking any prescription pain medications on a routine schedule   (ex narcotics: oxycodone, roxicodone, oxycontin,  and percocet)? [RN Review]          3a. EXTENDED PREP What kind of prescription?     N 4. Do you have any chemical dependencies such as alcohol, street drugs, or methadone?        **If yes 3- 5 , please schedule with MAC sedation.**          IF YES TO ANY 6 - 10 - HOSPITAL SETTING ONLY.     N 6.   Do you need assistance transferring?     N 7.   Have you had a heart or lung transplant?    N 8.   Are you currently on dialysis?   N 9.   Do you use daily home oxygen?   N 10. Do you take nitroglycerin?   10a.  If yes, how often?     11. [FEMALES]  N Are you currently pregnant?    11a.  If yes, how many weeks? [ Greater than 12 weeks, OR NEEDED]    N 12. Do you have Pulmonary Hypertension? *NEED PAC APPT AT [facility] w/ MAC*     N 13. [review exclusion criteria]  Do you have any implantable devices in your body (pacemaker, defib, LVAD)?    N 14. In the past 6 months, have you had any heart related issues including cardiomyopathy or heart attack?     14a.  If yes, did it require cardiac stenting if so when?     N 15. Have you had a stroke or Transient ischemic attack (TIA - aka  mini stroke ) within 6 months?      N 16. Do you have mod to severe Obstructive Sleep Apnea?  [Hospital only]    N 17. Do you have SEVERE AND UNCONTROLLED asthma? *NEED PAC APPT AT [facility] w/MAC*     N 18. Are you currently taking any blood thinners?     18a. If yes,  "inform patient to \"follow up w/ ordering provider for bridging instructions.\"    N 19. Do you take the medication Phentermine?    19a. If yes, \"Hold for 7 days before procedure.  Please consult your prescribing provider if you have questions about holding this medication.\"     N  20. Do you have chronic kidney disease?      N  21. Do you have a diagnosis of diabetes?     N  22. On a regular basis do you go 3-5 days between bowel movements?      23. Preferred LOCAL Pharmacy for Pre Prescription    [ LIST ONLY ONE PHARMACY]        [facility] - [place], [place] - [address]        - CLOSING REMINDERS -    Informed patient they will need an adult    Cannot take any type of public or medical transportation alone    Conscious Sedation- Needs  for 6 hours after the procedure       MAC/General-Needs  for 24 hours after procedure    Pre-Procedure Covid test to be completed [[facility] PCR Testing Required]    Confirmed Nurse will call to complete assessment       - SCHEDULING DETAILS -  no Hospital Setting Required? If yes, what is the exclusion?: n/a   [place]  Surgeon    03/07/23  Date of Procedure  Lower Endoscopy [Colonoscopy]  Type of Procedure Scheduled  [facility]-If you answer yes to questions #8, #20, #21Which Colonoscopy Prep was Sent?     Moderate Sedation Type     N PAC / Pre-op Required                 "

## 2023-03-08 ENCOUNTER — VIRTUAL VISIT (OUTPATIENT)
Dept: SLEEP MEDICINE | Facility: CLINIC | Age: 73
End: 2023-03-08
Payer: COMMERCIAL

## 2023-03-08 VITALS — HEIGHT: 62 IN | WEIGHT: 135 LBS | BODY MASS INDEX: 24.84 KG/M2

## 2023-03-08 DIAGNOSIS — G47.33 OSA (OBSTRUCTIVE SLEEP APNEA): Primary | ICD-10-CM

## 2023-03-08 PROCEDURE — 99204 OFFICE O/P NEW MOD 45 MIN: CPT | Mod: VID | Performed by: INTERNAL MEDICINE

## 2023-03-08 ASSESSMENT — SLEEP AND FATIGUE QUESTIONNAIRES
HOW LIKELY ARE YOU TO NOD OFF OR FALL ASLEEP WHILE WATCHING TV: SLIGHT CHANCE OF DOZING
HOW LIKELY ARE YOU TO NOD OFF OR FALL ASLEEP IN A CAR, WHILE STOPPED FOR A FEW MINUTES IN TRAFFIC: WOULD NEVER DOZE
HOW LIKELY ARE YOU TO NOD OFF OR FALL ASLEEP WHILE SITTING AND TALKING TO SOMEONE: WOULD NEVER DOZE
HOW LIKELY ARE YOU TO NOD OFF OR FALL ASLEEP WHILE LYING DOWN TO REST IN THE AFTERNOON WHEN CIRCUMSTANCES PERMIT: SLIGHT CHANCE OF DOZING
HOW LIKELY ARE YOU TO NOD OFF OR FALL ASLEEP WHEN YOU ARE A PASSENGER IN A CAR FOR AN HOUR WITHOUT A BREAK: WOULD NEVER DOZE
HOW LIKELY ARE YOU TO NOD OFF OR FALL ASLEEP WHILE SITTING AND READING: SLIGHT CHANCE OF DOZING
HOW LIKELY ARE YOU TO NOD OFF OR FALL ASLEEP WHILE SITTING QUIETLY AFTER LUNCH WITHOUT ALCOHOL: WOULD NEVER DOZE
HOW LIKELY ARE YOU TO NOD OFF OR FALL ASLEEP WHILE SITTING INACTIVE IN A PUBLIC PLACE: WOULD NEVER DOZE

## 2023-03-08 NOTE — PATIENT INSTRUCTIONS
Am I having a home sleep study?  --->Watch the video for the device you are using:    -/drop off device-   [email or web address].com/[email or web address]

## 2023-03-08 NOTE — PROGRESS NOTES
Video-Visit Details    Type of service:  Video Visit    Video Start Time (time video started): 9:28 AM    Video End Time (time video stopped): 9:50 AM    Originating Location (pt. Location): Home        Distant Location (provider location):  Off-site    Mode of Communication:  Video Conference via [employer]     Chief complaint: Would like to reassess sleep apnea    LOV Susan Holguin NP 4/14/2020    History of Present Illness: 72-year-old female with history of breast cancer, hypertension, and and previous diagnosis of sleep apnea.  She had a sleep study done in January 2020 after presenting with fatigue, snort arousals and some snoring.  She tried CPAP for a while and then stopped using it.  She did not think she needed it anymore.  She has had significant and sustained weight loss since that time.  She is lost about 20 pounds.  She states that her partner reports only occasional mild snoring.  Patient is not waking up with snorts anymore.  Typical bedtime is around 9 to 10 PM with rise time around 6 to 7 AM.  She does sometimes wake up around 3 AM and has difficulty returning to sleep for an hour or 2.    No history of restless legs, sleepwalking, sleep talking or dream enactment behavior.    Patient typically has one caffeinated beverage in the morning.  Is not routinely taking naps and does not routinely drink alcohol in the evenings.    Patient is interested in undergoing a potential surgery for uterine prolapse but is required to get a colonoscopy first.  Colonoscopy providers are recommending hospitalization for colonoscopy given her history of sleep apnea.  Finding that her sleep apnea has resolved would be beneficial and that she could do outpatient procedures such as colonoscopy more safely.    Previous sleep study did show a short run of ventricular tachycardia.  Patient does denies any symptoms of palpitations, dizziness presyncope or syncope symptoms.    [place] Sleepiness Scale  Total score -  [place]: 3 (3/8/2023  9:00 AM)   (Less than 10 normal)    Insomnia Severity Scale  JAMEL Total Score: 5  (normal 0-7, mild 8-14, moderate 15-21, severe 22-28)    Past Medical History:   Diagnosis Date     Breast cancer (H) 9/10/96     Change in stool habits 2016     Elevated blood pressure 2012     Elevated blood pressure reading without diagnosis of hypertension 10/3/2017     Encounter for screening colonoscopy 2011    High risk, recommended every 5 years, see scan 11, nromal  (Problem list name updated by automated process. Provider to review and confirm.)     Hearing loss      Hyperlipidemia LDL goal <130 2011    [place] risk 5% intermediate age 60      Hypertension     On occasion     Intraductal carcinoma, noninfiltrating 4/3/2013    Noted by cancer registry, I'm not sure who is following her, see scan 4/3/2013      Nasal congestion      Screening colonoscopy 2011       Allergies   Allergen Reactions     No Known Drug Allergies        Current Outpatient Medications   Medication     Cyanocobalamin (VITAMIN B-12 PO)     red yeast rice 600 MG CAPS     No current facility-administered medications for this visit.       Social History     Socioeconomic History     Marital status: Single     Spouse name: Not on file     Number of children: Not on file     Years of education: Not on file     Highest education level: Not on file   Occupational History     Not on file   Tobacco Use     Smoking status: Former     Packs/day: 0.25     Years: 5.00     Pack years: 1.25     Types: Cigarettes     Quit date: 2000     Years since quittin.1     Smokeless tobacco: Never   Vaping Use     Vaping Use: Never used   Substance and Sexual Activity     Alcohol use: Yes     Alcohol/week: 2.0 standard drinks     Comment: moderate     Drug use: No     Sexual activity: Not Currently   Other Topics Concern     Parent/sibling w/ CABG, MI or angioplasty before 65F 55M? No   Social History Narrative    2019:  "retired, lives with partner radha, no pets      Social Determinants of Health     Financial Resource Strain: Not on file   Food Insecurity: Not on file   Transportation Needs: Not on file   Physical Activity: Not on file   Stress: Not on file   Social Connections: Not on file   Intimate Partner Violence: Not on file   Housing Stability: Not on file       Family History   Problem Relation Age of Onset     Hypertension Mother      Breast Cancer Mother         12 years ago     Depression Mother      Psychotic Disorder Mother      Diabetes Father      Hypertension Father      Alcohol/Drug Father            EXAM:  Ht 1.575 m (5' 2\")   Wt 61.2 kg (135 lb)   LMP  (LMP Unknown)   BMI 24.69 kg/m    GENERAL: Alert and no distress  EYES: Eyes grossly normal to inspection.  No discharge or erythema, or obvious scleral/conjunctival abnormalities.  RESP: No audible wheeze, cough, or visible cyanosis.  No visible retractions or increased work of breathing.    SKIN: Visible skin clear. No significant rash, abnormal pigmentation or lesions.  NEURO: Cranial nerves grossly intact.  Mentation and speech appropriate for age.  PSYCH: Mentation appears normal, affect normal, judgement and insight intact, normal speech and appearance well-groomed.       PSG Split 1/28/2020  Weight 157 lbs BMI 30  Minimal REM on diagnostic  AHI 30.4, RDI 32.3, Lowest O2 Sat 72.4%Time spent less than or equal to 88% was 25.9 minutes.  CPAP 6    PAP download no data since 5/2020    TSH   Date Value Ref Range Status   05/25/2022 1.28 0.40 - 4.00 mU/L Final   11/19/2019 1.20 0.40 - 4.00 mU/L Final       ASSESSMENT:  72-year-old female with a history of breast cancer, hypertension, history of severe supine predominant obstructive sleep apnea.  She has had significant and sustained weight loss of approximately 20 pounds since her diagnosis.  She sleeps predominantly on her sides at home.    PLAN:  I recommended reevaluation of underlying sleep disordered " breathing given sustained and significant weight loss.  Recommend home sleep apnea testing.  I will be contacting patient with the results when they become available.  She is agreeable with this plan.      39 minutes spent on the date of the encounter doing chart review, history and exam, documentation and further activities per the note    Loretta Khoury M.D.  Pulmonary/Critical Care/Sleep Medicine    [facility]   Floor 1, [address]   [address] [place] [address]   Appointments: [phone]    The above note was dictated using voice recognition software and may include typographical errors. Please contact the author for any clarifications.

## 2023-03-08 NOTE — NURSING NOTE
Flu vaccine  10/17/2022    Is the patient currently in the state of [place]? YES    Visit mode:VIDEO    If the visit is dropped, the patient can be reconnected by: VIDEO VISIT: Send to e-mail at: [email or web address]    Will anyone else be joining the visit? NO      How would you like to obtain your AVS? MyChart    Are changes needed to the allergy or medication list? NO    Reason for visit: Follow Up. Last seen April 2020    Annette Altman

## 2023-03-26 ENCOUNTER — HEALTH MAINTENANCE LETTER (OUTPATIENT)
Age: 73
End: 2023-03-26

## 2023-04-08 ENCOUNTER — MYC MEDICAL ADVICE (OUTPATIENT)
Dept: FAMILY MEDICINE | Facility: CLINIC | Age: 73
End: 2023-04-08
Payer: COMMERCIAL

## 2023-04-24 ENCOUNTER — PATIENT OUTREACH (OUTPATIENT)
Dept: CARE COORDINATION | Facility: CLINIC | Age: 73
End: 2023-04-24
Payer: COMMERCIAL

## 2023-05-08 ENCOUNTER — PATIENT OUTREACH (OUTPATIENT)
Dept: CARE COORDINATION | Facility: CLINIC | Age: 73
End: 2023-05-08
Payer: COMMERCIAL

## 2023-05-25 ENCOUNTER — MYC MEDICAL ADVICE (OUTPATIENT)
Dept: FAMILY MEDICINE | Facility: CLINIC | Age: 73
End: 2023-05-25
Payer: COMMERCIAL

## 2023-05-25 DIAGNOSIS — R10.31 ABDOMINAL PAIN, RIGHT LOWER QUADRANT: ICD-10-CM

## 2023-05-25 DIAGNOSIS — K59.09 CHRONIC CONSTIPATION: ICD-10-CM

## 2023-05-25 DIAGNOSIS — K59.00 CONSTIPATION: ICD-10-CM

## 2023-05-25 DIAGNOSIS — R10.32 ABDOMINAL PAIN, LEFT LOWER QUADRANT: Primary | ICD-10-CM

## 2023-05-25 DIAGNOSIS — R19.04 LEFT LOWER QUADRANT ABDOMINAL MASS: ICD-10-CM

## 2023-05-25 NOTE — LETTER
Patient:     Teetee Dalal MRN:  [id number]   [address]   [facility] [address] :  1950  Sex:  F   Phone: [phone]        [employer] # SUBSCRIBER ID   Primary:    JOBY [id number]           Allergies   Allergen Reactions    No Known Drug Allergy        Order Date:  May 30, 2023  Adult GI  Referral - Consult Only       (Order ID: [id number])     [place]  Diagnosis:  Abdominal pain, left lower quadrant (R10.32)  Chronic constipation (K59.09)   Priority:  Routine: Next available opening Expiration Date:  2024 Interval:   Count:    Comments: Please be aware that coverage of these services is subject to the terms and limitations of your health insurance plan.  Call member services at your health plan with any benefit or coverage questions.    Reason for Referral: General GI  Ordered by: Mery Desai APRN CNP  Authorized by:  Mery Desai APRN CNP   (NPI: [id number])

## 2023-05-30 ENCOUNTER — NURSE TRIAGE (OUTPATIENT)
Dept: FAMILY MEDICINE | Facility: CLINIC | Age: 73
End: 2023-05-30
Payer: COMMERCIAL

## 2023-05-30 NOTE — TELEPHONE ENCOUNTER
Mery--    Sending nurse triage encounter to you from today as well--    Unsure how she would go about an inpatient colonoscopy.     Based on nurse triage, do you recommend she see GI and they can facilitate necessary plan for her to get colonoscopy?    Pended [facility] referral. Will need to be faxed    AYDEN FarleyN RN  [facility]

## 2023-05-30 NOTE — TELEPHONE ENCOUNTER
Faxed to [facility]    Tried calling pt to inform her, left VM to call back and ask to speak to care team    Any staff member-- please relay that Mery recommends seeing GI, sent referral to [facility] where she can call # below to schedule    [facility] [address] [address]   Phone: [phone]       AYDEN FarleyN RN  [facility]

## 2023-05-30 NOTE — TELEPHONE ENCOUNTER
Provider Response to 2nd Level Triage Request    I have reviewed the RN documentation. My recommendation is:  Face To Face Visit. Next available appointment

## 2023-05-30 NOTE — TELEPHONE ENCOUNTER
TCs- please contact patient and schedule appt    Can use same day hold this week.     Mery has some available on Thurs    SADE Farley RN  [facility]

## 2023-05-30 NOTE — TELEPHONE ENCOUNTER
Is this a 2nd Level Triage? YES, LICENSED PRACTITIONER REVIEW IS REQUIRED    SITUATION:   Abdominal Pain     BACKGROUND:   Patient is calling with a stomach ache.   The symptoms began on 10/20/22. Patient was suppose to follow up with GYN.   Patient was informed by urology to have a colonoscopy.   Symptoms resolved.     Symptoms first began 3 weeks ago.   Pain is located lower pelvic area and all the way across.   Pain is rated at a 3/10 (currently), worse 7-8/10, intermittent, and can be sharp pain or can be dull.   Feels like chronic constipation.     HOME TREATMENTS:  Eating light  Eating cooked vegetables  Apple cider vinegar to help with indigestion.      PATIENT REQUEST:   To be seen sometime this week.     NURSE RECOMMENDATION:   Advised going to the ER if symptoms worsen.     PLAN:   Routed to scheduling.   Patient did not schedule.     Routed to provider     Please call patient with plan.         AYDEN PattonN, RN, PHN  [facility]  May 30, 2023    Reason for Disposition    MODERATE pain (e.g., interferes with normal activities that comes and goes (cramps) lasts > 24 hours  (Exception: Pain with Vomiting or Diarrhea - see that Protocol.)    Additional Information    Negative: Passed out (i.e., fainted, collapsed and was not responding)    Negative: Shock suspected (e.g., cold/pale/clammy skin, too weak to stand, low BP, rapid pulse)    Negative: Sounds like a life-threatening emergency to the triager    Negative: Chest pain    Negative: Pain is mainly in upper abdomen (if needed ask: 'is it mainly above the belly button?')    Negative: Abdominal pain and pregnant < 20 weeks    Negative: Abdominal pain and pregnant 20 or more weeks    Negative: SEVERE abdominal pain (e.g., excruciating)    Negative: Vomiting red blood or black (coffee ground) material    Negative: Bloody, black, or tarry bowel movements  (Exception: Chronic-unchanged black-grey bowel movements and is taking iron  pills or Pepto-Bismol.)    Negative: Constant abdominal pain lasting > 2 hours    Negative: Vomiting bile (green color)    Negative: Patient sounds very sick or weak to the triager    Negative: Vomiting and abdomen looks much more swollen than usual    Negative: White of the eyes have turned yellow (i.e., jaundice)    Negative: Blood in urine (red, pink, or tea-colored)    Negative: Fever > 103 F (39.4 C)    Negative: Fever > 101 F (38.3 C) and over 60 years of age    Negative: Fever > 100.0 F (37.8 C) and has diabetes mellitus or a weak immune system (e.g., HIV positive, cancer chemotherapy, organ transplant, splenectomy, chronic steroids)    Negative: Fever > 100.0 F (37.8 C) and bedridden (e.g., nursing home patient, stroke, chronic illness, recovering from surgery)    Negative: Pregnant or could be pregnant (i.e., missed last menstrual period)    Protocols used: ABDOMINAL PAIN - FEMALE-A-[place]

## 2023-05-30 NOTE — TELEPHONE ENCOUNTER
TCs can disregard message below    5/25/23 myChart encounter-- Mery recommends GI referral    Bhavana Jones, AYDENN RN  [facility]

## 2023-06-01 ENCOUNTER — HEALTH MAINTENANCE LETTER (OUTPATIENT)
Age: 73
End: 2023-06-01

## 2023-08-20 ENCOUNTER — HEALTH MAINTENANCE LETTER (OUTPATIENT)
Age: 73
End: 2023-08-20

## 2023-10-19 ENCOUNTER — OFFICE VISIT (OUTPATIENT)
Dept: FAMILY MEDICINE | Facility: CLINIC | Age: 73
End: 2023-10-19
Payer: COMMERCIAL

## 2023-10-19 VITALS
SYSTOLIC BLOOD PRESSURE: 158 MMHG | WEIGHT: 148 LBS | OXYGEN SATURATION: 95 % | HEART RATE: 78 BPM | TEMPERATURE: 97.5 F | RESPIRATION RATE: 16 BRPM | DIASTOLIC BLOOD PRESSURE: 93 MMHG | BODY MASS INDEX: 27.23 KG/M2 | HEIGHT: 62 IN

## 2023-10-19 DIAGNOSIS — F32.0 CURRENT MILD EPISODE OF MAJOR DEPRESSIVE DISORDER WITHOUT PRIOR EPISODE (H): ICD-10-CM

## 2023-10-19 DIAGNOSIS — E55.9 VITAMIN D DEFICIENCY: ICD-10-CM

## 2023-10-19 DIAGNOSIS — Z13.1 SCREENING FOR DIABETES MELLITUS: ICD-10-CM

## 2023-10-19 DIAGNOSIS — I10 HYPERTENSION GOAL BP (BLOOD PRESSURE) < 150/90: ICD-10-CM

## 2023-10-19 DIAGNOSIS — Z12.31 VISIT FOR SCREENING MAMMOGRAM: ICD-10-CM

## 2023-10-19 DIAGNOSIS — Z78.0 POSTMENOPAUSAL STATUS: ICD-10-CM

## 2023-10-19 DIAGNOSIS — R53.83 FATIGUE, UNSPECIFIED TYPE: ICD-10-CM

## 2023-10-19 DIAGNOSIS — E78.5 HYPERLIPIDEMIA LDL GOAL <130: ICD-10-CM

## 2023-10-19 DIAGNOSIS — Z00.00 ENCOUNTER FOR MEDICARE ANNUAL WELLNESS EXAM: Primary | ICD-10-CM

## 2023-10-19 DIAGNOSIS — R73.01 IMPAIRED FASTING BLOOD SUGAR: ICD-10-CM

## 2023-10-19 DIAGNOSIS — Z29.11 NEED FOR VACCINATION AGAINST RESPIRATORY SYNCYTIAL VIRUS: ICD-10-CM

## 2023-10-19 DIAGNOSIS — Z12.11 SCREEN FOR COLON CANCER: ICD-10-CM

## 2023-10-19 DIAGNOSIS — Z23 NEED FOR TDAP VACCINATION: ICD-10-CM

## 2023-10-19 LAB
ALBUMIN SERPL BCG-MCNC: 4.1 G/DL (ref 3.5–5.2)
ALP SERPL-CCNC: 94 U/L (ref 35–104)
ALT SERPL W P-5'-P-CCNC: 24 U/L (ref 0–50)
ANION GAP SERPL CALCULATED.3IONS-SCNC: 12 MMOL/L (ref 7–15)
AST SERPL W P-5'-P-CCNC: 31 U/L (ref 0–45)
BASO+EOS+MONOS # BLD AUTO: NORMAL 10*3/UL
BASO+EOS+MONOS NFR BLD AUTO: NORMAL %
BASOPHILS # BLD AUTO: 0.1 10E3/UL (ref 0–0.2)
BASOPHILS NFR BLD AUTO: 1 %
BILIRUB SERPL-MCNC: 1.3 MG/DL
BUN SERPL-MCNC: 11.2 MG/DL (ref 8–23)
CALCIUM SERPL-MCNC: 9.3 MG/DL (ref 8.8–10.2)
CHLORIDE SERPL-SCNC: 104 MMOL/L (ref 98–107)
CHOLEST SERPL-MCNC: 244 MG/DL
CREAT SERPL-MCNC: 0.68 MG/DL (ref 0.51–0.95)
CREAT UR-MCNC: 89.8 MG/DL
DEPRECATED HCO3 PLAS-SCNC: 26 MMOL/L (ref 22–29)
EGFRCR SERPLBLD CKD-EPI 2021: 90 ML/MIN/1.73M2
EOSINOPHIL # BLD AUTO: 0.1 10E3/UL (ref 0–0.7)
EOSINOPHIL NFR BLD AUTO: 1 %
ERYTHROCYTE [DISTWIDTH] IN BLOOD BY AUTOMATED COUNT: 13.5 % (ref 10–15)
FERRITIN SERPL-MCNC: 202 NG/ML (ref 11–328)
GLUCOSE SERPL-MCNC: 92 MG/DL (ref 70–99)
HBA1C MFR BLD: 5.8 % (ref 0–5.6)
HCT VFR BLD AUTO: 42.5 % (ref 35–47)
HDLC SERPL-MCNC: 42 MG/DL
HGB BLD-MCNC: 14.1 G/DL (ref 11.7–15.7)
IMM GRANULOCYTES # BLD: 0 10E3/UL
IMM GRANULOCYTES NFR BLD: 0 %
LDLC SERPL CALC-MCNC: 172 MG/DL
LYMPHOCYTES # BLD AUTO: 1.8 10E3/UL (ref 0.8–5.3)
LYMPHOCYTES NFR BLD AUTO: 25 %
MCH RBC QN AUTO: 29.7 PG (ref 26.5–33)
MCHC RBC AUTO-ENTMCNC: 33.2 G/DL (ref 31.5–36.5)
MCV RBC AUTO: 90 FL (ref 78–100)
MICROALBUMIN UR-MCNC: <12 MG/L
MICROALBUMIN/CREAT UR: NORMAL MG/G{CREAT}
MONOCYTES # BLD AUTO: 0.7 10E3/UL (ref 0–1.3)
MONOCYTES NFR BLD AUTO: 9 %
NEUTROPHILS # BLD AUTO: 4.5 10E3/UL (ref 1.6–8.3)
NEUTROPHILS NFR BLD AUTO: 63 %
NONHDLC SERPL-MCNC: 202 MG/DL
PLATELET # BLD AUTO: 219 10E3/UL (ref 150–450)
POTASSIUM SERPL-SCNC: 3.7 MMOL/L (ref 3.4–5.3)
PROT SERPL-MCNC: 7.1 G/DL (ref 6.4–8.3)
RBC # BLD AUTO: 4.75 10E6/UL (ref 3.8–5.2)
SODIUM SERPL-SCNC: 142 MMOL/L (ref 135–145)
TRIGL SERPL-MCNC: 148 MG/DL
TSH SERPL DL<=0.005 MIU/L-ACNC: 1.06 UIU/ML (ref 0.3–4.2)
VIT B12 SERPL-MCNC: 330 PG/ML (ref 232–1245)
VIT D+METAB SERPL-MCNC: 19 NG/ML (ref 20–50)
WBC # BLD AUTO: 7 10E3/UL (ref 4–11)

## 2023-10-19 PROCEDURE — 82728 ASSAY OF FERRITIN: CPT | Performed by: NURSE PRACTITIONER

## 2023-10-19 PROCEDURE — 83036 HEMOGLOBIN GLYCOSYLATED A1C: CPT | Performed by: NURSE PRACTITIONER

## 2023-10-19 PROCEDURE — 36415 COLL VENOUS BLD VENIPUNCTURE: CPT | Performed by: NURSE PRACTITIONER

## 2023-10-19 PROCEDURE — 84443 ASSAY THYROID STIM HORMONE: CPT | Performed by: NURSE PRACTITIONER

## 2023-10-19 PROCEDURE — 82570 ASSAY OF URINE CREATININE: CPT | Performed by: NURSE PRACTITIONER

## 2023-10-19 PROCEDURE — 80061 LIPID PANEL: CPT | Performed by: NURSE PRACTITIONER

## 2023-10-19 PROCEDURE — 80053 COMPREHEN METABOLIC PANEL: CPT | Performed by: NURSE PRACTITIONER

## 2023-10-19 PROCEDURE — 99214 OFFICE O/P EST MOD 30 MIN: CPT | Mod: 25 | Performed by: NURSE PRACTITIONER

## 2023-10-19 PROCEDURE — 82043 UR ALBUMIN QUANTITATIVE: CPT | Performed by: NURSE PRACTITIONER

## 2023-10-19 PROCEDURE — G0439 PPPS, SUBSEQ VISIT: HCPCS | Performed by: NURSE PRACTITIONER

## 2023-10-19 PROCEDURE — 82306 VITAMIN D 25 HYDROXY: CPT | Performed by: NURSE PRACTITIONER

## 2023-10-19 PROCEDURE — 82607 VITAMIN B-12: CPT | Performed by: NURSE PRACTITIONER

## 2023-10-19 PROCEDURE — 85025 COMPLETE CBC W/AUTO DIFF WBC: CPT | Performed by: NURSE PRACTITIONER

## 2023-10-19 RX ORDER — RESPIRATORY SYNCYTIAL VIRUS VACCINE 120MCG/0.5
0.5 KIT INTRAMUSCULAR ONCE
Qty: 1 EACH | Refills: 0 | Status: CANCELLED | OUTPATIENT
Start: 2023-10-19 | End: 2023-10-19

## 2023-10-19 RX ORDER — LOSARTAN POTASSIUM 25 MG/1
25 TABLET ORAL DAILY
Qty: 90 TABLET | Refills: 1 | Status: SHIPPED | OUTPATIENT
Start: 2023-10-19 | End: 2023-11-20

## 2023-10-19 RX ORDER — VENLAFAXINE HYDROCHLORIDE 37.5 MG/1
37.5 CAPSULE, EXTENDED RELEASE ORAL DAILY
Qty: 90 CAPSULE | Refills: 1 | Status: SHIPPED | OUTPATIENT
Start: 2023-10-19 | End: 2024-01-11

## 2023-10-19 ASSESSMENT — PATIENT HEALTH QUESTIONNAIRE - PHQ9
10. IF YOU CHECKED OFF ANY PROBLEMS, HOW DIFFICULT HAVE THESE PROBLEMS MADE IT FOR YOU TO DO YOUR WORK, TAKE CARE OF THINGS AT HOME, OR GET ALONG WITH OTHER PEOPLE: SOMEWHAT DIFFICULT
SUM OF ALL RESPONSES TO PHQ QUESTIONS 1-9: 8
SUM OF ALL RESPONSES TO PHQ QUESTIONS 1-9: 8

## 2023-10-19 ASSESSMENT — ENCOUNTER SYMPTOMS
NERVOUS/ANXIOUS: 0
PARESTHESIAS: 0
CONSTIPATION: 0
NAUSEA: 0
WEAKNESS: 0
FREQUENCY: 0
HEMATOCHEZIA: 0
CHILLS: 0
MYALGIAS: 0
SORE THROAT: 0
COUGH: 0
ARTHRALGIAS: 0
HEADACHES: 0
HEARTBURN: 0
BREAST MASS: 0
PALPITATIONS: 0
DIZZINESS: 0
JOINT SWELLING: 0
ABDOMINAL PAIN: 0
FEVER: 0
DYSURIA: 0
DIARRHEA: 0
EYE PAIN: 0
SHORTNESS OF BREATH: 0
HEMATURIA: 0

## 2023-10-19 ASSESSMENT — ACTIVITIES OF DAILY LIVING (ADL): CURRENT_FUNCTION: NO ASSISTANCE NEEDED

## 2023-10-19 ASSESSMENT — PAIN SCALES - GENERAL: PAINLEVEL: NO PAIN (0)

## 2023-10-19 NOTE — PROGRESS NOTES
"SUBJECTIVE:   Teetee is a 73 year old who presents for Preventive Visit.      10/19/2023     9:20 AM   Additional Questions   Roomed by Veda GOODMAN.       Are you in the first 12 months of your Medicare coverage?  No    Healthy Habits:     In general, how would you rate your overall health?  Good    Frequency of exercise:  2-3 days/week    Duration of exercise:  15-30 minutes    Do you usually eat at least 4 servings of fruit and vegetables a day, include whole grains    & fiber and avoid regularly eating high fat or \"junk\" foods?  Yes    Taking medications regularly:  No    Barriers to taking medications:  Problems remembering to take them    Medication side effects:  None    Ability to successfully perform activities of daily living:  No assistance needed    Home Safety:  No safety concerns identified    Hearing Impairment:  Difficulty following a conversation in a noisy restaurant or crowded room, feel that people are mumbling or not speaking clearly, need to ask people to speak up or repeat themselves and difficulty understanding soft or whispered speech    In the past 6 months, have you been bothered by leaking of urine?  No    In general, how would you rate your overall mental or emotional health?  Fair    Additional concerns today:  Yes      Today's PHQ-9 Score:       10/19/2023     9:07 AM   PHQ-9 SCORE   PHQ-9 Total Score MyChart 8 (Mild depression)   PHQ-9 Total Score 8           Have you ever done Advance Care Planning? (For example, a Health Directive, POLST, or a discussion with a medical provider or your loved ones about your wishes): Yes, patient states has an Advance Care Planning document and will bring a copy to the clinic.       Fall risk  Fallen 2 or more times in the past year?: No  Any fall with injury in the past year?: No    Cognitive Screening   1) Repeat 3 items (Leader, Season, Table)    2) Clock draw: NORMAL  3) 3 item recall: Recalls 3 objects  Results: 3 items recalled: COGNITIVE IMPAIRMENT " "LESS LIKELY    Mini-CogTM Copyright WILEY Hedrick. Licensed by the author for use in [facility]; reprinted with permission (iain@.[email or web address]). All rights reserved.      Do you have sleep apnea, excessive snoring or daytime drowsiness? : yes    Reviewed and updated as needed this visit by clinical staff   Tobacco  Allergies  Meds              Reviewed and updated as needed this visit by Provider                 Social History     Tobacco Use    Smoking status: Former     Packs/day: 0.25     Years: 5.00     Additional pack years: 0.00     Total pack years: 1.25     Types: Cigarettes     Quit date: 2000     Years since quittin.8    Smokeless tobacco: Never   Substance Use Topics    Alcohol use: Yes     Alcohol/week: 2.0 standard drinks of alcohol     Comment: moderate             10/19/2023     9:06 AM   Alcohol Use   Prescreen: >3 drinks/day or >7 drinks/week? No          No data to display              Do you have a current opioid prescription? No  Do you use any other controlled substances or medications that are not prescribed by a provider? None          PROBLEMS TO ADD ON...    Fatigue- Patient has felt fatigued \"since the s.\" She reports she sleeps 8 hours a night, walks daily, eats a balanced diet, and drinks at least 64oz of water a day. She also reports she has gained 10lbs since February. She has been increasingly stressed due to her partners declining health.     Depression- As mentioned above, patients stress levels have been increased due to her partners declining health. 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 Patient reports she feels tearful \"all the time\" and was tearing up as she was expressing this. She has never had depression before and has never considered taking an antidepressant until now. She does not currently see a therapist, but is open to doing so.    Hypertension- As listed below, patient has had " consistently elevated blood pressures for years. She was treated with Amlodipine and Losartan in the past. She discontinued the Amlodipine due to swelling. She is not sure why she discontinued the Losartan. She checks her blood pressure at home and reports it is consistent with the readings we are getting in office.     BP Readings from Last 3 Encounters:   10/19/23 (!) 158/93   02/16/23 (!) 155/88   12/22/22 (!) 140/84      Current providers sharing in care for this patient include:   Patient Care Team:  Mery Desai APRN CNP as PCP - General (Nurse Practitioner Primary Care)  Sandy Hargrove MD as Assigned PCP  Jazlyn Franco MD as Assigned OBGYN Provider  Heidy Robledo MD as Assigned Surgical Provider  Loretta Khoury MD as Assigned Pulmonology Provider    The following health maintenance items are reviewed in Epic and correct as of today:  Health Maintenance   Topic Date Due    DEXA  Never done    RSV VACCINE 60+ (1 - 1-dose 60+ series) Never done    DTAP/TDAP/TD IMMUNIZATION (3 - Td or Tdap) 04/22/2020    COLORECTAL CANCER SCREENING  07/28/2021    LIPID  08/25/2022    ANNUAL REVIEW OF HM ORDERS  10/01/2022    MAMMO SCREENING  10/19/2022    MEDICARE ANNUAL WELLNESS VISIT  05/24/2023    A1C  05/25/2023    BMP  05/25/2023    MICROALBUMIN  05/25/2023    FALL RISK ASSESSMENT  10/19/2024    ADVANCE CARE PLANNING  05/24/2027    HEPATITIS C SCREENING  Completed    PHQ-2 (once per calendar year)  Completed    INFLUENZA VACCINE  Completed    Pneumococcal Vaccine: 65+ Years  Completed    ZOSTER IMMUNIZATION  Completed    COVID-19 Vaccine  Completed    IPV IMMUNIZATION  Aged Out    HPV IMMUNIZATION  Aged Out    MENINGITIS IMMUNIZATION  Aged Out       Mammogram Screening: Mammogram Screening: Recommended mammography every 1-2 years with patient discussion and risk factor consideration    Mammogram Screening: Recommended mammography every 1-2 years with patient discussion and risk factor  "consideration  Pertinent mammograms are reviewed under the imaging tab.    Review of Systems   Constitutional:  Negative for chills and fever.   HENT:  Positive for hearing loss. Negative for congestion, ear pain and sore throat.    Eyes:  Negative for pain and visual disturbance.   Respiratory:  Negative for cough and shortness of breath.    Cardiovascular:  Negative for chest pain, palpitations and peripheral edema.   Gastrointestinal:  Negative for abdominal pain, constipation, diarrhea, heartburn, hematochezia and nausea.   Breasts:  Negative for tenderness, breast mass and discharge.   Genitourinary:  Negative for dysuria, frequency, genital sores, hematuria, pelvic pain, urgency, vaginal bleeding and vaginal discharge.   Musculoskeletal:  Negative for arthralgias, joint swelling and myalgias.   Skin:  Negative for rash.   Neurological:  Negative for dizziness, weakness, headaches and paresthesias.   Psychiatric/Behavioral:  Positive for mood changes. The patient is not nervous/anxious.          OBJECTIVE:   BP (!) 156/94 (BP Location: Right arm, Patient Position: Sitting, Cuff Size: Adult Regular)   Pulse 78   Temp 97.5  F (36.4  C) (Temporal)   Resp 16   Ht 1.582 m (5' 2.3\")   Wt 67.1 kg (148 lb)   LMP  (LMP Unknown)   SpO2 95%   BMI 26.81 kg/m   Estimated body mass index is 26.81 kg/m  as calculated from the following:    Height as of this encounter: 1.582 m (5' 2.3\").    Weight as of this encounter: 67.1 kg (148 lb).  Physical Exam  GENERAL: healthy, alert and no distress  EYES: Eyes grossly normal to inspection, PERRL and conjunctivae and sclerae normal  HENT: ear canals and TM's normal, nose and mouth without ulcers or lesions  NECK: no adenopathy, no asymmetry, masses, or scars and thyroid normal to palpation  RESP: lungs clear to auscultation - no rales, rhonchi or wheezes  CV: regular rate and rhythm, normal S1 S2, no S3 or S4, no murmur, click or rub, no peripheral edema and peripheral pulses " "strong  ABDOMEN: soft, nontender, no hepatosplenomegaly, no masses and bowel sounds normal  MS: no gross musculoskeletal defects noted, no edema  SKIN: no suspicious lesions or rashes  NEURO: Normal strength and tone, mentation intact and speech normal  PSYCH: mentation appears normal, affect normal/bright    Diagnostic Test Results:  Labs reviewed in Epic    ASSESSMENT / PLAN:   (Z00.00) Encounter for Medicare annual wellness exam  (primary encounter diagnosis)  Comment: Reviewed health maintenance measures. Discussed patients acute concerns - hypertension, depression, and fatigue. See below.   Plan: REVIEW OF HEALTH MAINTENANCE PROTOCOL ORDERS            (R53.83) Fatigue, unspecified type  Comment: As mentioned above, patient has been going through a challenging time with a sick partner. She feels depressed which is a likely cause of her fatigue. Will rule out underlying correctable physiologic causes as well. Patient does have a history of vitamin b-12 deficiency anemia when she was in her 30's.  Plan: CBC with platelets and differential, Ferritin,         TSH with free T4 reflex, Vitamin B12            (F32.0) Current mild episode of major depressive disorder without prior episode (H24)  Comment: Patient reports she has been feeling like she could \"burst into tears at any time.\" She has never been treated for depression before and has never felt like this before. She denies feeling suicidal. She feels her depression is related to her partners poor health. Patient's mother was successfully treated with Effexor. Will try Effexor and patient will follow up in 4-6 weeks to assess dosing/side effects. Could consider Wellbutrin if necessary down the road as patients sister is treated and doing well with Wellbutrin.  Plan: Adult Mental Health  Referral,         venlafaxine (EFFEXOR XR) 37.5 MG 24 hr capsule            (I10) Hypertension goal BP (blood pressure) < 150/90  Comment: Patient has a long history of " hypertension. She has been treated previously with Losartan and Amlodipine, but discontinued both. Discussed side effects of poorly controlled BP on the kidneys and that her last Albumin was slightly elevated. Patient will start Losartan today and follow up in 4-6 weeks when she follows up for depression.  Plan: Albumin Random Urine Quantitative with Creat         Ratio, losartan (COZAAR) 25 MG tablet            (Z13.1) Screening for diabetes mellitus  Comment:   Plan: PRIMARY CARE FOLLOW-UP SCHEDULING,         Comprehensive metabolic panel (BMP + Alb, Alk         Phos, ALT, AST, Total. Bili, TP)            (E55.9) Vitamin D deficiency  Comment: R/o possible cause of fatigue  Plan: DEXA HIP/PELVIS/SPINE - Future, Vitamin D         Deficiency            (Z12.11) Screen for colon cancer  Comment:   Plan: Colonoscopy Screening  Referral            (E78.5) Hyperlipidemia LDL goal <130  Comment:   Plan: Lipid panel reflex to direct LDL Non-fasting            (Z12.31) Visit for screening mammogram  Comment:   Plan: MA SCREENING DIGITAL BILAT - Future  (s+30)            (R73.01) Impaired fasting blood sugar  Comment:   Plan: HEMOGLOBIN A1C            (Z29.11) Need for vaccination against respiratory syncytial virus  Comment: Pt will complete at pharmacy today  Plan:     (Z23) Need for Tdap vaccination  Comment: Patient will complete at pharmacy today  Plan:     (Z78.0) Postmenopausal status  Comment: Patient has never completed a DEXA scan. She would decline medication if osteoporosis was diagnosed so she is hesitant to schedule. She will think about scheduling, but the order is there when and if she decides to. Discussed the importance of vitamin D and calcium intake and weight bearing exercises to sustain bone density.  Plan: DEXA HIP/PELVIS/SPINE - Future            Patient has been advised of split billing requirements and indicates understanding: Yes      COUNSELING:  Reviewed preventive health counseling, as  "reflected in patient instructions       Regular exercise       Healthy diet/nutrition       Vision screening       Dental care       Immunizations  Plans to receive TDAP and RSV at the pharmacy         Osteoporosis prevention/bone health       Colon cancer screening      BMI:   Estimated body mass index is 26.81 kg/m  as calculated from the following:    Height as of this encounter: 1.582 m (5' 2.3\").    Weight as of this encounter: 67.1 kg (148 lb).   Weight management plan: Discussed healthy diet and exercise guidelines      She reports that she quit smoking about 23 years ago. Her smoking use included cigarettes. She has a 1.25 pack-year smoking history. She has never used smokeless tobacco.      Appropriate preventive services were discussed with this patient, including applicable screening as appropriate for fall prevention, nutrition, physical activity, Tobacco-use cessation, weight loss and cognition.  Checklist reviewing preventive services available has been given to the patient.    Reviewed patients plan of care and provided an AVS. The Complex Care Plan (for patients with higher acuity and needing more deliberate coordination of services) for Teetee meets the Care Plan requirement. This Care Plan has been established and reviewed with the Patient.          Melina Manzanares  Physician Attestation   I, ULISES Jeter CNP, was present with the medical/KRISHNA student who participated in the service and in the documentation of the note.  I have verified the history and personally performed the physical exam and medical decision making.  I agree with the assessment and plan of care as documented in the note.        ULISES Jeter CNP   [facility]    Identified Health Risks:    Answers submitted by the patient for this visit:  Patient Health Questionnaire (Submitted on 10/19/2023)  If you checked off any problems, how difficult have these problems made it for you to do your " work, take care of things at home, or get along with other people?: Somewhat difficult  PHQ9 TOTAL SCORE: 8

## 2023-10-19 NOTE — PATIENT INSTRUCTIONS
Patient Education   Personalized Prevention Plan  You are due for the preventive services outlined below.  Your care team is available to assist you in scheduling these services.  If you have already completed any of these items, please share that information with your care team to update in your medical record.  Health Maintenance Due   Topic Date Due     Osteoporosis Screening  Never done     RSV VACCINE 60+ (1 - 1-dose 60+ series) Never done     Diptheria Tetanus Pertussis (DTAP/TDAP/TD) Vaccine (3 - Td or Tdap) 04/22/2020     Colorectal Cancer Screening  07/28/2021     Cholesterol Lab  08/25/2022     ANNUAL REVIEW OF HM ORDERS  10/01/2022     Mammogram  10/19/2022     Annual Wellness Visit  05/24/2023     A1C Lab  05/25/2023     Basic Metabolic Panel  05/25/2023     Kidney Microalbumin Urine Test  05/25/2023     Learning About Depression Screening  What is depression screening?  Depression screening is a way to see if you have depression symptoms. It may be done by a doctor or counselor. It's often part of a routine checkup. That's because your mental health is just as important as your physical health.  Depression is a mental health condition that affects how you feel, think, and act. You may:  Have less energy.  Lose interest in your daily activities.  Feel sad and grouchy for a long time.  Depression is very common. It affects people of all ages.  Many things can lead to depression. Some people become depressed after they have a stroke or find out they have a major illness like cancer or heart disease. The death of a loved one or a breakup may lead to depression. It can run in families. Most experts believe that a combination of inherited genes and stressful life events can cause it.  What happens during screening?  You may be asked to fill out a form about your depression symptoms. You and the doctor will discuss your answers. The doctor may ask you more questions to learn more about how you think, act, and  "feel.  What happens after screening?  If you have symptoms of depression, your doctor will talk to you about your options.  Doctors usually treat depression with medicines or counseling. Often, combining the two works best. Many people don't get help because they think that they'll get over the depression on their own. But people with depression may not get better unless they get treatment.  The cause of depression is not well understood. There may be many factors involved. But if you have depression, it's not your fault.  A serious symptom of depression is thinking about death or suicide. If you or someone you care about talks about this or about feeling hopeless, get help right away.  It's important to know that depression can be treated. Medicine, counseling, and self-care may help.  Where can you learn more?  Go to https://www.[employer].net/patiented  Enter T185 in the search box to learn more about \"Learning About Depression Screening.\"  Current as of: October 20, 2022               Content Version: 13.7    5681-7996 [employer].   Care instructions adapted under license by your healthcare professional. If you have questions about a medical condition or this instruction, always ask your healthcare professional. [employer] disclaims any warranty or liability for your use of this information.         "

## 2023-10-19 NOTE — PROGRESS NOTES
The patient's PHQ-9 score is consistent with mild depression. She was provided with information regarding depression and was advised to schedule a follow up appointment in *** weeks to further address this issue.

## 2023-10-30 ENCOUNTER — ANCILLARY PROCEDURE (OUTPATIENT)
Dept: MAMMOGRAPHY | Facility: CLINIC | Age: 73
End: 2023-10-30
Attending: NURSE PRACTITIONER
Payer: COMMERCIAL

## 2023-10-30 DIAGNOSIS — Z12.31 VISIT FOR SCREENING MAMMOGRAM: ICD-10-CM

## 2023-10-30 PROCEDURE — 77067 SCR MAMMO BI INCL CAD: CPT | Mod: GC

## 2023-10-30 PROCEDURE — 77063 BREAST TOMOSYNTHESIS BI: CPT | Mod: GC

## 2023-11-08 ENCOUNTER — TELEPHONE (OUTPATIENT)
Dept: GASTROENTEROLOGY | Facility: CLINIC | Age: 73
End: 2023-11-08
Payer: COMMERCIAL

## 2023-11-08 ENCOUNTER — OFFICE VISIT (OUTPATIENT)
Dept: BEHAVIORAL HEALTH | Facility: CLINIC | Age: 73
End: 2023-11-08
Attending: NURSE PRACTITIONER
Payer: COMMERCIAL

## 2023-11-08 DIAGNOSIS — F32.0 CURRENT MILD EPISODE OF MAJOR DEPRESSIVE DISORDER WITHOUT PRIOR EPISODE (H): ICD-10-CM

## 2023-11-08 PROCEDURE — 90837 PSYTX W PT 60 MINUTES: CPT | Performed by: SOCIAL WORKER

## 2023-11-08 ASSESSMENT — PATIENT HEALTH QUESTIONNAIRE - PHQ9
SUM OF ALL RESPONSES TO PHQ QUESTIONS 1-9: 5
SUM OF ALL RESPONSES TO PHQ QUESTIONS 1-9: 5
10. IF YOU CHECKED OFF ANY PROBLEMS, HOW DIFFICULT HAVE THESE PROBLEMS MADE IT FOR YOU TO DO YOUR WORK, TAKE CARE OF THINGS AT HOME, OR GET ALONG WITH OTHER PEOPLE: SOMEWHAT DIFFICULT

## 2023-11-08 NOTE — TELEPHONE ENCOUNTER
"Endoscopy Scheduling Screen    Have you had a positive Covid test in the last 14 days?  No    Are you active on MyChart?   Yes    What insurance is in the chart?  Other:  [employer]    Ordering/Referring Provider:  CLAYTON MARTINEZ   (If ordering provider performs procedure, schedule with ordering provider unless otherwise instructed. )    BMI: Estimated body mass index is 26.81 kg/m  as calculated from the following:    Height as of 10/19/23: 1.582 m (5' 2.3\").    Weight as of 10/19/23: 67.1 kg (148 lb).     Sedation Ordered  MAC/deep sedation.   BMI<= 45 45 < BMI <= 48 48 < BMI < = 50  BMI > 50   No Restrictions No [facility]  No [facility]  [facility] with exceptions Hospital Only OR Only       Are you taking any prescription medications for pain 3 or more times per week?   No    Do you have a history of malignant hyperthermia or adverse reaction to anesthesia?  No    (Females) Are you currently pregnant?   No     Have you been diagnosed or told you have pulmonary hypertension?   No    Do you have an LVAD?  No    Have you been told you have moderate to severe sleep apnea?  Yes (RN Review required for scheduling unless scheduling in Hospital.)    Have you been told you have COPD, asthma, or any other lung disease?  No    Do you have any heart conditions?  No     Have you ever had an organ transplant?   No    Have you ever had or are you awaiting a heart or lung transplant?   No    Have you had a stroke or transient ischemic attack (TIA aka \"mini stroke\" in the last 6 months?   No    Have you been diagnosed with or been told you have cirrhosis of the liver?   No    Are you currently on dialysis?   No    Do you need assistance transferring?   No    BMI: Estimated body mass index is 26.81 kg/m  as calculated from the following:    Height as of 10/19/23: 1.582 m (5' 2.3\").    Weight as of 10/19/23: 67.1 kg (148 lb).     Is patients BMI > 40 and scheduling location [facility]?  No    Do you take an injectable medication for weight loss or " diabetes (excluding insulin)?  No    Do you take the medication Naltrexone?  No    Do you take blood thinners?  No       Prep   Are you currently on dialysis or do you have chronic kidney disease?  No    Do you have a diagnosis of diabetes?  No    Do you have a diagnosis of cystic fibrosis (CF)?  No    On a regular basis do you go 3 -5 days between bowel movements?  No    BMI > 40?  No    Preferred Pharmacy:        [facility] - [place], [place] - [address] [place] [address]  Phone: [phone] Fax: [phone]      Final Scheduling Details   Colonoscopy prep sent?  Standard MiraLAX    Procedure scheduled  Colonoscopy    Surgeon:  zeyad     Date of procedure:  1/18/24     Pre-OP / PAC:   Yes - Patient informed of pre-op requirement.    Location  [facility] - Per exclusion criteria.    Sedation   MAC/Deep Sedation - Per order.      Patient Reminders:   You will receive a call from a Nurse to review instructions and health history.  This assessment must be completed prior to your procedure.  Failure to complete the Nurse assessment may result in the procedure being cancelled.      On the day of your procedure, please designate an adult(s) who can drive you home stay with you for the next 24 hours. The medicines used in the exam will make you sleepy. You will not be able to drive.      You cannot take public transportation, ride share services, or non-medical taxi service without a responsible caregiver.  Medical transport services are allowed with the requirement that a responsible caregiver will receive you at your destination.  We require that drivers and caregivers are confirmed prior to your procedure.

## 2023-11-10 ASSESSMENT — COLUMBIA-SUICIDE SEVERITY RATING SCALE - C-SSRS
TOTAL  NUMBER OF INTERRUPTED ATTEMPTS LIFETIME: NO
2. HAVE YOU ACTUALLY HAD ANY THOUGHTS OF KILLING YOURSELF?: NO
1. HAVE YOU WISHED YOU WERE DEAD OR WISHED YOU COULD GO TO SLEEP AND NOT WAKE UP?: NO
TOTAL  NUMBER OF ABORTED OR SELF INTERRUPTED ATTEMPTS LIFETIME: NO
6. HAVE YOU EVER DONE ANYTHING, STARTED TO DO ANYTHING, OR PREPARED TO DO ANYTHING TO END YOUR LIFE?: NO
ATTEMPT LIFETIME: NO

## 2023-11-10 NOTE — PROGRESS NOTES
[facility] Primary Care: Integrated Behavioral Health  November 8, 2023     Disclaimer: This note consists of symbols derived from keyboarding, dictation and/or voice recognition software. As a result, there may be errors in the script that have gone undetected. Please consider this when interpreting information found in this chart    Behavioral Health Clinician Progress Note    Patient Name: Teetee Dalal           Service Type:  Individual      Service Location:   Face to Face in Clinic     Session Start Time:400pm   Session End Time: 500pm      Session Length: 53 - 60      Attendees: Client     Service Modality:  In-person    Visit Activities (Refresh list every visit): NEW    Diagnostic Assessment Date: To be completed the patient continues  Treatment Plan Date: To be completed the patient continues  PROMIS (reviewed every 90 days):     Assessments:  The following assessments were completed by patient for this visit:  PHQ9:       10/27/2015     1:52 PM 12/20/2018     2:15 PM 5/24/2022     9:39 AM 10/19/2023     9:07 AM 11/8/2023     3:43 PM   PHQ-9 SCORE   PHQ-9 Total Score MyChart    8 (Mild depression) 5 (Mild depression)   PHQ-9 Total Score 11 8 5 8 5     GAD7:       12/20/2018     2:15 PM   SMITH-7 SCORE   Total Score 4     CAGE-AID:        No data to display              PROMIS 10-Global Health (only subscores and total score):        No data to display              [place] Suicide Severity Rating Scale (Lifetime/Recent)      11/10/2023     6:52 AM   [place] Suicide Severity Rating (Lifetime/Recent)   Q1 Wish to be Dead (Lifetime) N   Q2 Non-Specific Active Suicidal Thoughts (Lifetime) N   Actual Attempt (Lifetime) N   Has subject engaged in non-suicidal self-injurious behavior? (Lifetime) N   Interrupted Attempts (Lifetime) N   Aborted or Self-Interrupted Attempt (Lifetime) N   Preparatory Acts or Behavior (Lifetime) N   Calculated C-SSRS Risk Score (Lifetime/Recent) No Risk  Indicated     Previous PHQ-9:       5/24/2022     9:39 AM 10/19/2023     9:07 AM 11/8/2023     3:43 PM   PHQ-9 SCORE   PHQ-9 Total Score MyChart  8 (Mild depression) 5 (Mild depression)   PHQ-9 Total Score 5 8 5     Previous SMITH-7:       12/20/2018     2:15 PM   SMITH-7 SCORE   Total Score 4         12/20/2018     2:15 PM   SMITH-7   [employer], 2002; Used with Permission)   1. Feeling nervous, anxious, or on edge 0   2. Not being able to stop or control worrying 0   3. Worrying too much about different things 0   4. Trouble relaxing 0   5. Being so restless that it is hard to sit still 0   6. Becoming easily annoyed or irritable 3   7. Feeling afraid, as if something awful might happen 1   SMITH-7 Total Score 4   If you checked any problems, how difficult have they made it for you to do your work, take care of things at home, or get along with other people? Somewhat difficult         5/24/2022     9:39 AM 10/19/2023     9:07 AM 11/8/2023     3:43 PM   PHQ-9 ([employer])   1.  Little interest or pleasure in doing things 0 2 1   2.  Feeling down, depressed, or hopeless 0 2 1   3.  Trouble falling or staying asleep, or sleeping too much 1 0 1   4.  Feeling tired or having little energy 3 3 2   5.  Poor appetite or overeating 0 0 0   6.  Feeling bad about yourself - or that you are a failure or have let yourself or your family down 0 1 0   7.  Trouble concentrating on things, such as reading the newspaper or watching television 1 0 0   8.  Moving or speaking so slowly that other people could have noticed. Or the opposite - being so fidgety or restless that you have been moving around a lot more than usual 0 0 0   9.  Thoughts that you would be better off dead, or of hurting yourself in some way 0 0 0   PHQ-9 Total Score 5 8 5   If you checked off any problems, how difficult have these problems made it for you to do your work, take care of things at home, or get along with other people? Somewhat difficult     6.  Feeling bad  about yourself 0 1 0   7.  Trouble concentrating 1 0 0   8.  Moving slowly or restless 0 0 0   9.  Suicidal or self-harm thoughts 0 0 0   Difficulty at work, home, or with people Somewhat difficult     1.  Little interest or pleasure in doing things  More than half the days Several days   2.  Feeling down, depressed, or hopeless  More than half the days Several days   3.  Trouble falling or staying asleep, or sleeping too much  Not at all Several days   4.  Feeling tired or having little energy  Nearly every day More than half the days   5.  Poor appetite or overeating  Not at all Not at all   6.  Feeling bad about yourself  Several days Not at all   7.  Trouble concentrating  Not at all Not at all   8.  Moving slowly or restless  Not at all Not at all   9.  Suicidal or self-harm thoughts  Not at all Not at all   PHQ-9 via [facility] TOTAL SCORE----->  8 (Mild depression) 5 (Mild depression)   Difficulty at work, home, or with people  Somewhat difficult Somewhat difficult       ASIM LEVEL:      11/18/2010    11:00 AM   ASIM Score (Last Two)   ASIM Raw Score 45   Activation Score 73.1   ASIM Level 4       DATA  Extended Session (60+ minutes): PROLONGED SERVICE IN THE OUTPATIENT SETTING REQUIRING DIRECT (FACE-TO-FACE) PATIENT CONTACT BEYOND THE USUAL SERVICE:    - Patient's presenting concerns require more intensive intervention than could be completed within the usual service  Interactive Complexity: No  Crisis: No  [facility] Patient: No    Treatment Objective(s) Addressed in This Session:    Depressed Mood: Increase interest, engagement, and pleasure in doing things  Feel less tired and more energy during the day   Identify negative self-talk and behaviors: challenge core beliefs, myths, and actions  Improve concentration, focus, and mindfulness in daily activities   Relationship Problems: will address relationship difficulties in a more adaptive manner    Current Stressors / Issues:      Patient is a pleasant, retired  "73-year-old woman who was referred to the [facility] by her primary care physician.  Patient is noting increased depressive symptoms but also struggling as a caregiver to support the chronic health of her partner.    Patient met with her primary care physician October 19, 2023 reported following concerns.  Patient was prescribed Effexor and referred to [facility].    Summary of progress notes by Cecily Desai, PCP dated October 19, 2023  .  Fatigue- Patient has felt fatigued \"since the 60s.\" She reports she sleeps 8 hours a night, walks daily, eats a balanced diet, and drinks at least 64oz of water a day. She also reports she has gained 10lbs since February. She has been increasingly stressed due to her partners declining health.      Depression- As mentioned above, patients stress levels have been increased due to her partners declining health. 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 Patient reports she feels tearful \"all the time\" and was tearing up as she was expressing this. She has never had depression before and has never considered taking an antidepressant until now. She does not currently see a therapist, but is open to doing so.    Patient had that she was a therapist for many years but is now retired.  Patient reports she has met with different therapist over the years but feels she has not made a \"connection\".  [facility] explained his role.  Patient demonstrated good insight and awareness of the issues that are causing distress for her.  Discussed with patient to recognize the stressors continue to explore what it is about the stressors that is causing her distress.  Patient felt it was helpful to process with an outside therapist.    Patient provided brief history.  Patient reports she retired 10 years ago and was looking forward to \"Muñoz years\" of traveling with her partner and being active.  Patient has been with her partner for over 30 years.  Patient " "described a very active lifestyle as a child but also as an adult.  Patient reports 10 years ago she had multiple losses.  Patient reports within a span of 3 years, she lost her mother, father and mother-in-law which were all overwhelming for her.  Patient adds that 5 years ago, they decided to move to back it would [place] for her partner's health as her home was \"old and javy\" which causes a lot of reactions to her partner's compromised immune system.  However, patient reports that joy is old and has similar air quality issues within the building but also chemicals on the lawn.  Patient reports that both she and her partner are active on the different committees at back at work.  Patient reports her partner is an active environmentalist.    Patient reports that she was the oldest child and has 3 younger sisters and 1 brother.  Patient reports she had a very loving, active childhood.  Patient reports her mom was a traditional stay-at-home parent who shared their active.  Patient added that she had over 100 cousins and felt important she was the oldest until another family member  who had older cousins than herself.  Patient reports that her mother suffered from severe depression and recalls she had ECT at times.  Patient described herself more like her father would often be making jokes and give advice to not to over think and would say things would \"be okay\".  patient reports that her father's waynetone reads \"it was fun while it lasted\".    Patient reports she has 2 adult children.  Patient reports 1 son age 53 is recovering alcoholic and has been sober for the past 5 years but reports he is \"difficult.  Patient reports ongoing tension with his alcohol use.  Patient reports ongoing issue of contention is that she refused to write a support custody letter for his daughter when she was 4 years old.  Patient reports he continues to be upset with this.  Patient reports at the time, her son refused " "to allow her to have contact with her grandchildren for 4 years.  Patient recalls that time that she \"I was a wreck\" when her son got  and refused to have contact for 4 years.  Reflected back to patient that family relationships appear to be important for her.  Patient describes she feels a \"hole in my heart\" with the ongoing tension with her son.  Patient reports she attends [facility] for support and found this has been helpful.  Patient reports she has another son aged 49 who she is close to.    Explored patient her current symptoms.  Patient notes that his feelings both irritable and short and lacking compassion for the chronic health issues with her partner.  Patient feels the Effexor is reducing her irritability and anger.  Patient expressed frustration and resentment that she does not have a \"better life\". patient reports she feels disappointed as a partner spends a lot of time on the couch due to her lack of energy.  Patient reports initially she felt her partner was not motivated or trying but is now accepting that her partners low is more due to underlying medical condition.  Patient noted that her partner has a family cottage in [place] that they would go to each year but since 2019, her partner has not had the energy to return.    Gently explored with patient that she has insight and rationale of her partner health issues but appears to be angry and frustrated at her.  Explored the difference of patient being angry and frustrated with the situation versus with her partner.  Patient reports she would need to think about that further.      Gently explored with patient the principles of acceptance and commitment therapy and discussed \"what she is trying to fix\".    Reflected back to patient that she has a caregiver and has experienced multiple losses in her life with her own children, her parents, sense of community with the move in with her partner now.  Reflected back to patient that cannot change her " situation and explore her perception and interpretation that is impacting her reaction that is causing her distress.  Encouraged patient to notice the thoughts interpretation she is making that is adding to distress.  Acknowledged with patient that her current life situation with her partner is stressful but encouraged her to explore what is causing the distress within her.    Plan    Patient felt the initial session was helpful and was open to reschedule the [facility].    [facility] will provide additional information on acceptance and commitment therapy.        Progress on Treatment Objective(s) / Homework:  Minimal progress - PREPARATION (Decided to change - considering how); Intervened by negotiating a change plan and determining options / strategies for behavior change, identifying triggers, exploring social supports, and working towards setting a date to begin behavior change    Motivational Interviewing    MI Intervention: Supported Autonomy, Collaboration, Evocation, Permission to raise concern or advise, Open-ended questions, and Reflections: simple and complex     Change Talk Expressed by the Patient: Desire to change Ability to change Reasons to change Need to change Committment to change Activation    Provider Response to Change Talk: E - Evoked more info from patient about behavior change, A - Affirmed patient's thoughts, decisions, or attempts at behavior change, R - Reflected patient's change talk, and S - Summarized patient's change talk statements  PSYCHODYMANIC PSYCHOTHERAPY: Discussed patient's emotional dynamics and issues and how they impact behaviors,Explored patient's history of relationships and how they impact present behaviors, Explored how to work with and make changes in these schemas and patterns  ACCEPTANCE AND COMMITMENT THERAPY: Explored and identified important values in patient's life, Discussed ways to commit to behavioral activation around these values    Care Plan review completed:  No    Medication Review:  No changes to current psychiatric medication(s)    Medication Compliance:  Yes    Changes in Health Issues:   None reported    Chemical Use Review:   Substance Use: Chemical use reviewed, no active concerns identified      Tobacco Use: No current tobacco use.      Assessment: Current Emotional / Mental Status (status of significant symptoms):  Risk status (Self / Other harm or suicidal ideation)  Patient denies a history of suicidal ideation, suicide attempts, self-injurious behavior, homicidal ideation, homicidal behavior, and and other safety concerns  Patient denies current fears or concerns for personal safety.  Patient denies current or recent suicidal ideation or behaviors.  Patient denies current or recent homicidal ideation or behaviors.  Patient denies current or recent self injurious behavior or ideation.  Patient denies other safety concerns.  A safety and risk management plan has not been developed at this time, however patient was encouraged to call [place] [phone] should there be a change in any of these risk factors.        Appearance:   Appropriate   Eye Contact:   Good   Psychomotor Behavior: Normal   Attitude:   Cooperative   Orientation:   All  Speech   Rate / Production: Normal/ Responsive Normal    Volume:  Normal   Mood:    Normal Sad   Affect:    Appropriate  Flat   Thought Content:  Clear   Thought Form:  Coherent  Logical   Insight:    Fair     Patient has a hearing loss.  At times, asked for clarification from [facility].    Diagnoses:  1. Current mild episode of major depressive disorder without prior episode (H24)        Collateral Reports Completed:  Routed note to PCP    Plan: (Homework, other):  Patient was given information about behavioral services and encouraged to schedule a follow up appointment with the clinic [facility]   in 2 weeks.   information about mental health symptoms and treatment options  and information on ACT -introduction and websites She  CD  Recommendations: No indications of CD issues.         Natan Krishna, LICSW  November 10, 2023

## 2023-11-20 ENCOUNTER — VIRTUAL VISIT (OUTPATIENT)
Dept: FAMILY MEDICINE | Facility: CLINIC | Age: 73
End: 2023-11-20
Payer: COMMERCIAL

## 2023-11-20 DIAGNOSIS — I10 HYPERTENSION GOAL BP (BLOOD PRESSURE) < 150/90: Primary | ICD-10-CM

## 2023-11-20 DIAGNOSIS — F32.0 CURRENT MILD EPISODE OF MAJOR DEPRESSIVE DISORDER WITHOUT PRIOR EPISODE (H): ICD-10-CM

## 2023-11-20 PROCEDURE — 99214 OFFICE O/P EST MOD 30 MIN: CPT | Mod: VID | Performed by: NURSE PRACTITIONER

## 2023-11-20 RX ORDER — LOSARTAN POTASSIUM 50 MG/1
50 TABLET ORAL DAILY
Qty: 90 TABLET | Refills: 1 | Status: SHIPPED | OUTPATIENT
Start: 2023-11-20 | End: 2024-05-13

## 2023-11-20 NOTE — PROGRESS NOTES
Teetee is a 73 year old who is being evaluated via a billable video visit.      How would you like to obtain your AVS? MyChart  If the video visit is dropped, the invitation should be resent by: Text to cell phone: [phone]  Will anyone else be joining your video visit? No          Assessment & Plan     Hypertension goal BP (blood pressure) < 150/90  Improved but continues to be borderline.  Will increase losartan today to 50mg.    - losartan (COZAAR) 50 MG tablet; Take 1 tablet (50 mg) by mouth daily    Current mild episode of major depressive disorder without prior episode (H24)  Fatigue and depressive symptoms improved with effexor.  Tolerating medication, no changes at this time        ULISES Jeter [facility]   Teetee is a 73 year old, presenting for the following health issues:  Follow Up (meds)      11/20/2023     9:13 AM   Additional Questions   Roomed by Hector   Accompanied by self       History of Present Illness       Reason for visit:  Follow up for meds    She eats 4 or more servings of fruits and vegetables daily.She consumes 0 sweetened beverage(s) daily.She exercises with enough effort to increase her heart rate 9 or less minutes per day.  She exercises with enough effort to increase her heart rate 3 or less days per week.   She is taking medications regularly.       Feeling better since starting effexor  BP has been improved with losartan  Did talk to therapist and found it helpful.        Review of Systems   Constitutional, HEENT, cardiovascular, pulmonary, gi and gu systems are negative, except as otherwise noted.      Objective           Vitals:  No vitals were obtained today due to virtual visit.    Physical Exam   GENERAL: Healthy, alert and no distress  EYES: Eyes grossly normal to inspection.  No discharge or erythema, or obvious scleral/conjunctival abnormalities.  RESP: No audible wheeze, cough, or visible cyanosis.  No visible  retractions or increased work of breathing.    SKIN: Visible skin clear. No significant rash, abnormal pigmentation or lesions.  NEURO: Cranial nerves grossly intact.  Mentation and speech appropriate for age.  PSYCH: Mentation appears normal, affect normal/bright, judgement and insight intact, normal speech and appearance well-groomed.                Video-Visit Details    Type of service:  Video Visit   Video Start Time:  1000  Video End Time: 1008    Originating Location (pt. Location): Home    Distant Location (provider location):  Off-site  Platform used for Video Visit: Incont

## 2023-11-27 ENCOUNTER — OFFICE VISIT (OUTPATIENT)
Dept: BEHAVIORAL HEALTH | Facility: CLINIC | Age: 73
End: 2023-11-27
Payer: COMMERCIAL

## 2023-11-27 DIAGNOSIS — F32.0 CURRENT MILD EPISODE OF MAJOR DEPRESSIVE DISORDER WITHOUT PRIOR EPISODE (H): Primary | ICD-10-CM

## 2023-11-27 PROCEDURE — 90837 PSYTX W PT 60 MINUTES: CPT | Performed by: SOCIAL WORKER

## 2023-11-27 NOTE — PROGRESS NOTES
[facility] Primary Care: Integrated Behavioral Health  November 27, 2023     Disclaimer: This note consists of symbols derived from keyboarding, dictation and/or voice recognition software. As a result, there may be errors in the script that have gone undetected. Please consider this when interpreting information found in this chart    Behavioral Health Clinician Progress Note    Patient Name: Teetee Dalal           Service Type:  Individual      Service Location:   Face to Face in Clinic     Session Start Time:1030am   Session End Time: 1130am      Session Length: 53 - 60      Attendees: Client     Service Modality:  In-person    Visit Activities (Refresh list every visit): NEW    Diagnostic Assessment Date: To be completed the patient continues  Treatment Plan Date: To be completed the patient continues  PROMIS (reviewed every 90 days):     Assessments:  The following assessments were completed by patient for this visit:  PHQ9:       10/27/2015     1:52 PM 12/20/2018     2:15 PM 5/24/2022     9:39 AM 10/19/2023     9:07 AM 11/8/2023     3:43 PM   PHQ-9 SCORE   PHQ-9 Total Score MyChart    8 (Mild depression) 5 (Mild depression)   PHQ-9 Total Score 11 8 5 8 5     GAD7:       12/20/2018     2:15 PM   SMITH-7 SCORE   Total Score 4     CAGE-AID:        No data to display              PROMIS 10-Global Health (only subscores and total score):       11/27/2023    10:26 AM   PROMIS-10 Scores Only   Global Mental Health Score 11   Global Physical Health Score 16   PROMIS TOTAL - SUBSCORES 27     [place] Suicide Severity Rating Scale (Lifetime/Recent)      11/10/2023     6:52 AM   [place] Suicide Severity Rating (Lifetime/Recent)   Q1 Wish to be Dead (Lifetime) N   Q2 Non-Specific Active Suicidal Thoughts (Lifetime) N   Actual Attempt (Lifetime) N   Has subject engaged in non-suicidal self-injurious behavior? (Lifetime) N   Interrupted Attempts (Lifetime) N   Aborted or Self-Interrupted  Attempt (Lifetime) N   Preparatory Acts or Behavior (Lifetime) N   Calculated C-SSRS Risk Score (Lifetime/Recent) No Risk Indicated     Previous PHQ-9:       5/24/2022     9:39 AM 10/19/2023     9:07 AM 11/8/2023     3:43 PM   PHQ-9 SCORE   PHQ-9 Total Score MyChart  8 (Mild depression) 5 (Mild depression)   PHQ-9 Total Score 5 8 5     Previous SMITH-7:       12/20/2018     2:15 PM   SMITH-7 SCORE   Total Score 4         12/20/2018     2:15 PM   SMITH-7   [employer], 2002; Used with Permission)   1. Feeling nervous, anxious, or on edge 0   2. Not being able to stop or control worrying 0   3. Worrying too much about different things 0   4. Trouble relaxing 0   5. Being so restless that it is hard to sit still 0   6. Becoming easily annoyed or irritable 3   7. Feeling afraid, as if something awful might happen 1   SMITH-7 Total Score 4   If you checked any problems, how difficult have they made it for you to do your work, take care of things at home, or get along with other people? Somewhat difficult         5/24/2022     9:39 AM 10/19/2023     9:07 AM 11/8/2023     3:43 PM   PHQ-9 ([employer])   1.  Little interest or pleasure in doing things 0 2 1   2.  Feeling down, depressed, or hopeless 0 2 1   3.  Trouble falling or staying asleep, or sleeping too much 1 0 1   4.  Feeling tired or having little energy 3 3 2   5.  Poor appetite or overeating 0 0 0   6.  Feeling bad about yourself - or that you are a failure or have let yourself or your family down 0 1 0   7.  Trouble concentrating on things, such as reading the newspaper or watching television 1 0 0   8.  Moving or speaking so slowly that other people could have noticed. Or the opposite - being so fidgety or restless that you have been moving around a lot more than usual 0 0 0   9.  Thoughts that you would be better off dead, or of hurting yourself in some way 0 0 0   PHQ-9 Total Score 5 8 5   If you checked off any problems, how difficult have these problems made it for you  to do your work, take care of things at home, or get along with other people? Somewhat difficult     6.  Feeling bad about yourself 0 1 0   7.  Trouble concentrating 1 0 0   8.  Moving slowly or restless 0 0 0   9.  Suicidal or self-harm thoughts 0 0 0   Difficulty at work, home, or with people Somewhat difficult     1.  Little interest or pleasure in doing things  More than half the days Several days   2.  Feeling down, depressed, or hopeless  More than half the days Several days   3.  Trouble falling or staying asleep, or sleeping too much  Not at all Several days   4.  Feeling tired or having little energy  Nearly every day More than half the days   5.  Poor appetite or overeating  Not at all Not at all   6.  Feeling bad about yourself  Several days Not at all   7.  Trouble concentrating  Not at all Not at all   8.  Moving slowly or restless  Not at all Not at all   9.  Suicidal or self-harm thoughts  Not at all Not at all   PHQ-9 via [employer] TOTAL SCORE----->  8 (Mild depression) 5 (Mild depression)   Difficulty at work, home, or with people  Somewhat difficult Somewhat difficult       ASIM LEVEL:      11/18/2010    11:00 AM   ASIM Score (Last Two)   ASIM Raw Score 45   Activation Score 73.1   ASIM Level 4       DATA  Extended Session (60+ minutes): PROLONGED SERVICE IN THE OUTPATIENT SETTING REQUIRING DIRECT (FACE-TO-FACE) PATIENT CONTACT BEYOND THE USUAL SERVICE:    - Patient's presenting concerns require more intensive intervention than could be completed within the usual service  Interactive Complexity: No  Crisis: No  [facility] Patient: No    Treatment Objective(s) Addressed in This Session:    Depressed Mood: Increase interest, engagement, and pleasure in doing things  Feel less tired and more energy during the day   Identify negative self-talk and behaviors: challenge core beliefs, myths, and actions  Improve concentration, focus, and mindfulness in daily activities   Relationship Problems: will address  "relationship difficulties in a more adaptive manner    Current Stressors / Issues:      Patient is a pleasant, retired 73-year-old woman who was referred to the [facility] by her primary care physician.  Patient is noting increased depressive symptoms but also struggling as a caregiver to support the chronic health of her partner.    Patient reports she found initial session helpful and is noticing that she is to continue to experience the same emotions but not with the same level of intensity.  Discussed principles of acceptance and commitment therapy further.  Unfortunately, patient had not seen the [facility] information on ACT.  Patient will review further.    Patient provided several examples in which she feels discounted by her partner and trying to problem solve her chronic medical issues.  Patient acknowledges that her partner has accepted nothing will work but she continues to try to \"fix it\".  Explored patient further the function of trying to \"fix it\".  Patient began noticing that made her feel competent but also provide a sense of compassion to mitigate her partners suffering.  Explored with patient not trying to \"fix\" the situation but rather noticing what is inside of her and fixing that emotion.  Provided different examples to patient.  Discussed using mindfulness as a helpful tool to Axis these emotions and physical symptoms.    Acknowledges patient that on 1 hand feels compassionate and caring towards her partner but the same time can be resentful and frustrated that does not have the quality of life that she wants to.  Reflected back to patient that her partner has her movie but she feels she has to follow it more often than her own movie.    Patient described her partner as a \"parents\" which often leads to frustration and feeling annoyed.  Again, for the patient what her values and her self talk were that led to that frustration.    In addition, discussed with patient utilizing except accelerated resolution " "therapy in addition to ACT.    [facility] provided additional information via [employer] the patient.    [facility] progress note forward from November 7, 2023  Patient met with her primary care physician October 19, 2023 reported following concerns.  Patient was prescribed Effexor and referred to [facility].    Summary of progress notes by Cecily Desai, PCP dated October 19, 2023  .  Fatigue- Patient has felt fatigued \"since the 60s.\" She reports she sleeps 8 hours a night, walks daily, eats a balanced diet, and drinks at least 64oz of water a day. She also reports she has gained 10lbs since February. She has been increasingly stressed due to her partners declining health.      Depression- As mentioned above, patients stress levels have been increased due to her partners declining health. 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 Patient reports she feels tearful \"all the time\" and was tearing up as she was expressing this. She has never had depression before and has never considered taking an antidepressant until now. She does not currently see a therapist, but is open to doing so.    Patient had that she was a therapist for many years but is now retired.  Patient reports she has met with different therapist over the years but feels she has not made a \"connection\".  [facility] explained his role.  Patient demonstrated good insight and awareness of the issues that are causing distress for her.  Discussed with patient to recognize the stressors continue to explore what it is about the stressors that is causing her distress.  Patient felt it was helpful to process with an outside therapist.    Patient provided brief history.  Patient reports she retired 10 years ago and was looking forward to \"Muñoz years\" of traveling with her partner and being active.  Patient has been with her partner for over 30 years.  Patient described a very active lifestyle as a child but also as an adult.  " "Patient reports 10 years ago she had multiple losses.  Patient reports within a span of 3 years, she lost her mother, father and mother-in-law which were all overwhelming for her.  Patient adds that 5 years ago, they decided to move to back it would [place] for her partner's health as her home was \"old and javy\" which causes a lot of reactions to her partner's compromised immune system.  However, patient reports that joy is old and has similar air quality issues within the building but also chemicals on the lawn.  Patient reports that both she and her partner are active on the different committees at back at work.  Patient reports her partner is an active environmentalist.    Patient reports that she was the oldest child and has 3 younger sisters and 1 brother.  Patient reports she had a very loving, active childhood.  Patient reports her mom was a traditional stay-at-home parent who shared their active.  Patient added that she had over 100 cousins and felt important she was the oldest until another family member  who had older cousins than herself.  Patient reports that her mother suffered from severe depression and recalls she had ECT at times.  Patient described herself more like her father would often be making jokes and give advice to not to over think and would say things would \"be okay\".  patient reports that her father's rahe reads \"it was fun while it lasted\".    Patient reports she has 2 adult children.  Patient reports 1 son age 53 is recovering alcoholic and has been sober for the past 5 years but reports he is \"difficult.  Patient reports ongoing tension with his alcohol use.  Patient reports ongoing issue of contention is that she refused to write a support custody letter for his daughter when she was 4 years old.  Patient reports he continues to be upset with this.  Patient reports at the time, her son refused to allow her to have contact with her grandchildren for 4 years.  " "Patient recalls that time that she \"I was a wreck\" when her son got  and refused to have contact for 4 years.  Reflected back to patient that family relationships appear to be important for her.  Patient describes she feels a \"hole in my heart\" with the ongoing tension with her son.  Patient reports she attends [facility] for support and found this has been helpful.  Patient reports she has another son aged 49 who she is close to.    Explored patient her current symptoms.  Patient notes that his feelings both irritable and short and lacking compassion for the chronic health issues with her partner.  Patient feels the Effexor is reducing her irritability and anger.  Patient expressed frustration and resentment that she does not have a \"better life\". patient reports she feels disappointed as a partner spends a lot of time on the couch due to her lack of energy.  Patient reports initially she felt her partner was not motivated or trying but is now accepting that her partners low is more due to underlying medical condition.  Patient noted that her partner has a family cottage in [place] that they would go to each year but since 2019, her partner has not had the energy to return.    Gently explored with patient that she has insight and rationale of her partner health issues but appears to be angry and frustrated at her.  Explored the difference of patient being angry and frustrated with the situation versus with her partner.  Patient reports she would need to think about that further.      Gently explored with patient the principles of acceptance and commitment therapy and discussed \"what she is trying to fix\".    Reflected back to patient that she has a caregiver and has experienced multiple losses in her life with her own children, her parents, sense of community with the move in with her partner now.  Reflected back to patient that cannot change her situation and explore her perception and interpretation that is " impacting her reaction that is causing her distress.  Encouraged patient to notice the thoughts interpretation she is making that is adding to distress.  Acknowledged with patient that her current life situation with her partner is stressful but encouraged her to explore what is causing the distress within her.    Plan    Patient felt the initial session was helpful and was open to reschedule the [facility].    [facility] will provide additional information on acceptance and commitment therapy.        Progress on Treatment Objective(s) / Homework:  Minimal progress - PREPARATION (Decided to change - considering how); Intervened by negotiating a change plan and determining options / strategies for behavior change, identifying triggers, exploring social supports, and working towards setting a date to begin behavior change    Motivational Interviewing    MI Intervention: Supported Autonomy, Collaboration, Evocation, Permission to raise concern or advise, Open-ended questions, and Reflections: simple and complex     Change Talk Expressed by the Patient: Desire to change Ability to change Reasons to change Need to change Committment to change Activation    Provider Response to Change Talk: E - Evoked more info from patient about behavior change, A - Affirmed patient's thoughts, decisions, or attempts at behavior change, R - Reflected patient's change talk, and S - Summarized patient's change talk statements  PSYCHODYMANIC PSYCHOTHERAPY: Discussed patient's emotional dynamics and issues and how they impact behaviors,Explored patient's history of relationships and how they impact present behaviors, Explored how to work with and make changes in these schemas and patterns  ACCEPTANCE AND COMMITMENT THERAPY: Explored and identified important values in patient's life, Discussed ways to commit to behavioral activation around these values    Care Plan review completed: No    Medication Review:  No changes to current psychiatric  medication(s)    Medication Compliance:  Yes    Changes in Health Issues:   None reported    Chemical Use Review:   Substance Use: Chemical use reviewed, no active concerns identified      Tobacco Use: No current tobacco use.      Assessment: Current Emotional / Mental Status (status of significant symptoms):  Risk status (Self / Other harm or suicidal ideation)  Patient denies a history of suicidal ideation, suicide attempts, self-injurious behavior, homicidal ideation, homicidal behavior, and and other safety concerns  Patient denies current fears or concerns for personal safety.  Patient denies current or recent suicidal ideation or behaviors.  Patient denies current or recent homicidal ideation or behaviors.  Patient denies current or recent self injurious behavior or ideation.  Patient denies other safety concerns.  A safety and risk management plan has not been developed at this time, however patient was encouraged to call [place] [phone] should there be a change in any of these risk factors.        Appearance:   Appropriate   Eye Contact:   Good   Psychomotor Behavior: Normal   Attitude:   Cooperative   Orientation:   All  Speech   Rate / Production: Normal/ Responsive Normal    Volume:  Normal   Mood:    Normal Sad   Affect:    Appropriate  Flat   Thought Content:  Clear   Thought Form:  Coherent  Logical   Insight:    Fair     Patient has a hearing loss.  At times, asked for clarification from [facility].    Diagnoses:  1. Current mild episode of major depressive disorder without prior episode (H24)          Collateral Reports Completed:  Routed note to PCP    Plan: (Homework, other):  Patient was given information about behavioral services and encouraged to schedule a follow up appointment with the clinic [facility]   in 2 weeks.   information about mental health symptoms and treatment options  and information on ACT -introduction and websites She  CD Recommendations: No indications of CD issues.         Natan MONTOYA  Tomi, FABIÁN  November 10, 2023

## 2023-12-07 ENCOUNTER — PATIENT OUTREACH (OUTPATIENT)
Dept: GASTROENTEROLOGY | Facility: CLINIC | Age: 73
End: 2023-12-07
Payer: COMMERCIAL

## 2023-12-18 ENCOUNTER — TELEPHONE (OUTPATIENT)
Dept: FAMILY MEDICINE | Facility: CLINIC | Age: 73
End: 2023-12-18
Payer: COMMERCIAL

## 2023-12-18 NOTE — TELEPHONE ENCOUNTER
Tested positive today for covid  Reports nasal congestion and sore throat, swollen glands x one week  Afebrile  Outside window for paxlovid tx, but pt reports sx quite mild    Recommended conservative home care treatment, recommended that her partner take a test as well - so far negative, isolating/quarantine for 5 days, and wearing a mask for 5 days after.    The patient indicates understanding of these issues and agrees with the plan.     AYDEN MathiasN, RN  [facility]      Discharge Instructions for COVID-19 Patients  You were tested for COVID-19. Your result was positive. This means you do have COVID-19. Follow the steps below. If you were tested for an upcoming treatment (procedure), you should also contact your care team for next steps.  How can I take care of myself at home?  Get lots of rest.  Drink extra fluids (unless a doctor has told you not to).  Take acetaminophen (Tylenol) for fever or pain. If you have liver or kidney problems, first ask your care team if it's safe to take acetaminophen.  Adults can take either:  650 mg (two 325 mg pills) every 4 to 6 hours as needed (but no more than 10 pills per day), OR   1,000 mg (two 500 mg pills) every 6 hours as needed (but no more than 6 pills per day).  Note: Don't take more than 3,000 mg of acetaminophen (Tylenol) in one day. Acetaminophen is found in many medicines, even over-the-counter medicines. Read all labels to be sure you don't take too much.  For children:  Check the acetaminophen (Tylenol) bottle to find out the right dose based on their age or weight.  Don't give children more than 1,625 mg of Tylenol in one day.  Know when to call 911. Emergency warning signs include:  Trouble breathing or shortness of breath  Pain or pressure in the chest that doesn't go away  Feeling confused like you haven't felt before, or not being able to wake up  Bluish-colored lips or face  If you have other health problems (like cancer,  "heart failure, an organ transplant, or severe kidney disease): Call your specialty clinic if you don't feel better in the next 2 days.  How can I protect others?  If you DO have symptoms:  Stay home and away from others (self-isolate):  For at least 5 days after your symptoms started, AND UNTIL  You've had no fever for 24 hours--without taking any medicine that reduces fever, AND  Your other symptoms (such as a cough) are better.  Wear a mask or face covering for 10 full days anytime you're around other people.  If you DON'T have symptoms:  Stay at home and away from others for   at least 5 days after your positive test.  Wear a mask or face covering for 10 full days anytime you're around other people.  If you plan to visit a clinic or hospital, please check their guidelines before you arrive--healthcare sites may have different rules. If you were tested because you're going to have surgery or another treatment, contact your care team for next steps.  If you were very ill from COVID or have a weak immune system: Stay at home and away from others for at least 10 days. Ask your doctor about other actions you should take.   During self-isolation  Stay home until it's safe to be around others.  At home, stay away from other people and pets. Or, wear a well-fitting mask when you need to be around others.  Monitor your symptoms. If you have any emergency warning signs listed at the link (such as trouble breathing, chest pain that won't go away, or confusion that's new to you), then get emergency medical care right away.  Stay in a separate room from other household members, if possible.  Use a separate bathroom, if possible.  Improve ventilation (air flow) at home, if possible.  Don't share personal household items, like cups, towels, and utensils.   When can I go back to work?  You should NOT go back to work until you meet the guidelines on page 1. (See the \"How can I protect others?\" section.) You don't need to be " "re-tested for COVID before going back to work. Studies show that you won't spread the virus if it's been at least 10 days since your symptoms started (or 20 days if you have a weak immune system).  Employers, schools, and daycares: This document serves as formal notice of medical guidelines before your employee or student can return to work or school. They must meet the guidelines in the \"How can I protect others?\" section on page 1 before going back in person.   Where can I get more information?  [facility] - About COVID-19:   [email or web address]  CDC - If You Are Sick or Caring for Someone:   www.cdc.gov/coronavirus/2019-ncov/if-you-are-sick/index.html  CDC - Isolation and Precautions for People with COVID-19:   www.cdc.gov/coronavirus/2019-ncov/your-health/isolation.html  [facility] Clinical trials (COVID-19 research studies):   clinicalaffairs.[email or web address]/covid-19-updates/[facility]-clinical-trials    For informational purposes only. Not to replace the advice of your health care provider. Clinically reviewed by Dr. Hector Ruiz. Copyright   2020 [facility]. All rights reserved. [employer] 361421 - REV 09/23.        "

## 2024-01-07 ENCOUNTER — TELEPHONE (OUTPATIENT)
Dept: GASTROENTEROLOGY | Facility: CLINIC | Age: 74
End: 2024-01-07
Payer: COMMERCIAL

## 2024-01-07 NOTE — TELEPHONE ENCOUNTER
Pre visit planning completed.      Procedure details:    Patient scheduled for Colonoscopy  on 1/18/24.     Arrival time: 1200. Procedure time 1300    Pre op exam needed? Yes.  Patient needs to complete a pre-operative exam prior to procedure.  Informed patient pre-op is needed via "[employer]" message    Facility location: [facility]; [address] [place] [address] [place] [address]    Sedation type: MAC    Indication for procedure: Screening      Chart review:     Electronic implanted devices? No    Recent diagnosis of diverticulitis within the last 6 weeks? No    Diabetic? No        Medication review:    Anticoagulants? No    NSAIDS? No NSAID medications per patient's medication list.  RN will verify with pre-assessment call.    Other medication HOLDING recommendations:  N/A      Prep for procedure:     Bowel prep recommendation: Standard Miralax   Due to:  standard bowel prep.    Prep instructions sent via [employer]     Shawna Cooper RN  Endoscopy Procedure Pre Assessment RN  [phone] option 4

## 2024-01-08 NOTE — TELEPHONE ENCOUNTER
Attempted to contact patient in order to complete pre assessment questions.     No answer. Left message to return call to [phone] option 4    Pre op exam needed? Yes.  Patient needs to complete a pre-operative exam prior to procedure.  Informed patient pre-op is needed via voicemail message and [employer] message      Theresa Eid RN  Endoscopy Procedure Pre Assessment RN

## 2024-01-10 NOTE — TELEPHONE ENCOUNTER
Second call attempt to complete pre assessment.     No answer at home or mobile number.  Left message at both numbers instructing patient to return call to [phone] #4 by next business day prior to 4PM or procedure will be sent to cancel.     Additional information needed?  Pre op exam scheduled for 1/11/24 with Mery Desai APRN CNP Ashley Mork, [facility]  Endoscopy Procedure Pre Assessment RN

## 2024-01-11 ENCOUNTER — OFFICE VISIT (OUTPATIENT)
Dept: FAMILY MEDICINE | Facility: CLINIC | Age: 74
End: 2024-01-11
Payer: COMMERCIAL

## 2024-01-11 VITALS
DIASTOLIC BLOOD PRESSURE: 82 MMHG | SYSTOLIC BLOOD PRESSURE: 130 MMHG | WEIGHT: 137.3 LBS | OXYGEN SATURATION: 97 % | TEMPERATURE: 97.2 F | HEIGHT: 62 IN | BODY MASS INDEX: 25.27 KG/M2 | RESPIRATION RATE: 21 BRPM | HEART RATE: 85 BPM

## 2024-01-11 DIAGNOSIS — G47.33 OSA (OBSTRUCTIVE SLEEP APNEA): ICD-10-CM

## 2024-01-11 DIAGNOSIS — E78.5 HYPERLIPIDEMIA LDL GOAL <130: ICD-10-CM

## 2024-01-11 DIAGNOSIS — Z85.3 PERSONAL HISTORY OF MALIGNANT NEOPLASM OF BREAST: ICD-10-CM

## 2024-01-11 DIAGNOSIS — F32.0 CURRENT MILD EPISODE OF MAJOR DEPRESSIVE DISORDER WITHOUT PRIOR EPISODE (H): ICD-10-CM

## 2024-01-11 DIAGNOSIS — R09.82 POST-NASAL DRAINAGE: ICD-10-CM

## 2024-01-11 DIAGNOSIS — Z01.818 PREOP GENERAL PHYSICAL EXAM: Primary | ICD-10-CM

## 2024-01-11 DIAGNOSIS — I10 HYPERTENSION GOAL BP (BLOOD PRESSURE) < 150/90: ICD-10-CM

## 2024-01-11 DIAGNOSIS — Z12.11 SCREEN FOR COLON CANCER: ICD-10-CM

## 2024-01-11 DIAGNOSIS — Z80.0 FAMILY HISTORY OF COLON CANCER: ICD-10-CM

## 2024-01-11 PROCEDURE — 99214 OFFICE O/P EST MOD 30 MIN: CPT | Performed by: NURSE PRACTITIONER

## 2024-01-11 RX ORDER — FLUTICASONE PROPIONATE 50 MCG
1 SPRAY, SUSPENSION (ML) NASAL DAILY
Qty: 18.2 ML | Refills: 1 | Status: SHIPPED | OUTPATIENT
Start: 2024-01-11

## 2024-01-11 RX ORDER — RESPIRATORY SYNCYTIAL VIRUS VACCINE 120MCG/0.5
0.5 KIT INTRAMUSCULAR ONCE
Qty: 1 EACH | Refills: 0 | Status: CANCELLED | OUTPATIENT
Start: 2024-01-11 | End: 2024-01-11

## 2024-01-11 RX ORDER — VENLAFAXINE HYDROCHLORIDE 37.5 MG/1
37.5 CAPSULE, EXTENDED RELEASE ORAL DAILY
Qty: 90 CAPSULE | Refills: 1 | Status: SHIPPED | OUTPATIENT
Start: 2024-01-11 | End: 2024-05-09

## 2024-01-11 ASSESSMENT — PAIN SCALES - GENERAL: PAINLEVEL: NO PAIN (0)

## 2024-01-11 NOTE — PROGRESS NOTES
[address]  [address]  [place] [address]  Phone: [phone]  Fax: [phone]  Primary Provider: Mery Desai  Pre-op Performing Provider: MERY DESAI      PREOPERATIVE EVALUATION:  Today's date: 1/11/2024    Teetee is a 73 year old, presenting for the following:  Pre-Op Exam        1/11/2024    11:20 AM   Additional Questions   Roomed by Veda GOODMAN       Surgical Information:  Surgery/Procedure: Colonoscopy  Surgery [facility]  Surgeon: Tiffanie Fountain  Surgery Date: 01/18/2024  Time of Surgery: 01:00PM  Where patient plans to recover: At home with family  Fax number for surgical facility: Note does not need to be faxed, will be available electronically in Epic.    Assessment & Plan     The proposed surgical procedure is considered LOW risk.    Preop general physical exam  Reviewed medical/social/family history and health maintenance     Screen for colon cancer  Family history of colon cancer      Post-nasal drainage  Recent URI, continues to have increased mucous production.  Discussed Mucinex x 1 week and start Flonase BID until symptoms improved and then decrease to daily.  - fluticasone (FLONASE) 50 MCG/ACT nasal spray; Spray 1 spray into both nostrils daily    Hypertension goal BP (blood pressure) < 150/90   Stable, tolerating med, no changes at this time    Current mild episode of major depressive disorder without prior episode (H24)   Stable, tolerating med, no changes at this time  - venlafaxine (EFFEXOR XR) 37.5 MG 24 hr capsule; Take 1 capsule (37.5 mg) by mouth daily    Hyperlipidemia LDL goal <130  Diet controlled    Personal history of malignant neoplasm of breast  S/p lumpectomy    HIRAM (obstructive sleep apnea)  On CPAP.              - No identified additional risk factors other than previously addressed    Antiplatelet or Anticoagulation Medication Instructions:   - Patient is on no antiplatelet or anticoagulation  medications.    Additional Medication Instructions:  Patient is to take all scheduled medications on the day of surgery    RECOMMENDATION:  APPROVAL GIVEN to proceed with proposed procedure, without further diagnostic evaluation.            Subjective       HPI related to upcoming procedure: Colonoscopy        1/11/2024    11:19 AM   Preop Questions   1. Have you ever had a heart attack or stroke? No   2. Have you ever had surgery on your heart or blood vessels, such as a stent placement, a coronary artery bypass, or surgery on an artery in your head, neck, heart, or legs? No   3. Do you have chest pain with activity? No   4. Do you have a history of  heart failure? No   5. Do you currently have a cold, bronchitis or symptoms of other infection? UNKNOWN - recent COVID infection   6. Do you have a cough, shortness of breath, or wheezing? No   7. Do you or anyone in your family have previous history of blood clots? No   8. Do you or does anyone in your family have a serious bleeding problem such as prolonged bleeding following surgeries or cuts? No   9. Have you ever had problems with anemia or been told to take iron pills? YES - Most recent CBC normal   10. Have you had any abnormal blood loss such as black, tarry or bloody stools, or abnormal vaginal bleeding? No   11. Have you ever had a blood transfusion? No   12. Are you willing to have a blood transfusion if it is medically needed before, during, or after your surgery? Yes   13. Have you or any of your relatives ever had problems with anesthesia? YES - PONV   14. Do you have sleep apnea, excessive snoring or daytime drowsiness? YES - HIRAM   14a. Do you have a CPAP machine? Yes   15. Do you have any artifical heart valves or other implanted medical devices like a pacemaker, defibrillator, or continuous glucose monitor? No   16. Do you have artificial joints? No   17. Are you allergic to latex? No       Health Care Directive:  Patient does not have a Health Care  Directive or Living Will: Patient states has Advance Directive and will bring in a copy to clinic.    Preoperative Review of :   reviewed - no record of controlled substances prescribed.          Review of Systems  CONSTITUTIONAL: NEGATIVE for fever, chills, change in weight  ENT/MOUTH: NEGATIVE for ear, mouth and throat problems  RESP: NEGATIVE for significant cough or SOB  CV: NEGATIVE for chest pain, palpitations or peripheral edema    Patient Active Problem List    Diagnosis Date Noted    Hypertension goal BP (blood pressure) < 150/90 05/14/2020     Priority: Medium    HIRAM (obstructive sleep apnea) 01/29/2020     Priority: Medium     1/28/2020 [facility] Sleep Study (157.0 lbs) - AHI 30.4, RDI 32.3, Supine AHI 32.3, REM .0, Low O2% 72.4%, Time Spent ?88% 25.9, Time Spent ?89% 44.6. Treatment was titrated to a pressure of CPAP 6 with an AHI -. Time spent in REM supine at this pressure was - minutes.      Chronic fatigue 10/03/2017     Priority: Medium    Need for hepatitis C screening test 10/03/2017     Priority: Medium    Pelvic relaxation 07/05/2016     Priority: Medium    Family history of colon cancer 07/05/2016     Priority: Medium    Skin lesion of left arm 07/05/2016     Priority: Medium    Hearing loss in right ear 03/07/2016     Priority: Medium     since childhood.       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     Priority: Medium    Vitamin D deficiency 06/06/2013     Priority: Medium    Impaired fasting blood sugar 06/05/2013     Priority: Medium    Intraductal carcinoma, noninfiltrating 04/03/2013     Priority: Medium     Noted by cancer registry, I'm not sure who is following her, see scan 4/3/2013       Environmental allergies 02/17/2012     Priority: Medium    Overweight (BMI 25.0-29.9) 01/20/2012     Priority: Medium    Hypercholesterolemia 02/18/2011     Priority:  Medium     [facility] 3/18/2010 , , HDL 37,       Hyperlipidemia LDL goal <130 02/18/2011     Priority: Medium     Spencer risk 5% intermediate age 60      Advance Care Planning 01/19/2011     Priority: Medium     Advance Care Planning: Initial facilitation introduction:   Teetee Dalal presented for initial session regarding ACP at a group session. She was accompanied by no one. Honoring Choices information provided and resources reviewed. She currently wishes to give additional consideration to ACP prior to documenting choices.  She currently has the following questions or concerns about Advance Care Planning: none.  Confirmed/documented designated decision maker(s). See permanent comments section of demographics in clinical tab. Added by Elvia Groves on 3/5/2015  1/19/11  We discussed her existing Health Care Directive drawn up by her  .  Patient was given additional materials  on CPR ,Tube feeding, and ventilator information Ppatient has decided to review the information and revised her existing document.  Patient was given the option to return for another visit with me to finalize document with her healthcare agents or to return the completed document for abstraction.  Perla Bishop RN  Chronic Care Coordinator          Skin lesion of chest wall 11/18/2010     Priority: Medium    Skin lesion of right arm 11/18/2010     Priority: Medium    Hx of colonic polyps 04/22/2010     Priority: Medium    Personal history of malignant neoplasm of breast 04/22/2010     Priority: Medium    Other specified genital prolapse(618.89) 01/16/2006     Priority: Medium      Past Medical History:   Diagnosis Date    Breast cancer (H) 9/10/96    Change in stool habits 7/5/2016    Elevated blood pressure 1/20/2012    Elevated blood pressure reading without diagnosis of hypertension 10/3/2017    Encounter for screening colonoscopy 6/17/2011    High risk, recommended every 5 years, see  "scan 11, nromal  (Problem list name updated by automated process. Provider to review and confirm.)    Hearing loss     Hyperlipidemia LDL goal <130 2011    Gilbert risk 5% intermediate age 60     Hypertension     On occasion    Intraductal carcinoma, noninfiltrating 4/3/2013    Noted by cancer registry, I'm not sure who is following her, see scan 4/3/2013     Nasal congestion     Screening colonoscopy 2011     Past Surgical History:   Procedure Laterality Date    HYSTERECTOMY TOTAL ABDOMINAL, BILATERAL SALPINGO-OOPHORECTOMY, COMBINED  2004    lumpectomy left breast - CA    LUMPECTOMY BREAST      SURGICAL HISTORY OF -       hyperplastic colonic polyps - benign     Current Outpatient Medications   Medication Sig Dispense Refill    Cyanocobalamin (VITAMIN B-12 PO)       losartan (COZAAR) 50 MG tablet Take 1 tablet (50 mg) by mouth daily 90 tablet 1    red yeast rice 600 MG CAPS Take 600 mg by mouth daily      venlafaxine (EFFEXOR XR) 37.5 MG 24 hr capsule Take 1 capsule (37.5 mg) by mouth daily 90 capsule 1       Allergies   Allergen Reactions    No Known Drug Allergy         Social History     Tobacco Use    Smoking status: Former     Packs/day: 0.25     Years: 5.00     Additional pack years: 0.00     Total pack years: 1.25     Types: Cigarettes     Quit date: 2000     Years since quittin.0    Smokeless tobacco: Never   Substance Use Topics    Alcohol use: Yes     Alcohol/week: 2.0 standard drinks of alcohol     Comment: moderate       History   Drug Use No         Objective     BP (!) 143/81 (BP Location: Right arm, Patient Position: Sitting, Cuff Size: Adult Regular)   Pulse 85   Temp 97.2  F (36.2  C) (Temporal)   Resp 21   Ht 1.57 m (5' 1.81\")   Wt 62.3 kg (137 lb 4.8 oz)   LMP  (LMP Unknown)   SpO2 97%   BMI 25.27 kg/m      Physical Exam  GENERAL APPEARANCE: healthy, alert and no distress  HENT: ear canals and TM's normal and nose and mouth without ulcers or " lesions  RESP: lungs clear to auscultation - no rales, rhonchi or wheezes  CV: regular rate and rhythm, normal S1 S2, no S3 or S4 and no murmur, click or rub   ABDOMEN: soft, nontender, no HSM or masses and bowel sounds normal  NEURO: Normal strength and tone, sensory exam grossly normal, mentation intact and speech normal    Recent Labs   Lab Test 10/19/23  1038 05/25/22  0904   HGB 14.1 14.4    214    144   POTASSIUM 3.7 3.5   CR 0.68 0.61   A1C 5.8* 5.6        Diagnostics:  No labs were ordered during this visit.   No EKG required for low risk surgery (cataract, skin procedure, breast biopsy, etc).    Revised Cardiac Risk Index (RCRI):  The patient has the following serious cardiovascular risks for perioperative complications:   - No serious cardiac risks = 0 points     RCRI Interpretation: 0 points: Class I (very low risk - 0.4% complication rate)         Signed Electronically by: ULISES Jeter CNP  Copy of this evaluation report is provided to requesting physician.

## 2024-01-11 NOTE — PATIENT INSTRUCTIONS
Preparing for Your Surgery  Getting started  A nurse will call you to review your health history and instructions. They will give you an arrival time based on your scheduled surgery time. Please be ready to share:  Your doctor's clinic name and phone number  Your medical, surgical, and anesthesia history  A list of allergies and sensitivities  A list of medicines, including herbal treatments and over-the-counter drugs  Whether the patient has a legal guardian (ask how to send us the papers in advance)  Please tell us if you're pregnant--or if there's any chance you might be pregnant. Some surgeries may injure a fetus (unborn baby), so they require a pregnancy test. Surgeries that are safe for a fetus don't always need a test, and you can choose whether to have one.   If you have a child who's having surgery, please ask for a copy of Preparing for Your Child's Surgery.    Preparing for surgery  Within 10 to 30 days of surgery: Have a pre-op exam (sometimes called an H&P, or History and Physical). This can be done at a clinic or pre-operative center.  If you're having a , you may not need this exam. Talk to your care team.  At your pre-op exam, talk to your care team about all medicines you take. If you need to stop any medicines before surgery, ask when to start taking them again.  We do this for your safety. Many medicines can make you bleed too much during surgery. Some change how well surgery (anesthesia) drugs work.  Call your insurance company to let them know you're having surgery. (If you don't have insurance, call [phone].)  Call your clinic if there's any change in your health. This includes signs of a cold or flu (sore throat, runny nose, cough, rash, fever). It also includes a scrape or scratch near the surgery site.  If you have questions on the day of surgery, call your hospital or surgery center.  Eating and drinking guidelines  For your safety: Unless your surgeon tells you otherwise,  follow the guidelines below.  Eat and drink as usual until 8 hours before you arrive for surgery. After that, no food or milk.  Drink clear liquids until 2 hours before you arrive. These are liquids you can see through, like water, Gatorade, and Propel Water. They also include plain black coffee and tea (no cream or milk), candy, and breath mints. You can spit out gum when you arrive.  If you drink alcohol: Stop drinking it the night before surgery.  If your care team tells you to take medicine on the morning of surgery, it's okay to take it with a sip of water.  Preventing infection  Shower or bathe the night before and morning of your surgery. Follow the instructions your clinic gave you. (If no instructions, use regular soap.)  Don't shave or clip hair near your surgery site. We'll remove the hair if needed.  Don't smoke or vape the morning of surgery. You may chew nicotine gum up to 2 hours before surgery. A nicotine patch is okay.  Note: Some surgeries require you to completely quit smoking and nicotine. Check with your surgeon.  Your care team will make every effort to keep you safe from infection. We will:  Clean our hands often with soap and water (or an alcohol-based hand rub).  Clean the skin at your surgery site with a special soap that kills germs.  Give you a special gown to keep you warm. (Cold raises the risk of infection.)  Wear special hair covers, masks, gowns and gloves during surgery.  Give antibiotic medicine, if prescribed. Not all surgeries need antibiotics.  What to bring on the day of surgery  Photo ID and insurance card  Copy of your health care directive, if you have one  Glasses and hearing aids (bring cases)  You can't wear contacts during surgery  Inhaler and eye drops, if you use them (tell us about these when you arrive)  CPAP machine or breathing device, if you use them  A few personal items, if spending the night  If you have . . .  A pacemaker, ICD (cardiac defibrillator) or other  implant: Bring the ID card.  An implanted stimulator: Bring the remote control.  A legal guardian: Bring a copy of the certified (court-stamped) guardianship papers.  Please remove any jewelry, including body piercings. Leave jewelry and other valuables at home.  If you're going home the day of surgery  You must have a responsible adult drive you home. They should stay with you overnight as well.  If you don't have someone to stay with you, and you aren't safe to go home alone, we may keep you overnight. Insurance often won't pay for this.  After surgery  If it's hard to control your pain or you need more pain medicine, please call your surgeon's office.  Questions?   If you have any questions for your care team, list them here: _________________________________________________________________________________________________________________________________________________________________________ ____________________________________ ____________________________________ ____________________________________  For informational purposes only. Not to replace the advice of your health care provider. Copyright   2003, 2019 [facility]. All rights reserved. Clinically reviewed by Gracie Sni MD. [employer]ART[employer] 926658 - REV 12/22.    How to Take Your Medication Before Surgery  - Take all of your medications before surgery as usual

## 2024-01-11 NOTE — TELEPHONE ENCOUNTER
Patient completed Pre op 1/11/24.    Pre assessment completed for upcoming procedure.    Procedure details:    Arrival time and facility location reviewed.    Pre op exam needed? N/A    Designated  policy reviewed. Instructed to have someone stay 24 hours post procedure.     COVID policy reviewed.      Medication review:    Medications reviewed. Please see supporting documentation below. Holding recommendations discussed (if applicable).       Prep for procedure:     Reviewed procedure prep instructions.     Patient verbalized understanding and had no questions or concerns at this time.        Corinne Kliber, RN  Endoscopy Procedure Pre Assessment RN  [phone] option 4

## 2024-01-18 ENCOUNTER — HOSPITAL ENCOUNTER (OUTPATIENT)
Facility: CLINIC | Age: 74
Discharge: HOME | End: 2024-01-18
Attending: COLON & RECTAL SURGERY | Admitting: COLON & RECTAL SURGERY
Payer: COMMERCIAL

## 2024-01-18 VITALS
RESPIRATION RATE: 33 BRPM | DIASTOLIC BLOOD PRESSURE: 73 MMHG | SYSTOLIC BLOOD PRESSURE: 123 MMHG | HEIGHT: 62 IN | BODY MASS INDEX: 25.21 KG/M2 | WEIGHT: 137 LBS | HEART RATE: 86 BPM | OXYGEN SATURATION: 96 %

## 2024-01-18 LAB — COLONOSCOPY: NORMAL

## 2024-01-18 PROCEDURE — 250N000011 HC RX IP 250 OP 636: Performed by: COLON & RECTAL SURGERY

## 2024-01-18 PROCEDURE — G0500 MOD SEDAT ENDO SERVICE >5YRS: HCPCS | Performed by: COLON & RECTAL SURGERY

## 2024-01-18 PROCEDURE — 88305 TISSUE EXAM BY PATHOLOGIST: CPT | Mod: TC | Performed by: COLON & RECTAL SURGERY

## 2024-01-18 PROCEDURE — 88305 TISSUE EXAM BY PATHOLOGIST: CPT | Mod: 26 | Performed by: PATHOLOGY

## 2024-01-18 PROCEDURE — 45385 COLONOSCOPY W/LESION REMOVAL: CPT | Mod: PT | Performed by: COLON & RECTAL SURGERY

## 2024-01-18 RX ORDER — FENTANYL CITRATE 50 UG/ML
INJECTION, SOLUTION INTRAMUSCULAR; INTRAVENOUS PRN
Status: DISCONTINUED | OUTPATIENT
Start: 2024-01-18 | End: 2024-01-18 | Stop reason: HOSPADM

## 2024-01-18 RX ORDER — ONDANSETRON 2 MG/ML
4 INJECTION INTRAMUSCULAR; INTRAVENOUS
Status: DISCONTINUED | OUTPATIENT
Start: 2024-01-18 | End: 2024-01-18 | Stop reason: HOSPADM

## 2024-01-18 RX ORDER — LIDOCAINE 40 MG/G
CREAM TOPICAL
Status: DISCONTINUED | OUTPATIENT
Start: 2024-01-18 | End: 2024-01-18 | Stop reason: HOSPADM

## 2024-01-18 ASSESSMENT — ACTIVITIES OF DAILY LIVING (ADL): ADLS_ACUITY_SCORE: 35

## 2024-01-18 NOTE — H&P
History and physical examination reviewed  Will proceed with surveillance colonoscopy    WILEY TRUJILLO MD

## 2024-01-19 LAB
PATH REPORT.COMMENTS IMP SPEC: NORMAL
PATH REPORT.COMMENTS IMP SPEC: NORMAL
PATH REPORT.FINAL DX SPEC: NORMAL
PATH REPORT.GROSS SPEC: NORMAL
PATH REPORT.MICROSCOPIC SPEC OTHER STN: NORMAL
PATH REPORT.RELEVANT HX SPEC: NORMAL
PHOTO IMAGE: NORMAL

## 2024-05-09 ENCOUNTER — OFFICE VISIT (OUTPATIENT)
Dept: FAMILY MEDICINE | Facility: CLINIC | Age: 74
End: 2024-05-09
Payer: COMMERCIAL

## 2024-05-09 VITALS
RESPIRATION RATE: 20 BRPM | SYSTOLIC BLOOD PRESSURE: 138 MMHG | OXYGEN SATURATION: 96 % | HEART RATE: 67 BPM | TEMPERATURE: 98.4 F | WEIGHT: 136.2 LBS | BODY MASS INDEX: 24.91 KG/M2 | DIASTOLIC BLOOD PRESSURE: 88 MMHG

## 2024-05-09 DIAGNOSIS — J38.3 STRAIN OF VOCAL CORDS: Primary | ICD-10-CM

## 2024-05-09 DIAGNOSIS — F32.0 CURRENT MILD EPISODE OF MAJOR DEPRESSIVE DISORDER WITHOUT PRIOR EPISODE (H): ICD-10-CM

## 2024-05-09 PROCEDURE — 99213 OFFICE O/P EST LOW 20 MIN: CPT | Performed by: NURSE PRACTITIONER

## 2024-05-09 RX ORDER — RESPIRATORY SYNCYTIAL VIRUS VACCINE 120MCG/0.5
0.5 KIT INTRAMUSCULAR ONCE
Qty: 1 EACH | Refills: 0 | Status: CANCELLED | OUTPATIENT
Start: 2024-05-09 | End: 2024-05-09

## 2024-05-09 RX ORDER — VENLAFAXINE HYDROCHLORIDE 37.5 MG/1
37.5 CAPSULE, EXTENDED RELEASE ORAL DAILY
Qty: 90 CAPSULE | Refills: 1 | Status: SHIPPED | OUTPATIENT
Start: 2024-05-09

## 2024-05-09 ASSESSMENT — PATIENT HEALTH QUESTIONNAIRE - PHQ9
SUM OF ALL RESPONSES TO PHQ QUESTIONS 1-9: 3
SUM OF ALL RESPONSES TO PHQ QUESTIONS 1-9: 3
10. IF YOU CHECKED OFF ANY PROBLEMS, HOW DIFFICULT HAVE THESE PROBLEMS MADE IT FOR YOU TO DO YOUR WORK, TAKE CARE OF THINGS AT HOME, OR GET ALONG WITH OTHER PEOPLE: NOT DIFFICULT AT ALL

## 2024-05-09 ASSESSMENT — PAIN SCALES - GENERAL: PAINLEVEL: NO PAIN (0)

## 2024-05-09 NOTE — PROGRESS NOTES
"  Assessment & Plan     Strain of vocal cords  Referral to SLP.  Encouraged continued use of Flonase as I suspect postnasal drainage could be contributing.    - Speech Therapy  Referral; Future    Current mild episode of major depressive disorder without prior episode (H24)  Symptoms are well-controlled.  Patient was wondering if she can discontinue this medication.  Recommended she continue as she is in [place], however, we certainly can reevaluate now that her situational stressors have improved.  - venlafaxine (EFFEXOR XR) 37.5 MG 24 hr capsule; Take 1 capsule (37.5 mg) by mouth daily      The longitudinal plan of care for the diagnosis(es)/condition(s) as documented were addressed during this visit. Due to the added complexity in care, I will continue to support Teetee in the subsequent management and with ongoing continuity of care.        BMI  Estimated body mass index is 24.91 kg/m  as calculated from the following:    Height as of 1/18/24: 1.575 m (5' 2\").    Weight as of this encounter: 61.8 kg (136 lb 3.2 oz).             Shalom Kingsley is a 73 year old, presenting for the following health issues:  Mass (/In the throat, on-going for a couple months, spoken with PCP before)    History of Present Illness       Reason for visit:  Lump in throat    She eats 2-3 servings of fruits and vegetables daily.She consumes 0 sweetened beverage(s) daily.She exercises with enough effort to increase her heart rate 10 to 19 minutes per day.  She exercises with enough effort to increase her heart rate 3 or less days per week.   She is taking medications regularly.     Continues to have the chronic voice clearing/feeling like there is a lump in her throat.  Also post nasal, but Flonase didn't help.  Would like referral to Shauna Muse, SLP at [facility]    The antidepressant was a miracle.  Wondering if she should come off it.          Objective    /88 (BP Location: Right arm, Patient Position: " Sitting, Cuff Size: Adult Regular)   Pulse 67   Temp 98.4  F (36.9  C) (Temporal)   Resp 20   Wt 61.8 kg (136 lb 3.2 oz)   LMP  (LMP Unknown)   SpO2 96%   BMI 24.91 kg/m    Body mass index is 24.91 kg/m .  Physical Exam               Signed Electronically by: ULISES Jeter CNP

## 2024-05-13 DIAGNOSIS — I10 HYPERTENSION GOAL BP (BLOOD PRESSURE) < 150/90: ICD-10-CM

## 2024-05-13 RX ORDER — LOSARTAN POTASSIUM 50 MG/1
50 TABLET ORAL DAILY
Qty: 90 TABLET | Refills: 0 | Status: SHIPPED | OUTPATIENT
Start: 2024-05-13 | End: 2024-06-14

## 2024-06-05 ENCOUNTER — MYC MEDICAL ADVICE (OUTPATIENT)
Dept: FAMILY MEDICINE | Facility: CLINIC | Age: 74
End: 2024-06-05
Payer: COMMERCIAL

## 2024-06-07 NOTE — TELEPHONE ENCOUNTER
"Called patient to triage.  Pt states she began \"feeling much better\" upon waking yesterday morning.  \"Still stuffed up,\" fever has resolved.  Pt does not want Paxlovid at this time and states she is past the 5 day window.    Instructed patient to call clinic with any new or worsening symptom and patient verbalized understanding.    AYDEN ArzolaN, PHN, RN  [facility]  [phone]        "

## 2024-06-14 ENCOUNTER — MYC REFILL (OUTPATIENT)
Dept: FAMILY MEDICINE | Facility: CLINIC | Age: 74
End: 2024-06-14
Payer: COMMERCIAL

## 2024-06-14 DIAGNOSIS — I10 HYPERTENSION GOAL BP (BLOOD PRESSURE) < 150/90: ICD-10-CM

## 2024-06-14 RX ORDER — LOSARTAN POTASSIUM 50 MG/1
50 TABLET ORAL DAILY
Qty: 90 TABLET | Refills: 0 | Status: SHIPPED | OUTPATIENT
Start: 2024-06-14 | End: 2024-09-09

## 2024-07-16 ENCOUNTER — MYC REFILL (OUTPATIENT)
Dept: FAMILY MEDICINE | Facility: CLINIC | Age: 74
End: 2024-07-16
Payer: COMMERCIAL

## 2024-07-16 DIAGNOSIS — F32.0 CURRENT MILD EPISODE OF MAJOR DEPRESSIVE DISORDER WITHOUT PRIOR EPISODE (H): ICD-10-CM

## 2024-07-16 RX ORDER — VENLAFAXINE HYDROCHLORIDE 37.5 MG/1
37.5 CAPSULE, EXTENDED RELEASE ORAL DAILY
Qty: 90 CAPSULE | Refills: 1 | OUTPATIENT
Start: 2024-07-16

## 2024-08-12 ENCOUNTER — TELEPHONE (OUTPATIENT)
Dept: OTOLARYNGOLOGY | Facility: CLINIC | Age: 74
End: 2024-08-12
Payer: COMMERCIAL

## 2024-08-12 NOTE — TELEPHONE ENCOUNTER
FUTURE VISIT INFORMATION      FUTURE VISIT INFORMATION:  Date: 8/15/24  Time: 1:00pm  [place] [facility] [place] INFORMATION:  Referring provider:  Mery Desai APRN CNP   Referring providers clinic:  [facility]   Reason for visit/diagnosis  Problems With Voice Production    RECORDS REQUESTED FROM:       Clinic name Comments Records Status Imaging Status   [facility] Ov/referral 5/9/24 EPIC

## 2024-08-15 ENCOUNTER — PRE VISIT (OUTPATIENT)
Dept: OTOLARYNGOLOGY | Facility: CLINIC | Age: 74
End: 2024-08-15

## 2024-08-15 ENCOUNTER — OFFICE VISIT (OUTPATIENT)
Dept: OTOLARYNGOLOGY | Facility: CLINIC | Age: 74
End: 2024-08-15
Attending: NURSE PRACTITIONER
Payer: COMMERCIAL

## 2024-08-15 DIAGNOSIS — R49.0 DYSPHONIA: Primary | ICD-10-CM

## 2024-08-15 DIAGNOSIS — J38.3 STRAIN OF VOCAL CORDS: ICD-10-CM

## 2024-08-15 PROCEDURE — 92507 TX SP LANG VOICE COMM INDIV: CPT | Mod: GN | Performed by: SPEECH-LANGUAGE PATHOLOGIST

## 2024-08-15 PROCEDURE — 92524 BEHAVRAL QUALIT ANALYS VOICE: CPT | Mod: GN | Performed by: SPEECH-LANGUAGE PATHOLOGIST

## 2024-08-15 PROCEDURE — 31579 LARYNGOSCOPY TELESCOPIC: CPT | Performed by: SPEECH-LANGUAGE PATHOLOGIST

## 2024-08-15 PROCEDURE — 92520 LARYNGEAL FUNCTION STUDIES: CPT | Mod: GN | Performed by: SPEECH-LANGUAGE PATHOLOGIST

## 2024-08-15 NOTE — PATIENT INSTRUCTIONS
"After Visit Summary    Patient: Teetee Dalal  Date of Visit: 8/15/2024    These notes are also available in your MyChart. Please take a few moments to find them under \"Past Appointments\" in the [employer] system, as Shauna will start to phase out e-mail communications.    \"Handouts\" that go along with today's order of activities include (below):   Frequency of practice: 2-3x/day unless marked otherwise    Order of today's appointment:      Irritable Larynx Syndrome    What is Irritable Larynx Syndrome?  Irritable Larynx Syndrome (ILS) is a cluster of symptoms not associated with a specific disease process. Individuals with ILS can have any combination of the following complaints:      Globus sensation (feeling of lump or some other sensation in the throat)  Throat irritation or burning sensation  Tightness of throat or neck  Chronic cough or throat clearing; sensation of need to clear throat  Effort or pain with swallowing  Paradoxical vocal fold motion (sensation of difficulty inhaling)  Laryngospasm (tightening of throat causing choking or difficulty inhaling)    What causes ILS?  ILS works in the same way as a mosquito bite: We might scratch the bite absentmindedly a couple of times, and suddenly we realize the bite really itches!  So we scratch it vigorously because that feels better for a few moments. In the long run though, scratching actually makes the itch worse.  In the same way, when individuals experience mild irritation in their throats for some reason, they might not realize that they've begun  scratching  the  itch,  (throat clearing) but over time the irritation worsens and becomes more noticeable.  This is how earlier, milder symptoms can be the precursors to more-severe symptoms. Chronic irritation of the mucosa in the laryngeal area can cause changes to the nerve pathways; they can become hyper-excitable, so that it only takes a small irritation to have a large sensory response (like a cough).  It's " nice that the nervous system can learn, but in this case it has backfired!    Here are some possible irritants that can start the chain reaction (most individuals have more than one):  Upper respiratory infection with cough  Acid Reflux  Post Nasal Drainage  Allergens (e.g. tree, mold, pollen, pet dander)  Cigarette smoke or other kinds of smoke  Odors (e.g. perfume, hairspray)  Food sensitivities  Strong Emotions (e.g. anxiety, stress)  Hyperfunction of the muscles of the vocal mechanism  Harsh chemicals/  Cold Air    Evaluation of ILS  At the [facility], an otolaryngologist and a speech-language pathologist work as a team to determine if ILS is a problem.  Medical evaluation and laryngeal examination rule out any other cause for the symptoms.  Some medical treatment may be advised.  Functional evaluation determines whether there are behavioral or lifestyle factors that are contributing to the symptoms.      Treatment of ILS  Treatment of ILS addresses the cause of irritation. This can include:   Treatment of Acid Reflux This may include: Medications (what your MD prescribes), Dietary Precautions (what you eat and what supplements you take), Lifestyle Precautions (when you eat, avoiding environmental irritants), and Mechanical Precautions (how much pressure you put on your lower esophageal sphincter).  Functional Speech Therapy with a Speech-Language Pathologist (SLP). Your SLP will educate you about the disorder, help you improve the environment of your mucosa to reduce irritation, improve the movement and function of your larynx, and help reduce muscle tension and restore muscle balance.  Further Medical treatment is sometimes used to restore the medical basis for normal function and sensation.  Your MD will discuss these possibilities with you if they are relevant.      Cough and Throat Clearing  The biological purpose of the larynx is to protect the airway (trachea and lungs). Coughing and  throat clearing are mechanisms that are meant to assist in the protection of our airway. When we feel something such as liquid, food, saliva, dust, etc. near our vocal folds, we will clear our throats and cough as a protective reflex. We also cough or throat clear if our airway is irritated.     Why can chronic coughing and throat clearing be harmful?  A cough is produced by squeezing the vocal folds together, building up pressure in the lungs, and then quickly forcing the vocal folds open to clear away whatever might be getting too close to our airway. This can be traumatic to the vocal folds, irritating them in the same way that our hands would be irritated if they were being slapped together over and over. Coughing for a long period of time can cause the mucosa of the larynx and vocal folds to become hypersensitive, making it feel like something is threatening the airway.  The vocal folds need to cough or throat clear even when there isn't an actual physical threat to the airway.  The chronic coughing or throat clearing results in even more coughing or throat clearing.      Strategies to Reduce Irritation  Your speech-language pathologist will teach you techniques to use muscles optimally, in order to reduce irritation.  In the meantime, you can start reducing the irritation, using the following strategies:    Identifying your unique trigger; take steps to avoid it  Improve both systemic and topical (surface) hydration (dry mucosa is more easily irritated)  Drink adequate fluids   Use a humidifier, especially in the bedroom at night  Guaifenesin (e.g. Mucinex) to thin viscous secretions  Sucking on candy, or cough drops without menthol, or chew gum, to increase salivary stimulation    Alternative Behaviors to coughing/ for irritation/ for globus  Swallow hard  Stimulate your oral cavity:   Sip hot, cold, carbonated, or flavored water  Suck on ice chips  Bite your tongue or cheek (not too hard!)  Massage under  "your chin + pull down gently and swallow.   Gargle: AM and PM (1,2,&3), also after meals (1&2 only)      Gentle hums or vocal exercises taught to you by your SLP  Say  eh eh eh eh  silently (for a gentle, non-irritating throat-clear)  Sniff or pursed lip inhale and exhale on  Shhh  like shushing a baby & swallow  Sniff or pursed lip inhale and exhale on  zzz  like a buzzing bee  Wait. While you are waiting, try the above behaviors instead       Cup and Bubble Exercises   WHY: Though these exercises seems (and feels) silly they are helpful for a number of reasons. First, they make you use your air generously and consistently, helping you to coordinate your breath and your voice. Second, they lengthen and narrow the vocal tract with the straw. This narrowing (or semi-occlusion in scientific terms) creates back pressure in your throat which has been shown to help the vocal folds vibrate more easily and reduce how hard some of the other muscles are squeezing.   HOW:   With Water - Fill the cup (or bottle) about 2 inches full of water. Blow bubbles through the straw while keeping your voice on. (kind of like making an \"ooooo\" sound through straw).Keep the water bubbling the whole time. That means your air is moving consistently.   Without Water - make sound through the straw (like it's a kazoo). Make sure you are using your air freely. You can hold your hand in front of the straw to test, and you should be able to feel the air moving.   Use the \"w\" sound without the straw. Let your cheeks puff up slightly (like Kvng Persaud). Maintain the relaxed feeling in your throat while focusing on a sense of buzz at your lips.   After easy sounds listed below speak through the straw (with or without water) using every day phrases and counting to help bridge between the exercises and everyday voice use.   Feel how open and relaxed your throat feels when you practice these sounds. If the bubbling or air stops or it gets tight, " don't sweat it. Just breath, relax, and start again. Across all the exercises make sure you are getting a nice low breath and feeling the steady inward motion of low abdominal muscles when making sound.   Practice 5 times a day for no more than 3 minutes, and whenever you feel fatigued.   Aren't sure if you are doing it right? Ask yourself these three questions:   1. Does it feel easy?   2. Are the bubbles and voice consistent?   3. Do you feel that forward buzz?     What sounds to make:   Single pitches - use any comfortable pitch and sustain the note for as long as it feels free and easy, and your lips or tongue are bubbling.   Sighs - glide from high to low like you are sitting down in a comfortable chair after a long day of work   [place] - Start on a medium pitch and glide up just a bit and back down   On and off - use a comfortable pitch near where you speak. Keep the bubbles moving consistently while turning the voice on and off. Recognize how little work you need to do to get the voice working.         Breathing:   In the morning and evening (twice daily) for 2-5 minutes:   Breathe while lying on your back with your face and knees up. Hands on tummy and chest.  Take a breath in with rounded lips and exhale with a  shhhhh    Inhale  = Inflate; exhale = deflate  3x each: try breathin in/8 out, 3/4  Throughout the day (2-3x/day for just a couple minutes) check breathing while keeping shoulders relaxed (riding to and from school, etc.)  Pulsed breathing Exercise (2-3x/day):   3x each (leaning forward  or hands behind the back to keep shoulders down and chest relaxed).  Sh, Sh, Sh...  S, S, S...  F, F, F.....   (tummy IN for each sound)  Shh exercise (loudness) : 1) one on one loudness 2) 2-3 people 3) 5-6 people loudness.  Feel breath in the abdominal area and not in the throat.           Shauna Muse M.M. (voice), M.A., CCC/SLP  Speech-Language Pathologist  [facility] Certified Vocologist  [facility]  Castillo [email or web address]  she/her

## 2024-08-15 NOTE — LETTER
"8/15/2024       RE: Teetee Dalal  [address]  [facility] [address]     Dear Colleague,    Thank you for referring your patient, Teetee Dalal, to the [facility] [place] at [facility]. Please see a copy of my visit note below.      [facility]  [facility] - Dept. of Otolaryngology   Evaluation report - IN PERSON APPOINTMENT    Clinician: OFELIA Contreras.SIM, MJenniferMJennifer (voice), M.A., CCC-SLP  Referring physician:  Lloyd  Patient: Teetee Dalal  Date of Visit: 8/15/2024  # of Visits: 1  # of Therapy sessions: 1    Benefit & Certification period:   [facility] MEDICARE ADVANTAGE  Certification date from: 8/15/24  Certification date to: 24    HISTORY    Chief complaint: Mulugeta is a 73 year old presenting today for evaluation of voice and throat concerns.      Salient history: She has a history significant for dysphonia.  She is friends with a former patient of mine, Jing Rahman, who referred her to our clinic for therapy.    CURRENT SYMPTOMS INCLUDE  VOICE:   \"Strained vocal cords\"  10 yrs ago she got sick and it was after her father  and she was sick for months  Slowly recovered, and had a congestion in the throat feeling  Underwent a lot of testing, and then it  improved.  Intermittent  Currently, she can feel it in the throat  Comes and goes in severity  Fluticazone in the AM seems to help and improve, but can be better anyway - so not compeltely sure  No hearing in right ear and hearing aid in left ear  Appt with hearing doctor., but when she has yher aid in she finds that she can hear herself and she will start to straining  Partner is also [place] and so they are both straining.   Has found that her voice quality is better when she is not wearing her hearing aids.  Part of the problem and mucus in the throat sensation.     THROAT ISSUES:   Globus sensation  Occasional coughing, no throat clearing feels higher in " the throat.     RESPIRATION:   Ho hx of asthma  Lost    SWALLOWING:   No issues    ADDITIONAL:  She denies Hx of reflux  And they said start with allergies and fluticazone.   Lost 15 lbs when had covid  Colonoscopy prior to that time, and she felt better and seemed to help her. She decided to do intermittent fasting for a while, and is now more active.   Bilateral [place]    OTHER PERTINENT HISTORY  Radiation treatment for breast cancer, has been clear since that time. No voice or breathing changes around that time.  Past Medical History:   Diagnosis Date     Breast cancer (H) 9/10/96     Change in stool habits 7/5/2016     Elevated blood pressure 1/20/2012     Elevated blood pressure reading without diagnosis of hypertension 10/3/2017     Encounter for screening colonoscopy 6/17/2011    High risk, recommended every 5 years, see scan 6/6/11, nromal  (Problem list name updated by automated process. Provider to review and confirm.)     Hearing loss      Hyperlipidemia LDL goal <130 2/18/2011    [place] risk 5% intermediate age 60      Hypertension     On occasion     Intraductal carcinoma, noninfiltrating 4/3/2013    Noted by cancer registry, I'm not sure who is following her, see scan 4/3/2013      Nasal congestion      Screening colonoscopy 6/17/2011     Past Surgical History:   Procedure Laterality Date     CATARACT EXTRACTION       COLONOSCOPY N/A 1/18/2024    Procedure: Colonoscopy;  Surgeon: Tiffanie Fountain MD;  Location: [facility]     HYSTERECTOMY TOTAL ABDOMINAL, BILATERAL SALPINGO-OOPHORECTOMY, COMBINED  02/18/2004    lumpectomy left breast - CA     LUMPECTOMY BREAST  1996     SURGICAL HISTORY OF -   09/2003    hyperplastic colonic polyps - benign       OBJECTIVE  PATIENT REPORTED MEASURES    Patient Supplied Answers To VHI Questionnaire      8/15/2024     1:00 PM   Voice Handicap Index (VHI-10)   My voice makes it difficult for people to hear me 2   People have difficulty understanding me in a noisy room 2  "  My voice difficulties restrict my personal and social life.  2   I feel left out of conversations because of my voice 2   My voice problem causes me to lose income 0   I feel as though I have to strain to produce voice 1   The clarity of my voice is unpredictable 0   My voice problem upsets me 2   My voice makes me feel handicapped 2   People ask, \"What's wrong with your voice?\" 0   VHI-10 13     Patient Supplied Answers To CSI Questionnaire      8/15/2024     1:00 PM   Cough Severity Index (CSI)   My cough is worse when I lie down 0   My coughing problem causes me to restrict my personal and social life 0   I tend to avoid places because of my cough problem 0   I feel embarrassed because of my coughing problem 0   People ask, ''What's wrong?'' because I cough a lot 0   I run out of air when I cough 0   My coughing problem affects my voice 0   My coughing problem limits my physical activity 0   My coughing problem upsets me 0   People ask me if I am sick because I cough a lot 0   CSI Score 0       Patient Supplied Answers To VHI Questionnaire       No data to display                Patient Supplied Answers To CSI Questionnaire       No data to display                Patient Supplied Answers To EAT Questionnaire       No data to display                    PERCEPTUAL EVALUATION (CPT 83078)  POSTURE / TENSION/ PALPATION OF THE LARYNGEAL AREA:   upper body  neck and shoulders    BREATHING:   appears within normal limits and adequate     LARYNGEAL PALPATION:   tenderness of the thyrohyoid area  reduced thyrohyoid space    VOICE:  Dalal states that today is a typical voice today, with clinician observing voice quality to be characterized as:  Roughness: Mild to moderate  Breathiness: Mild to moderate  Strain: WNL  Pitch:  F0/Habitual/Conversational speech:   informally judged as WNL  Voiced /i/ Max Phonation Time: informally judged as WNL  Voiceless /s/ max: informally judged as WNL  Pitch glide: neurologically normal, " limited upper range  Maximum Phonation Time:  seconds informally judged as WNL  Resonance:   Conversational speech:  laryngeal pharyngeal resonance  Loudness  Conversational speech:  Mildly reduced  Projected speech:  Mild to moderately reduced  Singing vs. Speech:  n/a  GLOBAL ASSESSMENT OF DYSPHONIA: 35/100    COUGH/THROAT CLEARING:  Globus sensation  Intermittent dry throat clearing    LARYNGEAL FUNCTION STUDIES (CPT 33028)        With hearing aid:  /a/ mean f0 = 161.016 Hz (SD = 2.635)  /i/ mean f0 = 165.796 Hz (SD = 3.140)  Glide:     Lowest f0 = 67.595 Hz      Higest f0 = 581.010 Hz      Range = 513.415 Hz   Connected Speech     Mean f0 = 158.431 Hz (SD 44.780)     Median f0 = 150.336 Hz      Lowest f0 = 108.353 Hz      Highest f0 = 500.409 Hz      Speaking Range = 392.055 Hz         Without Hearing aid:  /a/ mean f0 = 167.965 Hz (SD = 8.824)  /i/ mean f0 = 181.515 Hz (SD = 9.948)  Glide:     Lowest f0 = 72.683 Hz      Higest f0 = 427.491 Hz      Range = 354.808 Hz   Connected Speech     Mean f0 = 161.608 Hz (SD 28.141)     Median f0 = 157.985 Hz      Lowest f0 = 111.980 Hz      Highest f0 = 499.860 Hz      Speaking Range = 387.879 Hz         LARYNGEAL EXAMINATION  Procedure: Flexible endoscopy with chip-tip technology with stroboscopy, right nostril; topical anesthesia with 2% lidocaine and 0.025% oxymetazoline was sprayed into the nasal cavity and allowed 3 to 5 minutes for effect.  Performed by: SASHA Contreras, M.MJennifer (voice), M.A., CCC-SLP  Flexible endoscopy with chip-tip technology with stroboscopy, right nostril; topical anesthesia with 3% Lidocaine and 0.25% phenylephrine was applied.    The laryngeal and pharyngeal structures were evaluated for gross appearance, mobility, function, and focal lesions / abnormalities of the associated mucosa. Stroboscopy was warranted to evaluate closure, symmetry, and vibratory characteristics of the vocal folds.  All findings were within normal limits with the  exception of the following salient features:     This exam shows the following:    Posterior commissure: no remarkable inflammation.  Secretions: WNL    Movement:  Right Vocal Fold: Normal  Left Vocal Fold: Normal  Supraglottic hyperfunction during connected speech tasks: moderate 4 way restriction    Glottic Closure: complete  Upper Airway: Patent    Vocal Fold Findings:  Right Vocal Fold: subtle reduction in muscle tone  Left Vocal Fold: subtle reduction in muscle tone           The laryngeal exam was reviewed with Ms. Dalal, and I provided pertinent              explanations, as well as written and oral information.     The addition of stroboscopy allowed evaluation of the mucosal wave:  Amplitude: right: WNL; left: WNL  Symmetry: intermittent symmetry.  Closure pattern: complete.   Closure plane: at glottic level.   Phase distribution: normal.      The laryngeal exam was reviewed with Ms. Dalal, and I provided pertinent explanations, as well as written and oral information.            ASSESSMENT / PLAN  IMPRESSIONS: Teetee Dalal is a 73 year old woman, presenting today with Chronic Throat Clearing (R68.89), Globus Sensation (R09.89), Laryngeal Hyperfunction (J38.7), and Dysphonia (R49.0), as evidenced by evaluation the results of the evaluation, laryngeal function studies, as well as the laryngeal exam.    Remarkable findings included:  Perceptual evaluation demonstrated:   Roughness: Mild to moderate  Breathiness: Mild to moderate  Strain: WNL  Pitch:  F0/Habitual/Conversational speech:   informally judged as WNL  Laryngeal exam demonstrated:   Supraglottic hyperfunction during connected speech tasks: moderate 4 way restriction  Vocal Fold Findings:  Right Vocal Fold: subtle reduction in muscle tone  Left Vocal Fold: subtle reduction in muscle tone    Primary complaint of patient today included:   Dysphonia and throat concerns    Therefore, we recommended a course of speech therapy to address these  concerns.    Laryngeal function studies show significant increase in trans-glottal airflow compared to age and gender matched norms, increased inferred subglottal pressure, and an elevated AVQI.    STIMULABILITY: results of therapy probes during perceptual and laryngeal evaluation demonstrate improvement with use of forward resonant stimuli, use of clear speech protocol, and coordination of respiration and phonation    RECOMMENDATIONS:   A course of speech therapy is recommended to optimize vocal technique, improve voice quality, promote reduced discomfort, effort and fatigue, and help reduce throat clear, mucosal irritation, and promote resolution of globus sensation.  She demonstrates a Good prognosis for improvement given adherence to therapeutic recommendations.   Positive indicators: positive response to therapy probes diagnosis is known to respond to treatment high level of comittment  Negative indicators: none  Research: This patient will need to be asked at her next appointment regarding MTD   DURATION / FREQUENCY: 4 one-hour sessions.  A total of 6-8 sessions may be necessary.    GOALS:  Patient goal:   To improve and maintain a healthy voice quality  To understand the problem and fix it as much as possible  To have a normal and acceptable voice quality  To reduce her throat clearing and throat comfort to acceptable levels    Short-term goal(s): Within the first 4 sessions, Ms. Dalal:  will demonstrate assigned laryngeal massage techniques with 80% accuracy or better with no clinician support  will be able to independently list key factors in maintenance of good vocal hygiene with 80% accuracy, and report on their use outside the therapy room.  will utilize silent inhalation with good low-respiratory engagement 75% of the time during therapy tasks with minimal clinician support  will demonstrate semi-occluded vocal tract (SOVT) exercises with at least 80% accuracy with no clinician support    Long-term  "goal(s): In 6 months, Ms. Dalal will:  Report resolution of symptoms, and use of optimal voice quality and comfort to meet personal, social, and professional needs, 90% of the time during a typical week of vocal activities    This treatment plan was developed with the patient who agreed with the recommendations.  _______________________________________________________________________  THERAPY NOTE (CPT 72209)  Date of Service: 8/15/2024    SUBJECTIVE / OBJECTIVE:  Please refer to my evaluation report from today's encounter for full details regarding subjective data, patient reported measures, and diagnostic findings.    THERAPEUTIC ACTIVITIES  Exercises and techniques for optimal vocal hygiene including:  Systemic hydration, including strategies for increasing daily water intake  Topical hydration - Gargling (saline and plain water), saline nasal irrigation, humidification, steam, guaifenesin to reduce the thickened secretions / laryngeal irritation.  Awareness and reduction of phonotraumatic behaviors  Moderating voice use  Substituting non-voice alternative behaviors  Avoiding cough and throat clearing    Semi-Occluded Vocal Tract (SOVT) exercises instructed to reduce laryngeal tension, promote vocal fold pliability, and coordinate respiration and phonation  Straw phonation with water resistance was found to be most facilitating   Sustained phonation, and voice vs. voiceless productions used to promote easy voicing and raise awareness of laryngeal tension  Ascending and descending glides utilized to promote vocal fold pliability  \"Messa di voce\", gradual crescendo and decrescendo to vary medial compression was also utilized to promote vocal fold pliability.  Instructed on the benefits of using these exercises for improved coordination of breath flow with phonation and tissue mobilization.  Instructed on the importance of using these exercises as a warm-up / cool down,  and to re-calibrate the voice throughout the " day.    Counseling and Education  Asked many questions about the nature of her symptoms, and I answered all of these thoroughly.  Concepts of an optimal regimen for practice were instructed.  She should use an interval schedule of practice, with brief periods of practice frequently throughout each day  [place] concepts of volitional practice to facilitate motor learning.  A revised regimen for home practice was instructed.  I provided an AVS of today's therapeutic activities to facilitate practice.    ASSESSMENT/PLAN  PROGRESS TOWARD LONG TERM GOALS:   Adequate progress; too early for objective measures    IMPRESSIONS:  Chronic Throat Clearing (R68.89), Globus Sensation (R09.89), Laryngeal Hyperfunction (J38.7), and Dysphonia (R49.0). Ms. Dalal demonstrated good learning of therapeutic activit    PLAN: I will see Ms. Dalal in next week to continue therapy.    TOTAL SERVICE TIME: 60 minutes  EVALUATION OF VOICE AND RESONANCE (86911)  TREATMENT (03547)  LARYNGEAL FUNCTION STUDIES (40230)  ENDOSCOPIC LARYNGEAL EXAMINATION WITH STROBOSCOPY (75012)  NO CHARGE FACILITY FEE (02239)      Shauna Muse M.M. (voice), M.A., CCC/SLP  Speech-Language Pathologist  [facility] Trained Vocologist  [facility]  [phone]  [email or web address]  Pronouns: she/her      *this report was created in part through the use of computerized dictation software, and though reviewed following completion, some typographic errors may persist.  If there is confusion regarding any of this notes contents, please contact me for clarification                                                                                         Outpatient Speech Language Therapy Evaluation  PLAN OF TREATMENT FOR OUTPATIENT REHABILITATION  (COMPLETE FOR INITIAL CLAIMS ONLY)  Patient's Last Name, First Name, M.I.  YOB: 1950  Teetee Dalal                        Provider's Name  Shauna Muse, SLP Medical Record No.  [id number]                                Onset Date:  8/15/24   Start of Care Date: 8/15/24     Type: Speech Language Therapy Medical Diagnosis: Dysphonia [R49.0]                        Therapy Diagnosis:  Dysphonia [R49.0]   Visits from SOC:  1   _________________________________________________________________________________  Plan of Treatment:   Speech therapy    DURATION/FREQUENCY OF TREATMENT  Six weekly, one-hour sessions, with two monthly one-hour follow-up sessions    PROGNOSIS  Good prognosis for voice improvement with speech therapy and regular practice of therapeutic activities.    BARRIERS TO LEARNING/TEACHING AND LEARNING NEEDS  None/Unremarkable    GOALS:  Patient goal:   To improve and maintain a healthy voice quality  To understand the problem and fix it as much as possible  To have a normal and acceptable voice quality  To reduce her throat clearing and throat comfort to acceptable levels     Short-term goal(s): Within the first 4 sessions, Ms. Dalal:  will demonstrate assigned laryngeal massage techniques with 80% accuracy or better with no clinician support  will be able to independently list key factors in maintenance of good vocal hygiene with 80% accuracy, and report on their use outside the therapy room.  will utilize silent inhalation with good low-respiratory engagement 75% of the time during therapy tasks with minimal clinician support  will demonstrate semi-occluded vocal tract (SOVT) exercises with at least 80% accuracy with no clinician support     Long-term goal(s): In 6 months, Ms. Dalal will:  Report resolution of symptoms, and use of optimal voice quality and comfort to meet personal, social, and professional needs, 90% of the time during a typical week of vocal activities     _________________________________________________________________________________    I CERTIFY THE NEED FOR THESE SERVICES FURNISHED UNDER        THIS PLAN OF TREATMENT AND WHILE UNDER MY CARE     (Physician attestation of this  document indicates review and certification of the therapy plan).     Certification date from: 8/15/24  Certification date to: 11/13/24    Referring Provider: Mery Desai         Again, thank you for allowing me to participate in the care of your patient.      Sincerely,    Shauna Muse, SLP

## 2024-08-15 NOTE — PROGRESS NOTES
"  [facility]  [facility] - Dept. of Otolaryngology   Evaluation report - IN PERSON APPOINTMENT    Clinician: OFELIA Contreras.ROBINA MONTEMAYOR (voice), M.A., CCC-SLP  Referring physician:  Lloyd  Patient: Teetee Dalal  Date of Visit: 8/15/2024  # of Visits: 1  # of Therapy sessions: 1    Benefit & Certification period:   [facility] MEDICARE ADVANTAGE  Certification date from: 8/15/24  Certification date to: 24    HISTORY    Chief complaint: Mulugeta is a 73 year old presenting today for evaluation of voice and throat concerns.      Salient history: She has a history significant for dysphonia.  She is friends with a former patient of mine, Jing Rahman, who referred her to our clinic for therapy.    CURRENT SYMPTOMS INCLUDE  VOICE:   \"Strained vocal cords\"  10 yrs ago she got sick and it was after her father  and she was sick for months  Slowly recovered, and had a congestion in the throat feeling  Underwent a lot of testing, and then it  improved.  Intermittent  Currently, she can feel it in the throat  Comes and goes in severity  Fluticazone in the AM seems to help and improve, but can be better anyway - so not compeltely sure  No hearing in right ear and hearing aid in left ear  Appt with hearing doctor., but when she has yher aid in she finds that she can hear herself and she will start to straining  Partner is also [place] and so they are both straining.   Has found that her voice quality is better when she is not wearing her hearing aids.  Part of the problem and mucus in the throat sensation.     THROAT ISSUES:   Globus sensation  Occasional coughing, no throat clearing feels higher in the throat.     RESPIRATION:   Ho hx of asthma  Lost    SWALLOWING:   No issues    ADDITIONAL:  She denies Hx of reflux  And they said start with allergies and fluticazone.   Lost 15 lbs when had covid  Colonoscopy prior to that time, and she felt better and seemed to help her. She decided to do intermittent fasting " for a while, and is now more active.   Bilateral [place]    OTHER PERTINENT HISTORY  Radiation treatment for breast cancer, has been clear since that time. No voice or breathing changes around that time.  Past Medical History:   Diagnosis Date    Breast cancer (H) 9/10/96    Change in stool habits 7/5/2016    Elevated blood pressure 1/20/2012    Elevated blood pressure reading without diagnosis of hypertension 10/3/2017    Encounter for screening colonoscopy 6/17/2011    High risk, recommended every 5 years, see scan 6/6/11, nromal  (Problem list name updated by automated process. Provider to review and confirm.)    Hearing loss     Hyperlipidemia LDL goal <130 2/18/2011    [place] risk 5% intermediate age 60     Hypertension     On occasion    Intraductal carcinoma, noninfiltrating 4/3/2013    Noted by cancer registry, I'm not sure who is following her, see scan 4/3/2013     Nasal congestion     Screening colonoscopy 6/17/2011     Past Surgical History:   Procedure Laterality Date    CATARACT EXTRACTION      COLONOSCOPY N/A 1/18/2024    Procedure: Colonoscopy;  Surgeon: Tiffanie Fountain MD;  Location:  GI    HYSTERECTOMY TOTAL ABDOMINAL, BILATERAL SALPINGO-OOPHORECTOMY, COMBINED  02/18/2004    lumpectomy left breast - [place]    LUMPECTOMY BREAST  1996    SURGICAL HISTORY OF -   09/2003    hyperplastic colonic polyps - benign       OBJECTIVE  PATIENT REPORTED MEASURES    Patient Supplied Answers To VHI Questionnaire      8/15/2024     1:00 PM   Voice Handicap Index (VHI-10)   My voice makes it difficult for people to hear me 2   People have difficulty understanding me in a noisy room 2   My voice difficulties restrict my personal and social life.  2   I feel left out of conversations because of my voice 2   My voice problem causes me to lose income 0   I feel as though I have to strain to produce voice 1   The clarity of my voice is unpredictable 0   My voice problem upsets me 2   My voice makes me feel  "handicapped 2   People ask, \"What's wrong with your voice?\" 0   VHI-10 13     Patient Supplied Answers To CSI Questionnaire      8/15/2024     1:00 PM   Cough Severity Index (CSI)   My cough is worse when I lie down 0   My coughing problem causes me to restrict my personal and social life 0   I tend to avoid places because of my cough problem 0   I feel embarrassed because of my coughing problem 0   People ask, ''What's wrong?'' because I cough a lot 0   I run out of air when I cough 0   My coughing problem affects my voice 0   My coughing problem limits my physical activity 0   My coughing problem upsets me 0   People ask me if I am sick because I cough a lot 0   CSI Score 0       Patient Supplied Answers To VHI Questionnaire       No data to display                Patient Supplied Answers To CSI Questionnaire       No data to display                Patient Supplied Answers To EAT Questionnaire       No data to display                    PERCEPTUAL EVALUATION (CPT 96062)  POSTURE / TENSION/ PALPATION OF THE LARYNGEAL AREA:   upper body  neck and shoulders    BREATHING:   appears within normal limits and adequate     LARYNGEAL PALPATION:   tenderness of the thyrohyoid area  reduced thyrohyoid space    VOICE:  Dalal states that today is a typical voice today, with clinician observing voice quality to be characterized as:  Roughness: Mild to moderate  Breathiness: Mild to moderate  Strain: WNL  Pitch:  F0/Habitual/Conversational speech:   informally judged as WNL  Voiced /i/ Max Phonation Time: informally judged as WNL  Voiceless /s/ max: informally judged as WNL  Pitch glide: neurologically normal, limited upper range  Maximum Phonation Time:  seconds informally judged as WNL  Resonance:   Conversational speech:  laryngeal pharyngeal resonance  Loudness  Conversational speech:  Mildly reduced  Projected speech:  Mild to moderately reduced  Singing vs. Speech:  n/a  GLOBAL ASSESSMENT OF DYSPHONIA: " 35/100    COUGH/THROAT CLEARING:  Globus sensation  Intermittent dry throat clearing    LARYNGEAL FUNCTION STUDIES (CPT 38029)        With hearing aid:  /a/ mean f0 = 161.016 Hz (SD = 2.635)  /i/ mean f0 = 165.796 Hz (SD = 3.140)  Glide:     Lowest f0 = 67.595 Hz      Higest f0 = 581.010 Hz      Range = 513.415 Hz   Connected Speech     Mean f0 = 158.431 Hz (SD 44.780)     Median f0 = 150.336 Hz      Lowest f0 = 108.353 Hz      Highest f0 = 500.409 Hz      Speaking Range = 392.055 Hz         Without Hearing aid:  /a/ mean f0 = 167.965 Hz (SD = 8.824)  /i/ mean f0 = 181.515 Hz (SD = 9.948)  Glide:     Lowest f0 = 72.683 Hz      Higest f0 = 427.491 Hz      Range = 354.808 Hz   Connected Speech     Mean f0 = 161.608 Hz (SD 28.141)     Median f0 = 157.985 Hz      Lowest f0 = 111.980 Hz      Highest f0 = 499.860 Hz      Speaking Range = 387.879 Hz         LARYNGEAL EXAMINATION  Procedure: Flexible endoscopy with chip-tip technology with stroboscopy, right nostril; topical anesthesia with 2% lidocaine and 0.025% oxymetazoline was sprayed into the nasal cavity and allowed 3 to 5 minutes for effect.  Performed by: SASHA Contreras, M.MJennifer (voice) M.AJennifer, CCC-SLP  Flexible endoscopy with chip-tip technology with stroboscopy, right nostril; topical anesthesia with 3% Lidocaine and 0.25% phenylephrine was applied.    The laryngeal and pharyngeal structures were evaluated for gross appearance, mobility, function, and focal lesions / abnormalities of the associated mucosa. Stroboscopy was warranted to evaluate closure, symmetry, and vibratory characteristics of the vocal folds.  All findings were within normal limits with the exception of the following salient features:     This exam shows the following:    Posterior commissure: no remarkable inflammation.  Secretions: WNL    Movement:  Right Vocal Fold: Normal  Left Vocal Fold: Normal  Supraglottic hyperfunction during connected speech tasks: moderate 4 way  restriction    Glottic Closure: complete  Upper Airway: Patent    Vocal Fold Findings:  Right Vocal Fold: subtle reduction in muscle tone  Left Vocal Fold: subtle reduction in muscle tone           The laryngeal exam was reviewed with Ms. Dalal, and I provided pertinent              explanations, as well as written and oral information.     The addition of stroboscopy allowed evaluation of the mucosal wave:  Amplitude: right: WNL; left: WNL  Symmetry: intermittent symmetry.  Closure pattern: complete.   Closure plane: at glottic level.   Phase distribution: normal.      The laryngeal exam was reviewed with Ms. Dalal, and I provided pertinent explanations, as well as written and oral information.            ASSESSMENT / PLAN  IMPRESSIONS: Teetee Dalal is a 73 year old woman, presenting today with Chronic Throat Clearing (R68.89), Globus Sensation (R09.89), Laryngeal Hyperfunction (J38.7), and Dysphonia (R49.0), as evidenced by evaluation the results of the evaluation, laryngeal function studies, as well as the laryngeal exam.    Remarkable findings included:  Perceptual evaluation demonstrated:   Roughness: Mild to moderate  Breathiness: Mild to moderate  Strain: WNL  Pitch:  F0/Habitual/Conversational speech:   informally judged as WNL  Laryngeal exam demonstrated:   Supraglottic hyperfunction during connected speech tasks: moderate 4 way restriction  Vocal Fold Findings:  Right Vocal Fold: subtle reduction in muscle tone  Left Vocal Fold: subtle reduction in muscle tone    Primary complaint of patient today included:   Dysphonia and throat concerns    Therefore, we recommended a course of speech therapy to address these concerns.    Laryngeal function studies show significant increase in trans-glottal airflow compared to age and gender matched norms, increased inferred subglottal pressure, and an elevated AVQI.    STIMULABILITY: results of therapy probes during perceptual and laryngeal evaluation demonstrate  improvement with use of forward resonant stimuli, use of clear speech protocol, and coordination of respiration and phonation    RECOMMENDATIONS:   A course of speech therapy is recommended to optimize vocal technique, improve voice quality, promote reduced discomfort, effort and fatigue, and help reduce throat clear, mucosal irritation, and promote resolution of globus sensation.  She demonstrates a Good prognosis for improvement given adherence to therapeutic recommendations.   Positive indicators: positive response to therapy probes diagnosis is known to respond to treatment high level of comittment  Negative indicators: none  Research: This patient will need to be asked at her next appointment regarding MTD   DURATION / FREQUENCY: 4 one-hour sessions.  A total of 6-8 sessions may be necessary.    GOALS:  Patient goal:   To improve and maintain a healthy voice quality  To understand the problem and fix it as much as possible  To have a normal and acceptable voice quality  To reduce her throat clearing and throat comfort to acceptable levels    Short-term goal(s): Within the first 4 sessions, Ms. Dalal:  will demonstrate assigned laryngeal massage techniques with 80% accuracy or better with no clinician support  will be able to independently list key factors in maintenance of good vocal hygiene with 80% accuracy, and report on their use outside the therapy room.  will utilize silent inhalation with good low-respiratory engagement 75% of the time during therapy tasks with minimal clinician support  will demonstrate semi-occluded vocal tract (SOVT) exercises with at least 80% accuracy with no clinician support    Long-term goal(s): In 6 months, Ms. Dalal will:  Report resolution of symptoms, and use of optimal voice quality and comfort to meet personal, social, and professional needs, 90% of the time during a typical week of vocal activities    This treatment plan was developed with the patient who agreed with the  "recommendations.  _______________________________________________________________________  THERAPY NOTE (CPT 82089)  Date of Service: 8/15/2024    SUBJECTIVE / OBJECTIVE:  Please refer to my evaluation report from today's encounter for full details regarding subjective data, patient reported measures, and diagnostic findings.    THERAPEUTIC ACTIVITIES  Exercises and techniques for optimal vocal hygiene including:  Systemic hydration, including strategies for increasing daily water intake  Topical hydration - Gargling (saline and plain water), saline nasal irrigation, humidification, steam, guaifenesin to reduce the thickened secretions / laryngeal irritation.  Awareness and reduction of phonotraumatic behaviors  Moderating voice use  Substituting non-voice alternative behaviors  Avoiding cough and throat clearing    Semi-Occluded Vocal Tract (SOVT) exercises instructed to reduce laryngeal tension, promote vocal fold pliability, and coordinate respiration and phonation  Straw phonation with water resistance was found to be most facilitating   Sustained phonation, and voice vs. voiceless productions used to promote easy voicing and raise awareness of laryngeal tension  Ascending and descending glides utilized to promote vocal fold pliability  \"Messa di voce\", gradual crescendo and decrescendo to vary medial compression was also utilized to promote vocal fold pliability.  Instructed on the benefits of using these exercises for improved coordination of breath flow with phonation and tissue mobilization.  Instructed on the importance of using these exercises as a warm-up / cool down,  and to re-calibrate the voice throughout the day.    Counseling and Education  Asked many questions about the nature of her symptoms, and I answered all of these thoroughly.  Concepts of an optimal regimen for practice were instructed.  She should use an interval schedule of practice, with brief periods of practice frequently throughout " each day  Kirby concepts of volitional practice to facilitate motor learning.  A revised regimen for home practice was instructed.  I provided an AVS of today's therapeutic activities to facilitate practice.    ASSESSMENT/PLAN  PROGRESS TOWARD LONG TERM GOALS:   Adequate progress; too early for objective measures    IMPRESSIONS:  Chronic Throat Clearing (R68.89), Globus Sensation (R09.89), Laryngeal Hyperfunction (J38.7), and Dysphonia (R49.0). Ms. Dalal demonstrated good learning of therapeutic activit    PLAN: I will see Ms. Dalal in next week to continue therapy.    TOTAL SERVICE TIME: 60 minutes  EVALUATION OF VOICE AND RESONANCE (84676)  TREATMENT (52015)  LARYNGEAL FUNCTION STUDIES (40162)  ENDOSCOPIC LARYNGEAL EXAMINATION WITH STROBOSCOPY (24170)  NO CHARGE FACILITY FEE (99915)      Shauna Muse M.M. (voice), M.A., CCC/SLP  Speech-Language Pathologist  [facility] Trained Vocologist  [facility]  [phone]  [email or web address]  Pronouns: she/her      *this report was created in part through the use of computerized dictation software, and though reviewed following completion, some typographic errors may persist.  If there is confusion regarding any of this notes contents, please contact me for clarification

## 2024-08-19 ENCOUNTER — VIRTUAL VISIT (OUTPATIENT)
Dept: OTOLARYNGOLOGY | Facility: CLINIC | Age: 74
End: 2024-08-19
Payer: COMMERCIAL

## 2024-08-19 ENCOUNTER — TELEPHONE (OUTPATIENT)
Dept: OTOLARYNGOLOGY | Facility: CLINIC | Age: 74
End: 2024-08-19

## 2024-08-19 DIAGNOSIS — R49.0 DYSPHONIA: Primary | ICD-10-CM

## 2024-08-19 PROCEDURE — 92507 TX SP LANG VOICE COMM INDIV: CPT | Mod: GN | Performed by: SPEECH-LANGUAGE PATHOLOGIST

## 2024-08-19 NOTE — LETTER
"8/19/2024       RE: Teetee Dalal  [address]  [place] [address]     Dear Colleague,    Thank you for referring your patient, Teetee Dalal, to the [facility] [place] [facility]. Please see a copy of my visit note below.    Mulugeta is a 73 year old female who is being cared for via a billable virtual visit.        The patient/client has been notified and verbally consented to the following statements:   This video visit will be conducted between you and your provider.  If during the course of the call the provider feels a video visit is not appropriate, you will not be charged for this service.    Provider has received verbal consent for billable virtual visit from the patient? Yes    Preferred method for receiving information: [employer]     Call initiated at: 3:02 PM  Platform used to conduct today's virtual appointment: AM Isai Video  Location of provider: Residence  Location of patient: Residence. State: [place]  # of Visits: 2  # of Therapy sessions: 1    Benefit & Certification period: n/a      [facility] VOICE [facility]  THERAPY NOTE (CPT 48954)  Patient: Teetee Dalal  Date of Service: 8/19/2024  Referring physician: Lloyd  Impressions from most recent evaluation (8/15/24):  \"IMPRESSIONS: Teetee Dalal is a 73 year old woman, presenting today with Chronic Throat Clearing (R68.89), Globus Sensation (R09.89), Laryngeal Hyperfunction (J38.7), and Dysphonia (R49.0), as evidenced by evaluation the results of the evaluation, laryngeal function studies, as well as the laryngeal exam.    Remarkable findings included:  Perceptual evaluation demonstrated:   Roughness: Mild to moderate  Breathiness: Mild to moderate  Strain: WNL  Pitch:  F0/Habitual/Conversational speech:   informally judged as WNL  Laryngeal exam demonstrated:   Supraglottic hyperfunction during connected speech tasks: moderate 4 way restriction  Vocal Fold Findings:  Right " "Vocal Fold: subtle reduction in muscle tone  Left Vocal Fold: subtle reduction in muscle tone     Primary complaint of patient today included:   Dysphonia and throat concerns     Therefore, we recommended a course of speech therapy to address these concerns.     Laryngeal function studies show significant increase in trans-glottal airflow compared to age and gender matched norms, increased inferred subglottal pressure, and an elevated AVQI.\"    SUBJECTIVE:  Since the last appointment, Ms. Dalal reports the following:   Overall she reports that symptoms are stable  Depends, but setimes workds and tother times not as much with pull down and swallow.     OBJECTIVE:  Ms. Dalal presents today with the following:  VOICE:  Mulugeta states that today is a typical voice today, with clinician observing voice quality to be characterized as:  Roughness: Mild to moderate  Breathiness: Mild to moderate  Strain: WNL  Pitch:  F0/Habitual/Conversational speech:   informally judged as WNL    PATIENT REPORTED MEASURES:  Patient Supplied Answers To SLP QOL Questionnaire       No data to display              Patient Supplied Answers To VHI Questionnaire      8/15/2024     1:00 PM   Voice Handicap Index (VHI-10)   My voice makes it difficult for people to hear me 2   People have difficulty understanding me in a noisy room 2   My voice difficulties restrict my personal and social life.  2   I feel left out of conversations because of my voice 2   My voice problem causes me to lose income 0   I feel as though I have to strain to produce voice 1   The clarity of my voice is unpredictable 0   My voice problem upsets me 2   My voice makes me feel handicapped 2   People ask, \"What's wrong with your voice?\" 0   VHI-10 13         THERAPEUTIC ACTIVITIES  Demonstrated previous exercises.  demonstrated improved technique  appropriate redirection provided  instruction provided for increased level of complexity/difficulty    Exercises to promote optimal " "respiratory mechanics  she demonstrated clavicular/neck/shoulder involvement in inhalation  With clinician support, patient was able to demonstrate improved abdominal relaxation and engagement on inhalation  Optimal exhalation using inward engagement of the abdominal wall with no corresponding collapse of the upper chest cavity was trained using the pulsed \"sh\" task  Alston a respiratory pacing exercise; this was helpful  acceptable improvement in airflow and respiratory mechanics    Semi-Occluded Vocal Tract (SOVT) exercises instructed to reduce laryngeal tension, promote vocal fold pliability, and coordinate respiration and phonation  Sustained /m/ was found to be most facilitating   Sustained phonation, and voice vs. voiceless productions used to promote easy voicing and raise awareness of laryngeal tension  Ascending and descending glides utilized to promote vocal fold pliability  \"Messa di voce\", gradual crescendo and decrescendo to vary medial compression was also utilized to promote vocal fold pliability.  Instructed on the benefits of using these exercises for improved coordination of breath flow with phonation and tissue mobilization.  Instructed on the importance of using these exercises as a warm-up / cool down,  and to re-calibrate the voice throughout the day.    Exercises in techniques for improved airflow during phonation  Speech material with /ju/ glides was facilitating at the word level.  Progressed to easy onset/ flow, and blending phrases  Instructed with a descending 5th, as well as an arpeggio pattern; this was helpful.  Alston techniques to reduce glottal hernandez and improve breath flow; negative practice improved awareness today.    Resonant Voice Therapy (RVT) exercises to promote forward locus of resonance and optimized pattern of laryngeal adduction  Speech material that elicits a high, forward tongue position (/n/) was most facilitating  Syllable level using /m/ in alternation with cardinal " vowels on sustained pitches and speech inflection  Able to recognize improvement in quality and comfort    Counseling and Education:  Asked many questions about the nature of her symptoms, and I answered all of these thoroughly.  I provided an audio recording and handouts of today's therapeutic activities to facilitate practice.      ASSESSMENT/PLAN  PROGRESS TOWARD LONG TERM GOALS:   Adequate progress; too early for objective measures    IMPRESSIONS:  Chronic Throat Clearing (R68.89), Globus Sensation (R09.89), Laryngeal Hyperfunction (J38.7), and Dysphonia (R49.0). Ms. Dalal demonstrated good learning of therapeutic activities.     PLAN: I will see Ms. Dalal in 2 weeks, at which point we will continue therapy.   For practice goals see AVS.     Next Clinic Appt: 9/5    TOTAL SERVICE TIME:   Call Initiated at: 3:02 PM  Call Ended at: 4p           CPT Billing Codes:   TREATMENT OF SPEECH, LANGUAGE, VOICE, COMMUNICATION, and/or AUDITORY PROCESSING DISORDER (91244)    Shauna Muse M.M. (voice), MVIMAL, CCC/SLP  Speech-Language Pathologist  [facility] Trained Vocologist  [facility]  [phone]  [email or web address]  Pronouns: she/her      *this report was created in part through the use of computerized dictation software, and though reviewed following completion, some typographic errors may persist.  If there is confusion regarding any of this notes contents, please contact me for clarification            Again, thank you for allowing me to participate in the care of your patient.      Sincerely,    Shauna Muse, SLP

## 2024-08-19 NOTE — PROGRESS NOTES
"Mulugeta is a 73 year old female who is being cared for via a billable virtual visit.        The patient/client has been notified and verbally consented to the following statements:   This video visit will be conducted between you and your provider.  If during the course of the call the provider feels a video visit is not appropriate, you will not be charged for this service.    Provider has received verbal consent for billable virtual visit from the patient? Yes    Preferred method for receiving information: [facility]     Call initiated at: 3:02 PM  Platform used to conduct today's virtual appointment: BALDEMAR Alex Video  Location of provider: Residence  Location of patient: Residence. State: [place]  # of Visits: 2  # of Therapy sessions: 1    Benefit & Certification period: n/a      [facility] VOICE CLINIC  THERAPY NOTE (CPT 30890)  Patient: Teetee Dalal  Date of Service: 8/19/2024  Referring physician: Lloyd  Impressions from most recent evaluation (8/15/24):  \"IMPRESSIONS: Teetee Dalal is a 73 year old woman, presenting today with Chronic Throat Clearing (R68.89), Globus Sensation (R09.89), Laryngeal Hyperfunction (J38.7), and Dysphonia (R49.0), as evidenced by evaluation the results of the evaluation, laryngeal function studies, as well as the laryngeal exam.    Remarkable findings included:  Perceptual evaluation demonstrated:   Roughness: Mild to moderate  Breathiness: Mild to moderate  Strain: WNL  Pitch:  F0/Habitual/Conversational speech:   informally judged as WNL  Laryngeal exam demonstrated:   Supraglottic hyperfunction during connected speech tasks: moderate 4 way restriction  Vocal Fold Findings:  Right Vocal Fold: subtle reduction in muscle tone  Left Vocal Fold: subtle reduction in muscle tone     Primary complaint of patient today included:   Dysphonia and throat concerns     Therefore, we recommended a course of speech therapy to address these concerns.     Laryngeal function studies show significant increase in " "trans-glottal airflow compared to age and gender matched norms, increased inferred subglottal pressure, and an elevated AVQI.\"    SUBJECTIVE:  Since the last appointment, Ms. Dlaal reports the following:   Overall she reports that symptoms are stable  Depends, but setimes workds and tother times not as much with pull down and swallow.     OBJECTIVE:  Ms. Dalal presents today with the following:  VOICE:  Mulugeta states that today is a typical voice today, with clinician observing voice quality to be characterized as:  Roughness: Mild to moderate  Breathiness: Mild to moderate  Strain: WNL  Pitch:  F0/Habitual/Conversational speech:   informally judged as WNL    PATIENT REPORTED MEASURES:  Patient Supplied Answers To SLP QOL Questionnaire       No data to display              Patient Supplied Answers To VHI Questionnaire      8/15/2024     1:00 PM   Voice Handicap Index (VHI-10)   My voice makes it difficult for people to hear me 2   People have difficulty understanding me in a noisy room 2   My voice difficulties restrict my personal and social life.  2   I feel left out of conversations because of my voice 2   My voice problem causes me to lose income 0   I feel as though I have to strain to produce voice 1   The clarity of my voice is unpredictable 0   My voice problem upsets me 2   My voice makes me feel handicapped 2   People ask, \"What's wrong with your voice?\" 0   VHI-10 13         THERAPEUTIC ACTIVITIES  Demonstrated previous exercises.  demonstrated improved technique  appropriate redirection provided  instruction provided for increased level of complexity/difficulty    Exercises to promote optimal respiratory mechanics  she demonstrated clavicular/neck/shoulder involvement in inhalation  With clinician support, patient was able to demonstrate improved abdominal relaxation and engagement on inhalation  Optimal exhalation using inward engagement of the abdominal wall with no corresponding collapse of the upper " "chest cavity was trained using the pulsed \"sh\" task  [place] a respiratory pacing exercise; this was helpful  acceptable improvement in airflow and respiratory mechanics    Semi-Occluded Vocal Tract (SOVT) exercises instructed to reduce laryngeal tension, promote vocal fold pliability, and coordinate respiration and phonation  Sustained /m/ was found to be most facilitating   Sustained phonation, and voice vs. voiceless productions used to promote easy voicing and raise awareness of laryngeal tension  Ascending and descending glides utilized to promote vocal fold pliability  \"Messa di voce\", gradual crescendo and decrescendo to vary medial compression was also utilized to promote vocal fold pliability.  Instructed on the benefits of using these exercises for improved coordination of breath flow with phonation and tissue mobilization.  Instructed on the importance of using these exercises as a warm-up / cool down,  and to re-calibrate the voice throughout the day.    Exercises in techniques for improved airflow during phonation  Speech material with /ju/ glides was facilitating at the word level.  Progressed to easy onset/ flow, and blending phrases  Instructed with a descending 5th, as well as an arpeggio pattern; this was helpful.  [place] techniques to reduce glottal hernandez and improve breath flow; negative practice improved awareness today.    Resonant Voice Therapy (RVT) exercises to promote forward locus of resonance and optimized pattern of laryngeal adduction  Speech material that elicits a high, forward tongue position (/n/) was most facilitating  Syllable level using /m/ in alternation with cardinal vowels on sustained pitches and speech inflection  Able to recognize improvement in quality and comfort    Counseling and Education:  Asked many questions about the nature of her symptoms, and I answered all of these thoroughly.  I provided an audio recording and handouts of today's therapeutic activities to " facilitate practice.      ASSESSMENT/PLAN  PROGRESS TOWARD LONG TERM GOALS:   Adequate progress; too early for objective measures    IMPRESSIONS:  Chronic Throat Clearing (R68.89), Globus Sensation (R09.89), Laryngeal Hyperfunction (J38.7), and Dysphonia (R49.0). Ms. Dalal demonstrated good learning of therapeutic activities.     PLAN: I will see Ms. Dalal in 2 weeks, at which point we will continue therapy.   For practice goals see AVS.     Next Clinic Appt: 9/5    TOTAL SERVICE TIME:   Call Initiated at: 3:02 PM  Call Ended at: 4p           CPT Billing Codes:   TREATMENT OF SPEECH, LANGUAGE, VOICE, COMMUNICATION, and/or AUDITORY PROCESSING DISORDER (23222)    Shauna Muse M.M. (voice), M.A., CCC/SLP  Speech-Language Pathologist  [facility] Trained Vocologist  [facility]  [phone]  [email or web address]  Pronouns: she/her      *this report was created in part through the use of computerized dictation software, and though reviewed following completion, some typographic errors may persist.  If there is confusion regarding any of this notes contents, please contact me for clarification

## 2024-08-19 NOTE — PATIENT INSTRUCTIONS
"After Visit Summary    Patient: Teetee Dalal  Date of Visit: 8/19/2024    \"Handouts\" that go along with today's order of activities include (below):   Frequency of practice: 2-3x/day unless marked otherwise    Order of today's appointment:  Breathing:   In the morning and evening (twice daily) for 2-5 minutes:   Breathe while lying on your back with your face and knees up. Hands on tummy and chest.  Take a breath in with rounded lips and exhale with a  sshhhhhh    Inhale  = Inflate; exhale = deflate  Shh exercise (loudness) : 1) one on one loudness 2) 2-3 people 3) 5-6 people loudness.  Feel breath in the abdominal area and not in the throat.      Breathing Tips:  Keep shoulders down and chest relaxed    Forward Resonant /m/ sound      WHY:  Though these exercises seems (and feels) silly they are helpful for a number of reasons.  First, they make you use your air generously and consistently, helping you to coordinate your breath and your voice.  Second, they lengthen and narrow the vocal tract.  This narrowing (or semi-occlusion in scientific terms) creates back pressure in your throat which has been shown to help the vocal folds vibrate more easily and reduce how hard some of the other muscles are squeezing.    HOW:    Use a gentle  mmmm  like you are relaxing into a comfy chair at the end of a long day.   Make sure that your jaw is released (never clenched), and that your tongue is hanging out like a rug on the floor of your mouth  There can be a tiny  h  at the start of it to keep you from getting too tight.  Start off with the  sigh  pattern listed below.    Feel how open and relaxed your throat feels when you practice these sounds. If it gets tight, don't sweat it.  Just breath, relax, and start again.  Across all the exercises make sure you are getting a nice low breath and feeling the steady inward motion of low abdominal muscles when making sound.      Practice 5 times a day for no more than 3 minutes, and " whenever you feel fatigued.    Aren't sure if you are doing it right? Ask yourself these three questions:  Does it feel easy?  Are the bubbles and voice consistent?  Do you feel that forward buzz?            What sounds to make:    Single pitches - use any comfortable pitch and sustain the note for as long as it feels free and easy, and your lips or tongue are bubbling.        Sighs - glide from high to low like you are sitting down in a comfortable chair after a long day of work        [place] - Start on a medium pitch and glide up just a bit and back down          Shape the phrase/ descending glide:      Spacious Speech Phrases  Frequency of practice:    Start with an open-throated breath (like with a sniff of something wonderful or the beginning of a yawn or a surprise breath).  Let the breath do the work.  Be light, fluid, legato, and spacious.  Exaggerate inflection!  Glide over your entire pitch range.  These should feel effortless in your throat.  This is like a massage for your vocal folds, stretching and vladimir the muscles, while vibrating in a spacious throat.    Tongue touching lower teeth and you voice each vowel sound:  Heeeeeeeee   Haaaaaaaay   Hooooooooh   Huuuuuuuu   Haaaaaaaah     Hi there   How are you?   Who are you?   Who is she?   Who is he?   Who are they?   Hello, hello, hello        Phrases for Blending     Fall_over_the_chair                      Go_(w)into the store  Leave_on_the_lights                     The(e)_(y)only one  See_it_over_there                         The(e)_(y)other day  Do_it_now      Not_even her mom  Put(d)_on_your_shoes         Not_any more  Down_under_the_stairs  Cold_as ice  Not_old_enough   the ice is cold  Look_at_the_sky   he's (z)ill with the flu  The_end_of_the_story  yes_and no  High_up_on_the_shelf  one at a time                Shauna Muse M.M. (voice), M.A., CCC/SLP  Speech-Language Pathologist  [facility] Certified Vocologist  [facility]  [email or web address]  she/her

## 2024-08-22 NOTE — PROGRESS NOTES
Outpatient Speech Language Therapy Evaluation  PLAN OF TREATMENT FOR OUTPATIENT REHABILITATION  (COMPLETE FOR INITIAL CLAIMS ONLY)  Patient's Last Name, First Name, M.I.  YOB: 1950  Teetee Dalal                        Provider's Name  Shauna Muse, SLP Medical Record No.  [id number]                               Onset Date:  8/15/24   Start of Care Date: 8/15/24     Type: Speech Language Therapy Medical Diagnosis: Dysphonia [R49.0]                        Therapy Diagnosis:  Dysphonia [R49.0]   Visits from SOC:  1   _________________________________________________________________________________  Plan of Treatment:   Speech therapy    DURATION/FREQUENCY OF TREATMENT  Six weekly, one-hour sessions, with two monthly one-hour follow-up sessions    PROGNOSIS  Good prognosis for voice improvement with speech therapy and regular practice of therapeutic activities.    BARRIERS TO LEARNING/TEACHING AND LEARNING NEEDS  None/Unremarkable    GOALS:  Patient goal:   To improve and maintain a healthy voice quality  To understand the problem and fix it as much as possible  To have a normal and acceptable voice quality  To reduce her throat clearing and throat comfort to acceptable levels     Short-term goal(s): Within the first 4 sessions, Ms. Dalal:  will demonstrate assigned laryngeal massage techniques with 80% accuracy or better with no clinician support  will be able to independently list key factors in maintenance of good vocal hygiene with 80% accuracy, and report on their use outside the therapy room.  will utilize silent inhalation with good low-respiratory engagement 75% of the time during therapy tasks with minimal clinician support  will demonstrate semi-occluded vocal tract (SOVT) exercises with at least 80% accuracy with no clinician support     Long-term goal(s): In 6 months, Ms. Dalal will:  Report resolution of  symptoms, and use of optimal voice quality and comfort to meet personal, social, and professional needs, 90% of the time during a typical week of vocal activities     _________________________________________________________________________________    I CERTIFY THE NEED FOR THESE SERVICES FURNISHED UNDER        THIS PLAN OF TREATMENT AND WHILE UNDER MY CARE     (Physician attestation of this document indicates review and certification of the therapy plan).     Certification date from: 8/15/24  Certification date to: 11/13/24    Referring Provider: Mery Desai

## 2024-09-05 ENCOUNTER — OFFICE VISIT (OUTPATIENT)
Dept: OTOLARYNGOLOGY | Facility: CLINIC | Age: 74
End: 2024-09-05
Payer: COMMERCIAL

## 2024-09-05 DIAGNOSIS — R49.0 DYSPHONIA: Primary | ICD-10-CM

## 2024-09-05 PROCEDURE — 92507 TX SP LANG VOICE COMM INDIV: CPT | Mod: GN | Performed by: SPEECH-LANGUAGE PATHOLOGIST

## 2024-09-05 NOTE — PATIENT INSTRUCTIONS
"After Visit Summary    Patient: Teetee Dalal  Date of Visit: 9/5/2024    These notes are also available in your MyChart. Please take a few moments to find them under \"Past Appointments\" in the [facility] system, as Shauna will start to phase out e-mail communications.    \"Handouts\" that go along with today's order of activities include (below):   Frequency of practice: 2-3x/day unless marked otherwise    Try laughing yoga and let me know how you like it!    Order of today's appointment:  Continue to unload your \"muscle tension situation\" by humming (or zzzz) and swallowing periodically.     Shhh as you stand up or exertion where you are lifting or climbing stairs (helping to keep the throat relaxed).  Reducing Valsalva.  Yawning is ok        Breathing:   In the morning and evening (twice daily) for 2-5 minutes:     Shh exercise (loudness) : 1) one on one loudness 2) 2-3 people 3) 5-6 people loudness.  NO SHOULDERS. Feel breath in the abdominal area and not in the throat.   Now try with \"Few\"   Now try \"Hey\"    **don't use your throat muscles so much!    Pulsed breathing Exercise (2-3x/day):   3x each  Sh, Sh, Sh...  S, S, S...  F, F, F.....  Now, all three combined Sh, Sh, Sh, S, S, S, F, F, F (tummy IN for each sound)   (K, H are also sounds that are voiceless)    Breath Pacing:   Coordinate sipping breath with counting:  Breathe in through rounded lips with tongue behind lower teeth or touching at the bump behind upper teeth  Breathe in through rounded lips, then speak \"1\"  Breathe in through rounded lips, then speak  \"1-2\"  Breathe in through rounded lips, then speak  \"1-2-3\"  Breathe in through rounded lips, then speak  \"1-2-3-4\"... up to 5, and then keep going up to 10      Breathe first then speak one of the words above.          Shauna Muse M.M. (voice), M.A., CCC/SLP  Speech-Language Pathologist  [facility] Certified Vocologist  [facility]  [email or web address]  she/her    "

## 2024-09-05 NOTE — PROGRESS NOTES
"[facility] VOICE CLINIC  THERAPY NOTE (CPT 84919) -IN PERSON    Patient: Teetee Dalal  Date of Service: 9/5/2024  # of Visits: 3  # of Therapy sessions: 2  Benefit & Certification period:   [facility] MEDICARE ADVANTAGE  Certification date from: 8/15/24  Certification date to: 11/13/24    Referring physician: Lloyd  Impressions from most recent evaluation (8/15/24):  \"IMPRESSIONS: Teetee Dalal is a 73 year old woman, presenting today with Chronic Throat Clearing (R68.89), Globus Sensation (R09.89), Laryngeal Hyperfunction (J38.7), and Dysphonia (R49.0), as evidenced by evaluation the results of the evaluation, laryngeal function studies, as well as the laryngeal exam.    Remarkable findings included:  Perceptual evaluation demonstrated:   Roughness: Mild to moderate  Breathiness: Mild to moderate  Strain: WNL  Pitch:  F0/Habitual/Conversational speech:   informally judged as WNL  Laryngeal exam demonstrated:   Supraglottic hyperfunction during connected speech tasks: moderate 4 way restriction  Vocal Fold Findings:  Right Vocal Fold: subtle reduction in muscle tone  Left Vocal Fold: subtle reduction in muscle tone     Primary complaint of patient today included:   Dysphonia and throat concerns     Therefore, we recommended a course of speech therapy to address these concerns.     Laryngeal function studies show significant increase in trans-glottal airflow compared to age and gender matched norms, increased inferred subglottal pressure, and an elevated AVQI.\"    SUBJECTIVE:  Since her last session, Ms. Dalal reports the following:   Overall she reports that symptoms are improving  Still has a globus sensation. Comes and goes.  Gargles and saline are helpful, but sensation comes back and maybe even exacerbated the issue.  Possible reflux symptoms are questioned. Sometimes wakes with a sour taste in the mouth.  Minimal benefit from raising the head of the bed.   She read that some people need apple cider vinegar to helps feel " "better.       OBJECTIVE:  Ms. Dalal presents today with the following:  VOICE:  Mulugeta states that today is a typical voice today, with clinician observing voice quality to be characterized as:  Roughness: Mild to moderate  Breathiness: Mild to moderate  Strain: WNL  Pitch:  F0/Habitual/Conversational speech:   informally judged as WNL      PATIENT REPORTED MEASURES:  Patient Supplied Answers To SLP QOL Questionnaire       No data to display              Patient Supplied Answers To VHI Questionnaire      8/15/2024     1:00 PM   Voice Handicap Index (VHI-10)   My voice makes it difficult for people to hear me 2   People have difficulty understanding me in a noisy room 2   My voice difficulties restrict my personal and social life.  2   I feel left out of conversations because of my voice 2   My voice problem causes me to lose income 0   I feel as though I have to strain to produce voice 1   The clarity of my voice is unpredictable 0   My voice problem upsets me 2   My voice makes me feel handicapped 2   People ask, \"What's wrong with your voice?\" 0   VHI-10 13     Patient Supplied Answers To CSI Questionnaire      8/15/2024     1:00 PM   Cough Severity Index (CSI)   My cough is worse when I lie down 0   My coughing problem causes me to restrict my personal and social life 0   I tend to avoid places because of my cough problem 0   I feel embarrassed because of my coughing problem 0   People ask, ''What's wrong?'' because I cough a lot 0   I run out of air when I cough 0   My coughing problem affects my voice 0   My coughing problem limits my physical activity 0   My coughing problem upsets me 0   People ask me if I am sick because I cough a lot 0   CSI Score 0       THERAPEUTIC ACTIVITIES  Demonstrated previous exercises.  demonstrated improved technique  appropriate redirection provided  instruction provided for increased level of complexity/difficulty  Exercises to promote optimal respiratory mechanics  she " "demonstrated clavicular/neck/shoulder involvement in inhalation  With clinician support, patient was able to demonstrate improved abdominal relaxation and engagement on inhalation  Optimal exhalation using inward engagement of the abdominal wall with no corresponding collapse of the upper chest cavity was trained using the pulsed \"sh\" task  [place] a respiratory pacing exercise; this was helpful  acceptable improvement in airflow and respiratory mechanics     Semi-Occluded Vocal Tract (SOVT) exercises instructed to reduce laryngeal tension, promote vocal fold pliability, and coordinate respiration and phonation  Sustained /m/ was found to be most facilitating   Sustained phonation, and voice vs. voiceless productions used to promote easy voicing and raise awareness of laryngeal tension  Ascending and descending glides utilized to promote vocal fold pliability  \"Messa di voce\", gradual crescendo and decrescendo to vary medial compression was also utilized to promote vocal fold pliability.  Instructed on the benefits of using these exercises for improved coordination of breath flow with phonation and tissue mobilization.  Instructed on the importance of using these exercises as a warm-up / cool down,  and to re-calibrate the voice throughout the day.     Exercises in techniques for improved airflow during phonation  Speech material with /ju/ glides was facilitating at the word level.  Progressed to easy onset/ flow, and blending phrases  Instructed with a descending 5th, as well as an arpeggio pattern; this was helpful.  [place] techniques to reduce glottal hernandez and improve breath flow; negative practice improved awareness today.     Resonant Voice Therapy (RVT) exercises to promote forward locus of resonance and optimized pattern of laryngeal adduction  Speech material that elicits a high, forward tongue position (/n/) was most facilitating  Syllable level using /m/ in alternation with cardinal vowels on sustained " pitches and speech inflection  Able to recognize improvement in quality and comfort     Counseling and Education:  Asked many questions about the nature of her symptoms, and I answered all of these thoroughly.  I provided an audio recording and handouts of today's therapeutic activities to facilitate practice.        ASSESSMENT/PLAN  PROGRESS TOWARD LONG TERM GOALS:   Adequate progress; too early for objective measures     IMPRESSIONS:  Chronic Throat Clearing (R68.89), Globus Sensation (R09.89), Laryngeal Hyperfunction (J38.7), and Dysphonia (R49.0). Ms. Dalal demonstrated good learning of therapeutic activities.      PLAN: I will see Ms. Dalal in 10/28, at which point we will continue therapy.   For practice goals see AVS.      Next Clinic Appt: 10/28    TOTAL SERVICE TIME: 60 minutes  TREATMENT (36904): 60 minutes  NO CHARGE FACILITY FEE (24758)    Shauna Muse M.M. (voice) MOTONIEL., CCC/SLP  Speech-Language Pathologist  Certificate of Vocology  [facility]  [phone]  [email or web address]  Pronouns: she/her

## 2024-09-05 NOTE — LETTER
"9/5/2024       RE: Teetee Dalal  [address]  [facility] [address]     Dear Colleague,    Thank you for referring your patient, Teetee Dalal, to the [facility] [place] at [facility]. Please see a copy of my visit note below.    [facility] VOICE CLINIC  THERAPY NOTE (CPT 36546) -IN PERSON    Patient: Teetee Dalal  Date of Service: 9/5/2024  # of Visits: 3  # of Therapy sessions: 2  Benefit & Certification period:   [facility] MEDICARE ADVANTAGE  Certification date from: 8/15/24  Certification date to: 11/13/24    Referring physician: Lloyd  Impressions from most recent evaluation (8/15/24):  \"IMPRESSIONS: Teetee Dalal is a 73 year old woman, presenting today with Chronic Throat Clearing (R68.89), Globus Sensation (R09.89), Laryngeal Hyperfunction (J38.7), and Dysphonia (R49.0), as evidenced by evaluation the results of the evaluation, laryngeal function studies, as well as the laryngeal exam.    Remarkable findings included:  Perceptual evaluation demonstrated:   Roughness: Mild to moderate  Breathiness: Mild to moderate  Strain: WNL  Pitch:  F0/Habitual/Conversational speech:   informally judged as WNL  Laryngeal exam demonstrated:   Supraglottic hyperfunction during connected speech tasks: moderate 4 way restriction  Vocal Fold Findings:  Right Vocal Fold: subtle reduction in muscle tone  Left Vocal Fold: subtle reduction in muscle tone     Primary complaint of patient today included:   Dysphonia and throat concerns     Therefore, we recommended a course of speech therapy to address these concerns.     Laryngeal function studies show significant increase in trans-glottal airflow compared to age and gender matched norms, increased inferred subglottal pressure, and an elevated AVQI.\"    SUBJECTIVE:  Since her last session, Ms. Dalal reports the following:   Overall she reports that symptoms are improving  Still has a globus sensation. " "Comes and goes.  Gargles and saline are helpful, but sensation comes back and maybe even exacerbated the issue.  Possible reflux symptoms are questioned. Sometimes wakes with a sour taste in the mouth.  Minimal benefit from raising the head of the bed.   She read that some people need apple cider vinegar to helps feel better.       OBJECTIVE:  Ms. Dalal presents today with the following:  VOICE:  Mulugeta states that today is a typical voice today, with clinician observing voice quality to be characterized as:  Roughness: Mild to moderate  Breathiness: Mild to moderate  Strain: WNL  Pitch:  F0/Habitual/Conversational speech:   informally judged as WNL      PATIENT REPORTED MEASURES:  Patient Supplied Answers To SLP QOL Questionnaire       No data to display              Patient Supplied Answers To VHI Questionnaire      8/15/2024     1:00 PM   Voice Handicap Index (VHI-10)   My voice makes it difficult for people to hear me 2   People have difficulty understanding me in a noisy room 2   My voice difficulties restrict my personal and social life.  2   I feel left out of conversations because of my voice 2   My voice problem causes me to lose income 0   I feel as though I have to strain to produce voice 1   The clarity of my voice is unpredictable 0   My voice problem upsets me 2   My voice makes me feel handicapped 2   People ask, \"What's wrong with your voice?\" 0   VHI-10 13     Patient Supplied Answers To CSI Questionnaire      8/15/2024     1:00 PM   Cough Severity Index (CSI)   My cough is worse when I lie down 0   My coughing problem causes me to restrict my personal and social life 0   I tend to avoid places because of my cough problem 0   I feel embarrassed because of my coughing problem 0   People ask, ''What's wrong?'' because I cough a lot 0   I run out of air when I cough 0   My coughing problem affects my voice 0   My coughing problem limits my physical activity 0   My coughing problem upsets me 0   People " "ask me if I am sick because I cough a lot 0   CSI Score 0       THERAPEUTIC ACTIVITIES  Demonstrated previous exercises.  demonstrated improved technique  appropriate redirection provided  instruction provided for increased level of complexity/difficulty  Exercises to promote optimal respiratory mechanics  she demonstrated clavicular/neck/shoulder involvement in inhalation  With clinician support, patient was able to demonstrate improved abdominal relaxation and engagement on inhalation  Optimal exhalation using inward engagement of the abdominal wall with no corresponding collapse of the upper chest cavity was trained using the pulsed \"sh\" task  [place] a respiratory pacing exercise; this was helpful  acceptable improvement in airflow and respiratory mechanics     Semi-Occluded Vocal Tract (SOVT) exercises instructed to reduce laryngeal tension, promote vocal fold pliability, and coordinate respiration and phonation  Sustained /m/ was found to be most facilitating   Sustained phonation, and voice vs. voiceless productions used to promote easy voicing and raise awareness of laryngeal tension  Ascending and descending glides utilized to promote vocal fold pliability  \"Messa di voce\", gradual crescendo and decrescendo to vary medial compression was also utilized to promote vocal fold pliability.  Instructed on the benefits of using these exercises for improved coordination of breath flow with phonation and tissue mobilization.  Instructed on the importance of using these exercises as a warm-up / cool down,  and to re-calibrate the voice throughout the day.     Exercises in techniques for improved airflow during phonation  Speech material with /ju/ glides was facilitating at the word level.  Progressed to easy onset/ flow, and blending phrases  Instructed with a descending 5th, as well as an arpeggio pattern; this was helpful.  [place] techniques to reduce glottal hernandez and improve breath flow; negative practice improved " awareness today.     Resonant Voice Therapy (RVT) exercises to promote forward locus of resonance and optimized pattern of laryngeal adduction  Speech material that elicits a high, forward tongue position (/n/) was most facilitating  Syllable level using /m/ in alternation with cardinal vowels on sustained pitches and speech inflection  Able to recognize improvement in quality and comfort     Counseling and Education:  Asked many questions about the nature of her symptoms, and I answered all of these thoroughly.  I provided an audio recording and handouts of today's therapeutic activities to facilitate practice.        ASSESSMENT/PLAN  PROGRESS TOWARD LONG TERM GOALS:   Adequate progress; too early for objective measures     IMPRESSIONS:  Chronic Throat Clearing (R68.89), Globus Sensation (R09.89), Laryngeal Hyperfunction (J38.7), and Dysphonia (R49.0). Ms. Dalal demonstrated good learning of therapeutic activities.      PLAN: I will see Ms. Dalal in 10/28, at which point we will continue therapy.   For practice goals see AVS.      Next Clinic Appt: 10/28    TOTAL SERVICE TIME: 60 minutes  TREATMENT (31437): 60 minutes  NO CHARGE FACILITY FEE (06328)    Shauna Muse M.M. (voice) MJenniferA., CCC/SLP  Speech-Language Pathologist  Certificate of Vocology  [facility]  [phone]  [email or web address]  Pronouns: she/her      Again, thank you for allowing me to participate in the care of your patient.      Sincerely,    Shauna Muse, SLP

## 2024-09-09 DIAGNOSIS — I10 HYPERTENSION GOAL BP (BLOOD PRESSURE) < 150/90: ICD-10-CM

## 2024-09-09 RX ORDER — LOSARTAN POTASSIUM 50 MG/1
50 TABLET ORAL DAILY
Qty: 90 TABLET | Refills: 0 | Status: SHIPPED | OUTPATIENT
Start: 2024-09-09

## 2024-09-23 ENCOUNTER — MYC MEDICAL ADVICE (OUTPATIENT)
Dept: FAMILY MEDICINE | Facility: CLINIC | Age: 74
End: 2024-09-23
Payer: COMMERCIAL

## 2024-09-25 ENCOUNTER — TELEPHONE (OUTPATIENT)
Dept: FAMILY MEDICINE | Facility: CLINIC | Age: 74
End: 2024-09-25
Payer: COMMERCIAL

## 2024-09-25 DIAGNOSIS — I10 HYPERTENSION GOAL BP (BLOOD PRESSURE) < 150/90: ICD-10-CM

## 2024-09-25 NOTE — TELEPHONE ENCOUNTER
Put in a request on mychart as she is out of her BP med but upon review on 9/9/24 losartan refilled for #90 . I asked if she had called her pharmacy and she said no so she will do that now    Martha SMYTH RN, BSN  [facility]

## 2024-09-25 NOTE — TELEPHONE ENCOUNTER
Pt called into the clinic and said she put in a request on mychart as she is out of her BP med but upon review on 9/9/24 losartan refilled for #90 . I asked if she had called her pharmacy and she said no so she will do that now     Martha SMYTH RN, BSN  [facility]

## 2024-09-30 ENCOUNTER — PATIENT OUTREACH (OUTPATIENT)
Dept: CARE COORDINATION | Facility: CLINIC | Age: 74
End: 2024-09-30
Payer: COMMERCIAL

## 2024-10-22 ENCOUNTER — OFFICE VISIT (OUTPATIENT)
Dept: FAMILY MEDICINE | Facility: CLINIC | Age: 74
End: 2024-10-22
Payer: COMMERCIAL

## 2024-10-22 VITALS
TEMPERATURE: 97.6 F | BODY MASS INDEX: 25.43 KG/M2 | HEIGHT: 62 IN | RESPIRATION RATE: 16 BRPM | SYSTOLIC BLOOD PRESSURE: 135 MMHG | DIASTOLIC BLOOD PRESSURE: 83 MMHG | OXYGEN SATURATION: 96 % | HEART RATE: 81 BPM | WEIGHT: 138.2 LBS

## 2024-10-22 DIAGNOSIS — Z00.00 ENCOUNTER FOR MEDICARE ANNUAL WELLNESS EXAM: Primary | ICD-10-CM

## 2024-10-22 DIAGNOSIS — Z12.31 VISIT FOR SCREENING MAMMOGRAM: ICD-10-CM

## 2024-10-22 DIAGNOSIS — Z78.0 POST-MENOPAUSAL: ICD-10-CM

## 2024-10-22 DIAGNOSIS — Z23 NEED FOR TDAP VACCINATION: ICD-10-CM

## 2024-10-22 DIAGNOSIS — E78.5 HYPERLIPIDEMIA LDL GOAL <130: ICD-10-CM

## 2024-10-22 DIAGNOSIS — Z29.11 NEED FOR VACCINATION AGAINST RESPIRATORY SYNCYTIAL VIRUS: ICD-10-CM

## 2024-10-22 DIAGNOSIS — R73.01 IMPAIRED FASTING BLOOD SUGAR: ICD-10-CM

## 2024-10-22 DIAGNOSIS — I10 HYPERTENSION GOAL BP (BLOOD PRESSURE) < 150/90: ICD-10-CM

## 2024-10-22 DIAGNOSIS — E55.9 VITAMIN D DEFICIENCY: ICD-10-CM

## 2024-10-22 DIAGNOSIS — R21 RASH: ICD-10-CM

## 2024-10-22 PROCEDURE — 99214 OFFICE O/P EST MOD 30 MIN: CPT | Mod: 25 | Performed by: NURSE PRACTITIONER

## 2024-10-22 PROCEDURE — G0439 PPPS, SUBSEQ VISIT: HCPCS | Performed by: NURSE PRACTITIONER

## 2024-10-22 RX ORDER — LOSARTAN POTASSIUM 50 MG/1
50 TABLET ORAL DAILY
Qty: 90 TABLET | Refills: 3 | Status: SHIPPED | OUTPATIENT
Start: 2024-10-22

## 2024-10-22 RX ORDER — TRIAMCINOLONE ACETONIDE 1 MG/G
OINTMENT TOPICAL 2 TIMES DAILY
Qty: 80 G | Refills: 2 | Status: SHIPPED | OUTPATIENT
Start: 2024-10-22

## 2024-10-22 SDOH — HEALTH STABILITY: PHYSICAL HEALTH: ON AVERAGE, HOW MANY DAYS PER WEEK DO YOU ENGAGE IN MODERATE TO STRENUOUS EXERCISE (LIKE A BRISK WALK)?: 2 DAYS

## 2024-10-22 ASSESSMENT — PATIENT HEALTH QUESTIONNAIRE - PHQ9
10. IF YOU CHECKED OFF ANY PROBLEMS, HOW DIFFICULT HAVE THESE PROBLEMS MADE IT FOR YOU TO DO YOUR WORK, TAKE CARE OF THINGS AT HOME, OR GET ALONG WITH OTHER PEOPLE: NOT DIFFICULT AT ALL
SUM OF ALL RESPONSES TO PHQ QUESTIONS 1-9: 0
SUM OF ALL RESPONSES TO PHQ QUESTIONS 1-9: 0

## 2024-10-22 ASSESSMENT — SOCIAL DETERMINANTS OF HEALTH (SDOH): HOW OFTEN DO YOU GET TOGETHER WITH FRIENDS OR RELATIVES?: TWICE A WEEK

## 2024-10-22 ASSESSMENT — PAIN SCALES - GENERAL: PAINLEVEL: NO PAIN (0)

## 2024-10-22 NOTE — PATIENT INSTRUCTIONS
Patient Education   Preventive Care Advice   This is general advice given by our system to help you stay healthy. However, your care team may have specific advice just for you. Please talk to your care team about your preventive care needs.  Nutrition  Eat 5 or more servings of fruits and vegetables each day.  Try wheat bread, brown rice and whole grain pasta (instead of white bread, rice, and pasta).  Get enough calcium and vitamin D. Check the label on foods and aim for 100% of the RDA (recommended daily allowance).  Lifestyle  Exercise at least 150 minutes each week  (30 minutes a day, 5 days a week).  Do muscle strengthening activities 2 days a week. These help control your weight and prevent disease.  No smoking.  Wear sunscreen to prevent skin cancer.  Have a dental exam and cleaning every 6 months.  Yearly exams  See your health care team every year to talk about:  Any changes in your health.  Any medicines your care team has prescribed.  Preventive care, family planning, and ways to prevent chronic diseases.  Shots (vaccines)   HPV shots (up to age 26), if you've never had them before.  Hepatitis B shots (up to age 59), if you've never had them before.  COVID-19 shot: Get this shot when it's due.  Flu shot: Get a flu shot every year.  Tetanus shot: Get a tetanus shot every 10 years.  Pneumococcal, hepatitis A, and RSV shots: Ask your care team if you need these based on your risk.  Shingles shot (for age 50 and up)  General health tests  Diabetes screening:  Starting at age 35, Get screened for diabetes at least every 3 years.  If you are younger than age 35, ask your care team if you should be screened for diabetes.  Cholesterol test: At age 39, start having a cholesterol test every 5 years, or more often if advised.  Bone density scan (DEXA): At age 50, ask your care team if you should have this scan for osteoporosis (brittle bones).  Hepatitis C: Get tested at least once in your life.  STIs (sexually  transmitted infections)  Before age 24: Ask your care team if you should be screened for STIs.  After age 24: Get screened for STIs if you're at risk. You are at risk for STIs (including HIV) if:  You are sexually active with more than one person.  You don't use condoms every time.  You or a partner was diagnosed with a sexually transmitted infection.  If you are at risk for HIV, ask about PrEP medicine to prevent HIV.  Get tested for HIV at least once in your life, whether you are at risk for HIV or not.  Cancer screening tests  Cervical cancer screening: If you have a cervix, begin getting regular cervical cancer screening tests starting at age 21.  Breast cancer scan (mammogram): If you've ever had breasts, begin having regular mammograms starting at age 40. This is a scan to check for breast cancer.  Colon cancer screening: It is important to start screening for colon cancer at age 45.  Have a colonoscopy test every 10 years (or more often if you're at risk) Or, ask your provider about stool tests like a FIT test every year or Cologuard test every 3 years.  To learn more about your testing options, visit:   .  For help making a decision, visit:   https://bit.ly/us69739.  Prostate cancer screening test: If you have a prostate, ask your care team if a prostate cancer screening test (PSA) at age 55 is right for you.  Lung cancer screening: If you are a current or former smoker ages 50 to 80, ask your care team if ongoing lung cancer screenings are right for you.  For informational purposes only. Not to replace the advice of your health care provider. Copyright   2023 [facility] [employer]. All rights reserved. Clinically reviewed by the [facility] Transitions Program. [employer] 269087 - REV 01/24.  Hearing Loss: Care Instructions  Overview     Hearing loss is a sudden or slow decrease in how well you hear. It can range from slight to profound. Permanent hearing loss can occur with aging. It also can  happen when you are exposed long-term to loud noise. Examples include listening to loud music, riding motorcycles, or being around other loud machines.  Hearing loss can affect your work and home life. It can make you feel lonely or depressed. You may feel that you have lost your independence. But hearing aids and other devices can help you hear better and feel connected to others.  Follow-up care is a key part of your treatment and safety. Be sure to make and go to all appointments, and call your doctor if you are having problems. It's also a good idea to know your test results and keep a list of the medicines you take.  How can you care for yourself at home?  Avoid loud noises whenever possible. This helps keep your hearing from getting worse.  Always wear hearing protection around loud noises.  Wear a hearing aid as directed.  A professional can help you pick a hearing aid that will work best for you.  You can also get hearing aids over the counter for mild to moderate hearing loss.  Have hearing tests as your doctor suggests. They can show whether your hearing has changed. Your hearing aid may need to be adjusted.  Use other devices as needed. These may include:  Telephone amplifiers and hearing aids that can connect to a television, stereo, radio, or microphone.  Devices that use lights or vibrations. These alert you to the doorbell, a ringing telephone, or a baby monitor.  Television closed-captioning. This shows the words at the bottom of the screen. Most new TVs can do this.  TTY (text telephone). This lets you type messages back and forth on the telephone instead of talking or listening. These devices are also called TDD. When messages are typed on the keyboard, they are sent over the phone line to a receiving TTY. The message is shown on a monitor.  Use text messaging, social media, and email if it is hard for you to communicate by telephone.  Try to learn a listening technique called speechreading. It is  "not lipreading. You pay attention to people's gestures, expressions, posture, and tone of voice. These clues can help you understand what a person is saying. Face the person you are talking to, and have them face you. Make sure the lighting is good. You need to see the other person's face clearly.  Think about counseling if you need help to adjust to your hearing loss.  When should you call for help?  Watch closely for changes in your health, and be sure to contact your doctor if:    You think your hearing is getting worse.     You have new symptoms, such as dizziness or nausea.   Where can you learn more?  Go to https://www.BULX.net/patiented  Enter R798 in the search box to learn more about \"Hearing Loss: Care Instructions.\"  Current as of: September 27, 2023  Content Version: 14.2 2024 [facility] [employer].   Care instructions adapted under license by your healthcare professional. If you have questions about a medical condition or this instruction, always ask your healthcare professional. [employer] disclaims any warranty or liability for your use of this information.       "

## 2024-10-23 ENCOUNTER — PATIENT OUTREACH (OUTPATIENT)
Dept: CARE COORDINATION | Facility: CLINIC | Age: 74
End: 2024-10-23
Payer: COMMERCIAL

## 2024-10-28 ENCOUNTER — VIRTUAL VISIT (OUTPATIENT)
Dept: OTOLARYNGOLOGY | Facility: CLINIC | Age: 74
End: 2024-10-28
Payer: COMMERCIAL

## 2024-10-28 ENCOUNTER — PATIENT OUTREACH (OUTPATIENT)
Dept: CARE COORDINATION | Facility: CLINIC | Age: 74
End: 2024-10-28

## 2024-10-28 DIAGNOSIS — R49.0 DYSPHONIA: Primary | ICD-10-CM

## 2024-10-28 DIAGNOSIS — J38.3 STRAIN OF VOCAL CORDS: ICD-10-CM

## 2024-10-28 PROCEDURE — 92507 TX SP LANG VOICE COMM INDIV: CPT | Mod: GN | Performed by: SPEECH-LANGUAGE PATHOLOGIST

## 2024-10-28 NOTE — PATIENT INSTRUCTIONS
"After Visit Summary    Patient: Teetee Dalal  Date of Visit: 10/28/2024    These notes are also available in your MyChart. Please take a few moments to find them under \"Past Appointments\" in the localbacon system.    \"Handouts\" that go along with today's order of activities include (below):     Frequency of practice: 2-3x/day unless marked otherwise    Order of today's appointment:    Spacious Speech Phrases  Frequency of practice:    Start with an open-throated breath (like with a sniff of something wonderful or the beginning of a yawn or a surprise breath).  Let the breath do the work.  Be light, fluid, legato, and spacious.  Exaggerate inflection!  Glide over your entire pitch range.  These should feel effortless in your throat.  This is like a massage for your vocal folds, stretching and vladimir the muscles, while vibrating in a spacious throat.    Heeeeeeeee   Haaaaaaaay   Hooooooooh   Huuuuuuuu   Haaaaaaaah     Hi there   How are you?   Who are you?   Who is she?   Who is he?   Who are they?   Hello, hello, hello        [email or web address]/sh-[email or web address]    Voice Tools - loudness DB level meter        OFELIA [email or web address].SIM, M.M., CCC-SLP  Speech-Language Pathologist - [facility] Voice [facility] Supervisor  [place] Certified Vocologist  [facility]  [email or web address]  she/her  "

## 2024-10-28 NOTE — PROGRESS NOTES
"Mulugeta is a 74 year old female who is being cared for via a billable virtual visit.        The patient/client has been notified and verbally consented to the following statements:   This video visit will be conducted between you and your provider.  If during the course of the call the provider feels a video visit is not appropriate, you will not be charged for this service.    Provider has received verbal consent for billable virtual visit from the patient? Yes    Preferred method for receiving information: Oodlehart     Call initiated at: 2:04 PM  Platform used to conduct today's virtual appointment: BALDEMAR Alex Video  Location of provider: Residence  Location of patient: Residence. State: [place]  # of Visits: 4  # of Therapy sessions: 3    Benefit & Certification period:   [facility] MEDICARE ADVANTAGE  Certification date from: 8/15/24  Certification date to: 11/13/24      [facility] VOICE CLINIC  THERAPY NOTE (CPT 55832)  Patient: Teetee Dalal  Date of Service: 10/28/2024  Referring physician: Lloyd  Impressions from most recent evaluation (8/15/24):  \"IMPRESSIONS: Teetee Dalal is a 73 year old woman, presenting today with Chronic Throat Clearing (R68.89), Globus Sensation (R09.89), Laryngeal Hyperfunction (J38.7), and Dysphonia (R49.0), as evidenced by evaluation the results of the evaluation, laryngeal function studies, as well as the laryngeal exam.    Remarkable findings included:  Perceptual evaluation demonstrated:   Roughness: Mild to moderate  Breathiness: Mild to moderate  Strain: WNL  Pitch:  F0/Habitual/Conversational speech:   informally judged as WNL  Laryngeal exam demonstrated:   Supraglottic hyperfunction during connected speech tasks: moderate 4 way restriction  Vocal Fold Findings:  Right Vocal Fold: subtle reduction in muscle tone  Left Vocal Fold: subtle reduction in muscle tone     Primary complaint of patient today included:   Dysphonia and throat concerns     Therefore, we recommended a course of speech therapy " "to address these concerns.     Laryngeal function studies show significant increase in trans-glottal airflow compared to age and gender matched norms, increased inferred subglottal pressure, and an elevated AVQI.\"         OBJECTIVE:  Ms. Dalal presents today with the following:  VOICE:  Mulugeta states that today is a typical voice today, with clinician observing voice quality to be characterized as:  Roughness: Mild to moderate  Breathiness: Mild to moderate  Strain: WNL  Pitch:  F0/Habitual/Conversational speech:   informally judged as WNL      PATIENT REPORTED MEASURES:  Patient Supplied Answers To SLP QOL Questionnaire       No data to display              Patient Supplied Answers To VHI Questionnaire      10/28/2024     1:51 PM   Voice Handicap Index (VHI-10)   My voice makes it difficult for people to hear me 2    People have difficulty understanding me in a noisy room 2    My voice difficulties restrict my personal and social life.  1    I feel left out of conversations because of my voice 1    My voice problem causes me to lose income 0    I feel as though I have to strain to produce voice 2    The clarity of my voice is unpredictable 2    My voice problem upsets me 2    My voice makes me feel handicapped 1    People ask, \"What's wrong with your voice?\" 0    VHI-10 13        Patient-reported       THERAPEUTIC ACTIVITIES  Demonstrated previous exercises.  demonstrated improved technique  appropriate redirection provided  instruction provided for increased level of complexity/difficulty    Exercises in techniques for improved airflow during phonation  Speech material with \"sh\" onsets was facilitating at the word level.  Progressed to easy onset/ flow and blending phrases  Instructed with a descending 5th, as well as an arpeggio pattern; this was helpful.  [place] techniques to reduce glottal hernandez and improve breath flow; negative practice improved awareness today.    Counseling and Education:  Asked many questions " about the nature of her symptoms, and I answered all of these thoroughly.   [place] concepts of post-operative voice use and care, to optimize healing.  A revised regimen for home practice was instructed.  I provided an AVS of today's therapeutic activities to facilitate practice.      ASSESSMENT/PLAN  PROGRESS TOWARD LONG TERM GOALS:   Adequate progress; please see above    IMPRESSIONS:  Chronic Throat Clearing (R68.89), Globus Sensation (R09.89), Laryngeal Hyperfunction (J38.7), and Dysphonia (R49.0). Ms. Dalal demonstrated good learning of therapeutic activities to optimize coordination between breath flow and phonation to achieve her target voice quality.    PLAN: I will see Ms. Dalal in December to continue therapy.  For practice goals see AVS.     Next Clinic Appt: 12/16  TOTAL SERVICE TIME:   Call Initiated at: 2:04 PM  Call Ended at: 3 pm           CPT Billing Codes:   TREATMENT OF SPEECH, LANGUAGE, VOICE, COMMUNICATION, and/or AUDITORY PROCESSING DISORDER (60186)    MEI Contreras., M.M., CCC-SLP  Speech-Language Pathologist  Located within [facility] Trained Vocologist  [facility]  [email or web address]  Pronouns: she/her      *this report was created in part through the use of computerized dictation software, and though reviewed following completion, some typographic errors may persist.  If there is confusion regarding any of this notes contents, please contact me for clarification

## 2024-10-28 NOTE — LETTER
"10/28/2024       RE: Teetee Dalal  [address]  [facility] [address]     Dear Colleague,    Thank you for referring your patient, Teetee Dalla, to the [facility] VOICE CLINIC [place] at [facility]. Please see a copy of my visit note below.    Mulugeta is a 74 year old female who is being cared for via a billable virtual visit.        The patient/client has been notified and verbally consented to the following statements:   This video visit will be conducted between you and your provider.  If during the course of the call the provider feels a video visit is not appropriate, you will not be charged for this service.    Provider has received verbal consent for billable virtual visit from the patient? Yes    Preferred method for receiving information: [employer]     Call initiated at: 2:04 PM  Platform used to conduct today's virtual appointment: AM Isai Video  Location of provider: Residence  Location of patient: Residence. State: [place]  # of Visits: 4  # of Therapy sessions: 3    Benefit & Certification period:   [facility] MEDICARE ADVANTAGE  Certification date from: 8/15/24  Certification date to: 11/13/24      [facility] VOICE CLINIC  THERAPY NOTE (CPT 57351)  Patient: Teetee Dalal  Date of Service: 10/28/2024  Referring physician: Lloyd  Impressions from most recent evaluation (8/15/24):  \"IMPRESSIONS: Teetee Dalal is a 73 year old woman, presenting today with Chronic Throat Clearing (R68.89), Globus Sensation (R09.89), Laryngeal Hyperfunction (J38.7), and Dysphonia (R49.0), as evidenced by evaluation the results of the evaluation, laryngeal function studies, as well as the laryngeal exam.    Remarkable findings included:  Perceptual evaluation demonstrated:   Roughness: Mild to moderate  Breathiness: Mild to moderate  Strain: WNL  Pitch:  F0/Habitual/Conversational speech:   informally judged as WNL  Laryngeal exam demonstrated:   Supraglottic " "hyperfunction during connected speech tasks: moderate 4 way restriction  Vocal Fold Findings:  Right Vocal Fold: subtle reduction in muscle tone  Left Vocal Fold: subtle reduction in muscle tone     Primary complaint of patient today included:   Dysphonia and throat concerns     Therefore, we recommended a course of speech therapy to address these concerns.     Laryngeal function studies show significant increase in trans-glottal airflow compared to age and gender matched norms, increased inferred subglottal pressure, and an elevated AVQI.\"         OBJECTIVE:  Ms. Dalal presents today with the following:  VOICE:  Mulugeta states that today is a typical voice today, with clinician observing voice quality to be characterized as:  Roughness: Mild to moderate  Breathiness: Mild to moderate  Strain: WNL  Pitch:  F0/Habitual/Conversational speech:   informally judged as WNL      PATIENT REPORTED MEASURES:  Patient Supplied Answers To SLP QOL Questionnaire       No data to display              Patient Supplied Answers To VHI Questionnaire      10/28/2024     1:51 PM   Voice Handicap Index (VHI-10)   My voice makes it difficult for people to hear me 2    People have difficulty understanding me in a noisy room 2    My voice difficulties restrict my personal and social life.  1    I feel left out of conversations because of my voice 1    My voice problem causes me to lose income 0    I feel as though I have to strain to produce voice 2    The clarity of my voice is unpredictable 2    My voice problem upsets me 2    My voice makes me feel handicapped 1    People ask, \"What's wrong with your voice?\" 0    VHI-10 13        Patient-reported       THERAPEUTIC ACTIVITIES  Demonstrated previous exercises.  demonstrated improved technique  appropriate redirection provided  instruction provided for increased level of complexity/difficulty    Exercises in techniques for improved airflow during phonation  Speech material with \"sh\" onsets " was facilitating at the word level.  Progressed to easy onset/ flow and blending phrases  Instructed with a descending 5th, as well as an arpeggio pattern; this was helpful.  [place] techniques to reduce glottal hernandez and improve breath flow; negative practice improved awareness today.    Counseling and Education:  Asked many questions about the nature of her symptoms, and I answered all of these thoroughly.   [place] concepts of post-operative voice use and care, to optimize healing.  A revised regimen for home practice was instructed.  I provided an AVS of today's therapeutic activities to facilitate practice.      ASSESSMENT/PLAN  PROGRESS TOWARD LONG TERM GOALS:   Adequate progress; please see above    IMPRESSIONS:  Chronic Throat Clearing (R68.89), Globus Sensation (R09.89), Laryngeal Hyperfunction (J38.7), and Dysphonia (R49.0). Ms. Dalal demonstrated good learning of therapeutic activities to optimize coordination between breath flow and phonation to achieve her target voice quality.    PLAN: I will see Ms. Dalal in December to continue therapy.  For practice goals see AVS.     Next Clinic Appt: 12/16  TOTAL SERVICE TIME:   Call Initiated at: 2:04 PM  Call Ended at: 3 pm           CPT Billing Codes:   TREATMENT OF SPEECH, LANGUAGE, VOICE, COMMUNICATION, and/or AUDITORY PROCESSING DISORDER (14876)    OFELIA [email or web address]., M.M., CCC-SLP  Speech-Language Pathologist  [facility] Trained Vocologist  [facility] Voice [facility]  [email or web address]  Pronouns: she/her      *this report was created in part through the use of computerized dictation software, and though reviewed following completion, some typographic errors may persist.  If there is confusion regarding any of this notes contents, please contact me for clarification            Again, thank you for allowing me to participate in the care of your patient.      Sincerely,    Shauna Muse SLP

## 2024-10-31 NOTE — TELEPHONE ENCOUNTER
FUTURE VISIT INFORMATION      FUTURE VISIT INFORMATION:  Date: 1/9/25  Time: 1:10pm  Location: [facility]  REFERRAL INFORMATION:  Referring provider:    Referring providers clinic:    Reason for visit/diagnosis  Right hearing loss (60 years - long standing     RECORDS REQUESTED FROM:       Clinic name Comments Records Status Imaging Status   [facility]  10/22/24, 1/11/24, 10/19/23 - ov w. Mery Desai, DNP  11/27/23- ov w. Natan Krishna, LICSW   Epic     [facility]  8/15/24- ov w. Shauna Muse, SLP  Epic

## 2024-12-16 ENCOUNTER — VIRTUAL VISIT (OUTPATIENT)
Dept: OTOLARYNGOLOGY | Facility: CLINIC | Age: 74
End: 2024-12-16
Payer: COMMERCIAL

## 2024-12-16 DIAGNOSIS — R49.0 DYSPHONIA: Primary | ICD-10-CM

## 2024-12-16 DIAGNOSIS — J38.3 STRAIN OF VOCAL CORDS: ICD-10-CM

## 2024-12-16 PROCEDURE — 92507 TX SP LANG VOICE COMM INDIV: CPT | Mod: GN | Performed by: SPEECH-LANGUAGE PATHOLOGIST

## 2024-12-16 NOTE — PROGRESS NOTES
"Mulugeta is a 74 year old female who is being cared for via a billable virtual visit.        The patient/client has been notified and verbally consented to the following statements:   This video visit will be conducted between you and your provider.  If during the course of the call the provider feels a video visit is not appropriate, you will not be charged for this service.    Provider has received verbal consent for billable virtual visit from the patient? Yes    Preferred method for receiving information: [employer]     Call initiated at: 9 AM  Platform used to conduct today's virtual appointment: AM Well Video  Location of provider: Residence  Location of patient: Residence. State: [place]  # of Visits: 5  # of Therapy sessions: 4    Benefit & Certification period:   [facility] MEDICARE ADVANTAGE   Certification date from: 10/28/24  Certification date to: 1/26/25      [facility] VOICE CLINIC  THERAPY NOTE (CPT 27070)  Patient: Teetee Dalal  Date of Service: 12/16/2024  Referring physician: Lloyd  Impressions from most recent evaluation (8/15/24):  \"IMPRESSIONS: Teetee Dalal is a 73 year old woman, presenting today with Chronic Throat Clearing (R68.89), Globus Sensation (R09.89), Laryngeal Hyperfunction (J38.7), and Dysphonia (R49.0), as evidenced by evaluation the results of the evaluation, laryngeal function studies, as well as the laryngeal exam.    Remarkable findings included:  Perceptual evaluation demonstrated:   Roughness: Mild to moderate  Breathiness: Mild to moderate  Strain: WNL  Pitch:  F0/Habitual/Conversational speech:   informally judged as WNL  Laryngeal exam demonstrated:   Supraglottic hyperfunction during connected speech tasks: moderate 4 way restriction  Vocal Fold Findings:  Right Vocal Fold: subtle reduction in muscle tone  Left Vocal Fold: subtle reduction in muscle tone     Primary complaint of patient today included:   Dysphonia and throat concerns     Therefore, we recommended a course of speech therapy to " "address these concerns.     Laryngeal function studies show significant increase in trans-glottal airflow compared to age and gender matched norms, increased inferred subglottal pressure, and an elevated AVQI.\"    SUBJECTIVE:  Since the last appointment, Ms. Dalal reports the following:   Overall she reports that symptoms are slowly improving  Working on her breathing and less on words    OBJECTIVE:  Ms. Dalal presents today with the following:  VOICE:  Mulugeta states that today is a typical voice today, with clinician observing voice quality to be characterized as:  Roughness: Mild to moderate  Breathiness: Mild to moderate  Strain: WNL  Pitch:  F0/Habitual/Conversational speech:   informally judged as WNL      PATIENT REPORTED MEASURES:  Patient Supplied Answers To SLP QOL Questionnaire       No data to display              Patient Supplied Answers To VHI Questionnaire      12/16/2024     9:04 AM   Voice Handicap Index (VHI-10)   My voice makes it difficult for people to hear me 2    People have difficulty understanding me in a noisy room 2    My voice difficulties restrict my personal and social life.  0    I feel left out of conversations because of my voice 2    My voice problem causes me to lose income 0    I feel as though I have to strain to produce voice 2    The clarity of my voice is unpredictable 2    My voice problem upsets me 2    My voice makes me feel handicapped 0    People ask, \"What's wrong with your voice?\" 0    VHI-10 12        Patient-reported     Throat discomfort: 3/10 - still 3/10     THERAPEUTIC ACTIVITIES  Demonstrated previous exercises.  demonstrated improved technique  appropriate redirection provided  instruction provided for increased level of complexity/difficulty    [place] exercises for upper body relaxation during phonation.  demonstrated moderate upper body tension  good self-awareness while completing stretches for the upper body and neck.  improvement in voice quality during " exercises    Manual laryngeal massage was performed:  Significant tenderness of the thyrohyoid space with corresponding reduction of space   Thyrohyoid space, greater horns of the hyoid, and base of tongue were targeted with gentle circular massage  Gentle lateralization of the larynx, hyoid tilt, and sternocleidomastoid massage was also performed.  Patient was trained to focus on intentional relaxation of jaw and tongue in addition to area of massage during these maneuvers.  Self-massage was instructed and patient was able to demonstrate this with acceptable accuracy  I recommended clinical massage therapy to further improve her symptoms.    Exercises in techniques for improved airflow during phonation  Speech material with /ju/ glides was facilitating at the word level.  Progressed to easy onset/ flow and blending phrases  Instructed with a descending 5th, as well as an arpeggio pattern; this was helpful.  [place] techniques to reduce glottal hernandez and improve breath flow; negative practice improved awareness today.    Counseling and Education:  Asked many questions about the nature of her symptoms, and I answered all of these thoroughly.   [place] concepts of post-operative voice use and care, to optimize healing.  A revised regimen for home practice was instructed.  I provided an AVS of today's therapeutic activities to facilitate practice.    ASSESSMENT/PLAN  PROGRESS TOWARD LONG TERM GOALS:   Adequate progress; too early for objective measures    IMPRESSIONS:  Chronic Throat Clearing (R68.89), Globus Sensation (R09.89), Laryngeal Hyperfunction (J38.7), and Dysphonia (R49.0). Ms. Dalal demonstrated good learning of therapeutic activities to improve coordination between breath flow and phonation, and reduce extrinsic neck tension.    PLAN: I will see Ms. Dalal in 4 weeks, at which point we will continue therapy.   For practice goals see AVS.     Next Clinic Appt: 1/20  Plan for SLP: continue therapy    TOTAL  SERVICE TIME:   Call Initiated at: 9:07 AM  Call Ended at: 10am           CPT Billing Codes:   TREATMENT OF SPEECH, LANGUAGE, VOICE, COMMUNICATION, and/or AUDITORY PROCESSING DISORDER (83219)    SASHA Contreras M.M., CCC-SLP  Speech-Language Pathologist  [facility] Trained Vocologist  [facility] Voice [facility]  [email or web address]  Pronouns: she/her      *this report was created in part through the use of computerized dictation software, and though reviewed following completion, some typographic errors may persist.  If there is confusion regarding any of this notes contents, please contact me for clarification

## 2024-12-16 NOTE — LETTER
"12/16/2024       RE: Teetee Dalal  [address]  [place] [address]     Dear Colleague,    Thank you for referring your patient, Teetee Dalal, to the [facility] VOICE CLINIC [place] at [facility]. Please see a copy of my visit note below.    Mulugeta is a 74 year old female who is being cared for via a billable virtual visit.        The patient/client has been notified and verbally consented to the following statements:   This video visit will be conducted between you and your provider.  If during the course of the call the provider feels a video visit is not appropriate, you will not be charged for this service.    Provider has received verbal consent for billable virtual visit from the patient? Yes    Preferred method for receiving information: [employer]     Call initiated at: 9 AM  Platform used to conduct today's virtual appointment: AM Well Video  Location of provider: Residence  Location of patient: Residence. State: [place]  # of Visits: 5  # of Therapy sessions: 4    Benefit & Certification period:   [employer] MEDICARE ADVANTAGE   Certification date from: 10/28/24  Certification date to: 1/26/25      [facility] VOICE CLINIC  THERAPY NOTE (CPT 18119)  Patient: Teetee Dalal  Date of Service: 12/16/2024  Referring physician: Lloyd  Impressions from most recent evaluation (8/15/24):  \"IMPRESSIONS: Teetee Dalal is a 73 year old woman, presenting today with Chronic Throat Clearing (R68.89), Globus Sensation (R09.89), Laryngeal Hyperfunction (J38.7), and Dysphonia (R49.0), as evidenced by evaluation the results of the evaluation, laryngeal function studies, as well as the laryngeal exam.    Remarkable findings included:  Perceptual evaluation demonstrated:   Roughness: Mild to moderate  Breathiness: Mild to moderate  Strain: WNL  Pitch:  F0/Habitual/Conversational speech:   informally judged as WNL  Laryngeal exam demonstrated:   Supraglottic hyperfunction " "during connected speech tasks: moderate 4 way restriction  Vocal Fold Findings:  Right Vocal Fold: subtle reduction in muscle tone  Left Vocal Fold: subtle reduction in muscle tone     Primary complaint of patient today included:   Dysphonia and throat concerns     Therefore, we recommended a course of speech therapy to address these concerns.     Laryngeal function studies show significant increase in trans-glottal airflow compared to age and gender matched norms, increased inferred subglottal pressure, and an elevated AVQI.\"    SUBJECTIVE:  Since the last appointment, Ms. Dalal reports the following:   Overall she reports that symptoms are slowly improving  Working on her breathing and less on words    OBJECTIVE:  Ms. Dalal presents today with the following:  VOICE:  Mulugeta states that today is a typical voice today, with clinician observing voice quality to be characterized as:  Roughness: Mild to moderate  Breathiness: Mild to moderate  Strain: WNL  Pitch:  F0/Habitual/Conversational speech:   informally judged as WNL      PATIENT REPORTED MEASURES:  Patient Supplied Answers To SLP QOL Questionnaire       No data to display              Patient Supplied Answers To VHI Questionnaire      12/16/2024     9:04 AM   Voice Handicap Index (VHI-10)   My voice makes it difficult for people to hear me 2    People have difficulty understanding me in a noisy room 2    My voice difficulties restrict my personal and social life.  0    I feel left out of conversations because of my voice 2    My voice problem causes me to lose income 0    I feel as though I have to strain to produce voice 2    The clarity of my voice is unpredictable 2    My voice problem upsets me 2    My voice makes me feel handicapped 0    People ask, \"What's wrong with your voice?\" 0    VHI-10 12        Patient-reported     Throat discomfort: 3/10 - still 3/10     THERAPEUTIC ACTIVITIES  Demonstrated previous exercises.  demonstrated improved " technique  appropriate redirection provided  instruction provided for increased level of complexity/difficulty    [place] exercises for upper body relaxation during phonation.  demonstrated moderate upper body tension  good self-awareness while completing stretches for the upper body and neck.  improvement in voice quality during exercises    Manual laryngeal massage was performed:  Significant tenderness of the thyrohyoid space with corresponding reduction of space   Thyrohyoid space, greater horns of the hyoid, and base of tongue were targeted with gentle circular massage  Gentle lateralization of the larynx, hyoid tilt, and sternocleidomastoid massage was also performed.  Patient was trained to focus on intentional relaxation of jaw and tongue in addition to area of massage during these maneuvers.  Self-massage was instructed and patient was able to demonstrate this with acceptable accuracy  I recommended clinical massage therapy to further improve her symptoms.    Exercises in techniques for improved airflow during phonation  Speech material with /ju/ glides was facilitating at the word level.  Progressed to easy onset/ flow and blending phrases  Instructed with a descending 5th, as well as an arpeggio pattern; this was helpful.  [place] techniques to reduce glottal hernandez and improve breath flow; negative practice improved awareness today.    Counseling and Education:  Asked many questions about the nature of her symptoms, and I answered all of these thoroughly.   [place] concepts of post-operative voice use and care, to optimize healing.  A revised regimen for home practice was instructed.  I provided an AVS of today's therapeutic activities to facilitate practice.    ASSESSMENT/PLAN  PROGRESS TOWARD LONG TERM GOALS:   Adequate progress; too early for objective measures    IMPRESSIONS:  Chronic Throat Clearing (R68.89), Globus Sensation (R09.89), Laryngeal Hyperfunction (J38.7), and Dysphonia (R49.0). Ms. Dalal  demonstrated good learning of therapeutic activities to improve coordination between breath flow and phonation, and reduce extrinsic neck tension.    PLAN: I will see Ms. Dalal in 4 weeks, at which point we will continue therapy.   For practice goals see AVS.     Next Clinic Appt: 1/20  Plan for SLP: continue therapy    TOTAL SERVICE TIME:   Call Initiated at: 9:07 AM  Call Ended at: 10am           CPT Billing Codes:   TREATMENT OF SPEECH, LANGUAGE, VOICE, COMMUNICATION, and/or AUDITORY PROCESSING DISORDER (24292)    OFELIA [email or web address].SIM, M.M., CCC-SLP  Speech-Language Pathologist  [facility] Trained Vocologist  [facility]  [email or web address]  Pronouns: she/her      *this report was created in part through the use of computerized dictation software, and though reviewed following completion, some typographic errors may persist.  If there is confusion regarding any of this notes contents, please contact me for clarification            Again, thank you for allowing me to participate in the care of your patient.      Sincerely,    Shauna Muse, SLP

## 2024-12-16 NOTE — PATIENT INSTRUCTIONS
"After Visit Summary    Patient: Teetee Dalal  Date of Visit: 12/16/2024    These notes are also available in your MyChart. Please take a few moments to find them under \"Past Appointments\" in the [employer] system.    \"Handouts\" that go along with today's order of activities include (below):     Frequency of practice: 2-3x/day unless marked otherwise    Order of today's appointment:  Stretches  Frequency of practice:    2nd Stretch:  Five Way Neck Stretch    Inhale and exhale bringing chin to chest.  Slowly roll to the right side.   Take left arm and place behind your back.  Relax your shoulder down feeling a stretch in your neck.  Inhale, feeling an added pull stretch in your neck and exhale on a 'shhhh', repeat.  Bring your nose to your shoulder; right ear by right shoulder, not moving anything else.  Inhale and exhale on a 'shhh'.  Release arm and bring chin to chest.  Slowly roll your neck to the left side.   Take right arm and place behind your back.   Inhale feeling an added pull stretch in your neck and exhale on a 'shhhh', repeat.  Again, bring your nose to your shoulder, left ear by left shoulder.  Inhale and exhale on a 'shhh'.  Release arm and bring your chin back to your chest.   Sit up really nice and tall, bringing your chin as close to your chest as you can.   Inhale and exhale with a 'shhh', repeat.   Slowly roll head back up.  Do these stretches    per day.    Places we feel stretched:   Neck   Upper back   Sternocleidomastoid Muscle: assists in breathing; lifts rib cage.     Scalene Muscle: assists in breathing.       Roge Bradford Jr., ADALID Ventura M.D.            Jody Quinonez, Ph.D.  Shauna Muse M.M., M.A.                 Jimmy Claudio M.M., M.A.                    [phone]       Laryngeal Massage    Tongue Base Massage             1-2  X per day  Using the knuckle of your index finger or thumb, begin massaging in small, gentle circles right under the chin. "  You can also hold your chin with your index finger and use your thumb.  Work along the sides, middle, and tip of the bottom of the chin with a gentle pressure.    It is important to keep the chin level or down to avoid putting strain on the neck muscles.    Laryngeal Massage   Using the index finger, find the [place]'s / Nat's apple.  From there, slide the index finger and thumb along the side of the larynx until you find the thyrohyoid space. These are little pockets on each side of the larynx.  With a gentle pressure, massage in circles around that area.  After a few circles, with your fingers still in the thyrohyoid space, rock your thumb and index finger up and down alternately; much like a see-saw motion.    Next use your thumb and index finger to lift gently and hold this position for a moment, and then gently pull down and hold this position for a moment.  You may also move the main bulk of the larynx side to side.  You may experience a slight  crunchy  (a.k.a. crepitus) quality to this movement.  This should not be a cause for concern, and unless it is causing discomfort, you should be able to continue this massage.  Once things feel loosened up, give a slight squeeze in the thyrohyoid space, and pull down and forward in a V shape towards the front of the trachea.    Sternocleidomastoid Massage  Starting directly below the ears, use your hands to massage in circles. Work your way down the sides of the neck, and into the shoulders. Work back up the side of the neck, and then work down the front of the neck.    You may use slightly more pressure for this massage, but stop if there is pain / throbbing.  Try turning your head to be better able to find these sternocleidomastoid muscles.  Use your hands to pull down along these muscles from below the ear to the shoulders, and from below the ear in a V shape towards the front.  Use your fingers to gently massage the  little twiggies  (a.k.a.: sternal origin of the  Sternocleidomastoid Muscle that attaches to the medial end of the clavicle/collarbone) one at a time.    Warm Compress              1-2  X per day  After an especially vocally fatiguing day, alternate massages with a compress.      Spacious Speech Phrases  Frequency of practice:    Start with an open-throated breath (like with a sniff of something wonderful or the beginning of a yawn or a surprise breath).  Let the breath do the work.  Be light, fluid, legato, and spacious.  Exaggerate inflection!  Glide over your entire pitch range.  These should feel effortless in your throat.  This is like a massage for your vocal folds, stretching and vladimir the muscles, while vibrating in a spacious throat.    Heeeeeeeee   Haaaaaaaay   Hooooooooh   Huuuuuuuu   Haaaaaaaah     Hi there   How are you?   Who are you?   Who is she?   Who is he?   Who are they?   Hello, hello, hello         Phrases for Blending  Fall_over_the_chair                      Go_(w)into the store  Leave_on_the_lights                     The(e)_(y)only one  See_it_over_there                         The(e)_(y)other day  Do_it_now       Not_even her mom  Put(d)_on_your_shoes         Not_any more  Down_under_the_stairs   Cold_as ice  Not_old_enough    the ice is cold  Look_at_the_sky    he's (z)ill with the flu  The_end_of_the_story  yes_and no  High_up_on_the_shelf  one at a time    Tongue Exercises    Perform __1-2______ repetitions of the following exercises __1-2______ times per day.    Instructions:  Stick your tongue straight out as far as you can.  Hold for a 10 seconds.  Raise the tip of your tongue towards your nose.  Hold for a 10 seconds.  Try to touch your chin with the tip of your tongue.  Hold for a 10 seconds.  Stick your tongue out and move it to touch the right corner of your lips and then the left corner of your lips.  10x side to side. Do not let the jaw follow the tongue motion (use a hand if necessary).  Press your entire tongue on the  roof of your mouth and hold it there as you slowly open your mouth.  Lick your top and bottom upper and lower front gums circular motion, then reverse.  Click your tongue on the roof of your mouth (release jaw and then the tongue).  Say mk-en-qz-la-la, ku-ea-vb-ta-ta, and oh-mw-ja-ka-ka as quickly and as accurately as you can (without moving your jaw).  Say marylou marylou marylou marylou as quickly and accurately as you can. ivana Olivares kitty.  Speak the months of the year.  Read aloud with your tongue out.  At least touching the lower lips    - Skip if a particular exercise does not feel helpful.    Clinical Massage Therapist: Janae Bertrand  [phone]  [email or web address]    SASHA Contreras M.M., CCC-SLP  Speech-Language Pathologist - [facility] Supervisor  [facility] Certified Vocologist  [facility]  [email or web address]  she/her

## 2024-12-21 ENCOUNTER — HEALTH MAINTENANCE LETTER (OUTPATIENT)
Age: 74
End: 2024-12-21

## 2025-01-02 DIAGNOSIS — H91.91 HEARING LOSS IN RIGHT EAR: Primary | ICD-10-CM

## 2025-01-09 ENCOUNTER — TELEPHONE (OUTPATIENT)
Dept: OTOLARYNGOLOGY | Facility: CLINIC | Age: 75
End: 2025-01-09

## 2025-01-09 ENCOUNTER — OFFICE VISIT (OUTPATIENT)
Dept: AUDIOLOGY | Facility: CLINIC | Age: 75
End: 2025-01-09
Payer: COMMERCIAL

## 2025-01-09 ENCOUNTER — PRE VISIT (OUTPATIENT)
Dept: OTOLARYNGOLOGY | Facility: CLINIC | Age: 75
End: 2025-01-09

## 2025-01-09 DIAGNOSIS — H90.3 SENSORINEURAL HEARING LOSS (SNHL), BILATERAL: Primary | ICD-10-CM

## 2025-01-09 DIAGNOSIS — H91.91 HEARING LOSS IN RIGHT EAR: ICD-10-CM

## 2025-01-09 NOTE — PROGRESS NOTES
AUDIOLOGY REPORT    SUMMARY: Audiology visit completed. See audiogram for results.      RECOMMENDATIONS: The patient's ENT appointment is cancelled per her request as she is no longer interested in discussing a CI.    Jess Lino, CCC-A  Clinical Audiologist  [place] #[id number]

## 2025-01-09 NOTE — TELEPHONE ENCOUNTER
Patient confirmed scheduled appointment:     Date: 1/14/25  Time: 9:40am  Appointment Type: New Ear  Provider: Jessi Lopez NP   Location: [facility]  Testing/imaging:   Additional Notes:   rescheduled from 1/9/25

## 2025-01-20 ENCOUNTER — MYC REFILL (OUTPATIENT)
Dept: FAMILY MEDICINE | Facility: CLINIC | Age: 75
End: 2025-01-20

## 2025-01-20 DIAGNOSIS — F32.0 CURRENT MILD EPISODE OF MAJOR DEPRESSIVE DISORDER WITHOUT PRIOR EPISODE: ICD-10-CM

## 2025-01-20 RX ORDER — VENLAFAXINE HYDROCHLORIDE 37.5 MG/1
37.5 CAPSULE, EXTENDED RELEASE ORAL DAILY
Qty: 90 CAPSULE | Refills: 1 | Status: SHIPPED | OUTPATIENT
Start: 2025-01-20

## 2025-01-23 ENCOUNTER — TRANSFERRED RECORDS (OUTPATIENT)
Dept: HEALTH INFORMATION MANAGEMENT | Facility: CLINIC | Age: 75
End: 2025-01-23
Payer: COMMERCIAL

## 2025-01-25 ENCOUNTER — HEALTH MAINTENANCE LETTER (OUTPATIENT)
Age: 75
End: 2025-01-25

## 2025-02-19 ENCOUNTER — VIRTUAL VISIT (OUTPATIENT)
Dept: OTOLARYNGOLOGY | Facility: CLINIC | Age: 75
End: 2025-02-19
Payer: COMMERCIAL

## 2025-02-19 DIAGNOSIS — R49.0 DYSPHONIA: Primary | ICD-10-CM

## 2025-02-19 NOTE — LETTER
"2/19/2025       RE: Teetee Dalal  [address]  [facility] [facility] [place] [address]     Dear Colleague,    Thank you for referring your patient, Teetee Dalal, to the [facility] [place] [facility]. Please see a copy of my visit note below.    Mulugeta is a 74 year old female who is being cared for via a billable virtual visit.        The patient/client has been notified and verbally consented to the following statements:   This video visit will be conducted between you and your provider.  If during the course of the call the provider feels a video visit is not appropriate, you will not be charged for this service.    Provider has received verbal consent for billable virtual visit from the patient? Yes    Preferred method for receiving information: [employer]     Call initiated at: 9:02 AM  Platform used to conduct today's virtual appointment: AM Isai Video  Location of provider: Residence  Location of patient: Residence. 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    Remarkable findings included:  Perceptual evaluation demonstrated:   Roughness: Mild to moderate  Breathiness: Mild to moderate  Strain: WNL  Pitch:  F0/Habitual/Conversational speech:   informally judged as WNL  Laryngeal exam demonstrated:   Supraglottic hyperfunction " "during connected speech tasks: moderate 4 way restriction  Vocal Fold Findings:  Right Vocal Fold: subtle reduction in muscle tone  Left Vocal Fold: subtle reduction in muscle tone     Primary complaint of patient today included:   Dysphonia and throat concerns     Therefore, we recommended a course of speech therapy to address these concerns.     Laryngeal function studies show significant increase in trans-glottal airflow compared to age and gender matched norms, increased inferred subglottal pressure, and an elevated AVQI.\"    SUBJECTIVE:  Since the last appointment, Ms. Dalal reports the following:   Overall she reports that symptoms are stable  Modified massage and stretches have been helpful, but she has questions about some of the techniques and would like to go over those activities.   Talking for extended periods of time creates irritation/ sore.     OBJECTIVE:  Ms. Dalal presents today with the following:  VOICE:  Mulugeta states that today is a typical voice today, with clinician observing voice quality to be characterized as:  Roughness: Mild to moderate  Breathiness: Mild to moderate  Strain: WNL  Pitch:  F0/Habitual/Conversational speech:   informally judged as WNL      PATIENT REPORTED MEASURES:  Patient Supplied Answers To SLP QOL Questionnaire       No data to display              Patient Supplied Answers To VHI Questionnaire      2/19/2025     8:30 AM   Voice Handicap Index (VHI-10)   My voice makes it difficult for people to hear me 1   People have difficulty understanding me in a noisy room 2   My voice difficulties restrict my personal and social life.  2   I feel left out of conversations because of my voice 2   My voice problem causes me to lose income 0   I feel as though I have to strain to produce voice 2   The clarity of my voice is unpredictable 2   My voice problem upsets me 2   My voice makes me feel handicapped 1   People ask, \"What's wrong with your voice?\" 0   VHI-10 14        " "Patient-reported       THERAPEUTIC ACTIVITIES  Demonstrated previous exercises.  demonstrated improved technique  appropriate redirection provided  instruction provided for increased level of complexity/difficulty    Semi-Occluded Vocal Tract (SOVT) exercises instructed to reduce laryngeal tension, promote vocal fold pliability, and coordinate respiration and phonation  Sustained /m/ was found to be most facilitating   Sustained phonation, and voice vs. voiceless productions used to promote easy voicing and raise awareness of laryngeal tension  Ascending and descending glides utilized to promote vocal fold pliability  \"Messa di voce\", gradual crescendo and decrescendo to vary medial compression was also utilized to promote vocal fold pliability.  Instructed on the benefits of using these exercises for improved coordination of breath flow with phonation and tissue mobilization.  Instructed on the importance of using these exercises as a warm-up / cool down,  and to re-calibrate the voice throughout the day.    Resonant Voice Therapy (RVT) exercises to promote forward locus of resonance and optimized pattern of laryngeal adduction  Speech material that elicits a high, forward tongue position (/n/) was most facilitating  Easy descending glide on /m/ utilized in conjunction with relaxed jaw, tongue, and lightly closed lips to facilitate forward resonant sound  Use of the carrier phrase \"mmhmm\" instructed to promote generalization to everyday speech  Syllable level using /m/ in alternation with cardinal vowels on sustained pitches and speech inflection  Word level exercises featuring nasal continuant loaded stimuli  Phrase level exercises featuring nasal continuants in more complex phonemic contexts were employed  Instructed with and interval of a descending 5th, as well as an arpeggio pattern was facilitating, prior to progressing to comfortable speech using optimal breath flow.  Able to recognize improvement in quality " and comfort    [place] exercises for upper body relaxation during phonation.  demonstrated moderate upper body tension  good self-awareness while completing stretches for the upper body and neck.  improvement in voice quality during exercises     [place] exercises for base of tongue relaxation during phonation:  Demonstrated moderate base of tongue tension, and mild to moderate jaw tension.    Applied techniques with use of a paragraph to work on reducing base of tongue tension during connected speech.    Good self-awareness while completing stretches for the base of tongue, and improving independence from lower jaw movement.        Improvement in voice quality during exercises was achieved, but pt experience some fatigue while completing the exercises today.  I encouraged her to modify her practice if the fatigue/ discomfort persists in the extrinsic neck muscles, as well as completing the massage techniques learned and use of a warm compress.        Counseling and Education:  Asked many questions about the nature of her symptoms, and I answered all of these thoroughly.  I recommended that she consider pursuing clinical massage therapy           [place] concepts of post-operative voice use and care, to optimize healing.  A revised regimen for home practice was instructed.  I provided an AVS of today's therapeutic activities to facilitate practice.     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  Ms. Dalal demonstrated good learning of therapeutic activities to optimize her voice quality.  She demonstrated good learning of exercises to reduce extrinsic and base of tongue tension, as well as flow phonation activities up to the phrase level.  She demonstrated good learning and will continue to work with the techniques daily.      PLAN: I will  see Ms. Dalal in 3 weeks, at which point we will modify resonant voice activities and reassess her progress and how to proceed with therapy.  For practice goals see AVS.     Next Clinic Appt: 3/19    TOTAL SERVICE TIME:   Call Initiated at: 9:02 AM  Call Ended at: 10am           CPT Billing Codes:   TREATMENT OF SPEECH, LANGUAGE, VOICE, COMMUNICATION, and/or AUDITORY PROCESSING DISORDER (35477)    OFELIA Contreras.SIM MTONI., CCC-SLP  Speech-Language Pathologist  [facility] Trained Vocologist  [facility]  [email or web address]  Pronouns: she/her      *this report was created in part through the use of computerized dictation software, and though reviewed following completion, some typographic errors may persist.  If there is confusion regarding any of this notes contents, please contact me for clarification            Again, thank you for allowing me to participate in the care of your patient.      Sincerely,    Shauna Muse, SLP

## 2025-02-19 NOTE — PATIENT INSTRUCTIONS
"After Visit Summary    Patient: Teetee Dalal  Date of Visit: 2/19/2025    These notes are also available in your MyChart. Please take a few moments to find them under \"Past Appointments\" in the [employer] system.    \"Handouts\" that go along with today's order of activities include (below):     Frequency of practice: 1-2x/day unless marked otherwise    Order of today's appointment:  Forward Resonant /m/ sound      WHY:  Though these exercises seems (and feels) silly they are helpful for a number of reasons.  First, they make you use your air generously and consistently, helping you to coordinate your breath and your voice.  Second, they lengthen and narrow the vocal tract.  This narrowing (or semi-occlusion in scientific terms) creates back pressure in your throat which has been shown to help the vocal folds vibrate more easily and reduce how hard some of the other muscles are squeezing.    HOW:    Use a gentle  mmmm  like you are relaxing into a comfy chair at the end of a long day.   Make sure that your jaw is released (never clenched), and that your tongue is hanging out like a rug on the floor of your mouth  There can be a tiny  h  at the start of it to keep you from getting too tight.  Start off with the  sigh  pattern listed below.    Feel how open and relaxed your throat feels when you practice these sounds. If it gets tight, don't sweat it.  Just breath, relax, and start again.  Across all the exercises make sure you are getting a nice low breath and feeling the steady inward motion of low abdominal muscles when making sound.      Practice 5 times a day for no more than 3 minutes, and whenever you feel fatigued.    Aren't sure if you are doing it right? Ask yourself these three questions:  Does it feel easy?  Are the bubbles and voice consistent?  Do you feel that forward buzz?            What sounds to make:    Single pitches - use any comfortable pitch and sustain the note for as long as it feels free and " easy, and your lips or tongue are bubbling.        Sighs - glide from high to low like you are sitting down in a comfortable chair after a long day of work        [place] - Start on a medium pitch and glide up just a bit and back down        *DO NOT WORRY ABOUT THE BREAK IN YOUR VOICE. It is natural.           (h)My mother makes much money.  Janiya made lemon jam.  Many mice munch on melons.  Jing meets me at the market.  Monogamy is matrimonious   Many moon rocks are mined [place].  Mudpies are made in mud.    Chandrika jay loved the water./  Many members of her family/ had been in the [employer]./  Her uncle was a fisherman. / He remembered many moments/ netting tuna in the pouring rain. / When Chandrika was a little girl,/ she dangled slimy worms to catch sunfish.      Nothing was more fun than watching the sunfish nibble the worms.  When she was [age], Chandrika learned to sail.  She named her boat Chiquita.  Sometimes, her best friend, Chelita Rodriguez, went with her.  Their mothers never minded their sailing.  When the dinner tejada rang, Chelita and Chandrika always came home.        MEI Contreras., M.M., CCC-SLP  Speech-Language Pathologist - [facility] Supervisor  [facility] Certified Vocologist  [facility]  [email or web address]  she/her

## 2025-02-19 NOTE — PROGRESS NOTES
"Mulugeta is a 74 year old female who is being cared for via a billable virtual visit.        The patient/client has been notified and verbally consented to the following statements:   This video visit will be conducted between you and your provider.  If during the course of the call the provider feels a video visit is not appropriate, you will not be charged for this service.    Provider has received verbal consent for billable virtual visit from the patient? Yes    Preferred method for receiving information: [employer]     Call initiated at: 9:02 AM  Platform used to conduct today's virtual appointment: AM [employer] Video  Location of provider: Residence  Location of patient: Residence. State: [place]  # of Visits: 7  # of Therapy sessions: 6     Benefit & Certification period:   [facility] MEDICARE ADVANTAGE   Certification date from: 1/20/25  Certification date to: 4/20/25      [facility] VOICE CLINIC  THERAPY NOTE (CPT 41817)  Patient: Teetee Dalal  Date of Service: 2/19/2025  Referring physician: Lloyd  Impressions from most recent evaluation (8/15/24):  \"IMPRESSIONS: Teetee Dalal is a 73 year old woman, presenting today with Chronic Throat Clearing (R68.89), Globus Sensation (R09.89), Laryngeal Hyperfunction (J38.7), and Dysphonia (R49.0), as evidenced by evaluation the results of the evaluation, laryngeal function studies, as well as the laryngeal exam.    Remarkable findings included:  Perceptual evaluation demonstrated:   Roughness: Mild to moderate  Breathiness: Mild to moderate  Strain: WNL  Pitch:  F0/Habitual/Conversational speech:   informally judged as WNL  Laryngeal exam demonstrated:   Supraglottic hyperfunction during connected speech tasks: moderate 4 way restriction  Vocal Fold Findings:  Right Vocal Fold: subtle reduction in muscle tone  Left Vocal Fold: subtle reduction in muscle tone     Primary complaint of patient today included:   Dysphonia and throat concerns     Therefore, we recommended a course of speech therapy " "to address these concerns.     Laryngeal function studies show significant increase in trans-glottal airflow compared to age and gender matched norms, increased inferred subglottal pressure, and an elevated AVQI.\"    SUBJECTIVE:  Since the last appointment, Ms. Dalal reports the following:   Overall she reports that symptoms are stable  Modified massage and stretches have been helpful, but she has questions about some of the techniques and would like to go over those activities.   Talking for extended periods of time creates irritation/ sore.     OBJECTIVE:  Ms. Dalal presents today with the following:  VOICE:  Mulugeta states that today is a typical voice today, with clinician observing voice quality to be characterized as:  Roughness: Mild to moderate  Breathiness: Mild to moderate  Strain: WNL  Pitch:  F0/Habitual/Conversational speech:   informally judged as WNL      PATIENT REPORTED MEASURES:  Patient Supplied Answers To SLP QOL Questionnaire       No data to display              Patient Supplied Answers To VHI Questionnaire      2/19/2025     8:30 AM   Voice Handicap Index (VHI-10)   My voice makes it difficult for people to hear me 1   People have difficulty understanding me in a noisy room 2   My voice difficulties restrict my personal and social life.  2   I feel left out of conversations because of my voice 2   My voice problem causes me to lose income 0   I feel as though I have to strain to produce voice 2   The clarity of my voice is unpredictable 2   My voice problem upsets me 2   My voice makes me feel handicapped 1   People ask, \"What's wrong with your voice?\" 0   VHI-10 14        Patient-reported       THERAPEUTIC ACTIVITIES  Demonstrated previous exercises.  demonstrated improved technique  appropriate redirection provided  instruction provided for increased level of complexity/difficulty    Semi-Occluded Vocal Tract (SOVT) exercises instructed to reduce laryngeal tension, promote vocal fold " "pliability, and coordinate respiration and phonation  Sustained /m/ was found to be most facilitating   Sustained phonation, and voice vs. voiceless productions used to promote easy voicing and raise awareness of laryngeal tension  Ascending and descending glides utilized to promote vocal fold pliability  \"Messa di voce\", gradual crescendo and decrescendo to vary medial compression was also utilized to promote vocal fold pliability.  Instructed on the benefits of using these exercises for improved coordination of breath flow with phonation and tissue mobilization.  Instructed on the importance of using these exercises as a warm-up / cool down,  and to re-calibrate the voice throughout the day.    Resonant Voice Therapy (RVT) exercises to promote forward locus of resonance and optimized pattern of laryngeal adduction  Speech material that elicits a high, forward tongue position (/n/) was most facilitating  Easy descending glide on /m/ utilized in conjunction with relaxed jaw, tongue, and lightly closed lips to facilitate forward resonant sound  Use of the carrier phrase \"mmhmm\" instructed to promote generalization to everyday speech  Syllable level using /m/ in alternation with cardinal vowels on sustained pitches and speech inflection  Word level exercises featuring nasal continuant loaded stimuli  Phrase level exercises featuring nasal continuants in more complex phonemic contexts were employed  Instructed with and interval of a descending 5th, as well as an arpeggio pattern was facilitating, prior to progressing to comfortable speech using optimal breath flow.  Able to recognize improvement in quality and comfort    [place] exercises for upper body relaxation during phonation.  demonstrated moderate upper body tension  good self-awareness while completing stretches for the upper body and neck.  improvement in voice quality during exercises     [place]mer exercises for base of tongue relaxation during " phonation:  Demonstrated moderate base of tongue tension, and mild to moderate jaw tension.    Applied techniques with use of a paragraph to work on reducing base of tongue tension during connected speech.    Good self-awareness while completing stretches for the base of tongue, and improving independence from lower jaw movement.        Improvement in voice quality during exercises was achieved, but pt experience some fatigue while completing the exercises today.  I encouraged her to modify her practice if the fatigue/ discomfort persists in the extrinsic neck muscles, as well as completing the massage techniques learned and use of a warm compress.        Counseling and Education:  Asked many questions about the nature of her symptoms, and I answered all of these thoroughly.  I recommended that she consider pursuing clinical massage therapy           [place] concepts of post-operative voice use and care, to optimize healing.  A revised regimen for home practice was instructed.  I provided an AVS of today's therapeutic activities to facilitate practice.     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  Ms. Dalal demonstrated good learning of therapeutic activities to optimize her voice quality.  She demonstrated good learning of exercises to reduce extrinsic and base of tongue tension, as well as flow phonation activities up to the phrase level.  She demonstrated good learning and will continue to work with the techniques daily.      PLAN: I will see Ms. Dalal in 3 weeks, at which point we will modify resonant voice activities and reassess her progress and how to proceed with therapy.  For practice goals see AVS.     Next Clinic Appt: 3/19    TOTAL SERVICE TIME:   Call Initiated at: 9:02 AM  Call Ended at: 10am           CPT Billing Codes:    TREATMENT OF SPEECH, LANGUAGE, VOICE, COMMUNICATION, and/or AUDITORY PROCESSING DISORDER (78602)    SASHA Contreras MTONI., CCC-SLP  Speech-Language Pathologist  [facility] Trained Vocologist  [facility] Voice [facility]  [email or web address]  Pronouns: she/her      *this report was created in part through the use of computerized dictation software, and though reviewed following completion, some typographic errors may persist.  If there is confusion regarding any of this notes contents, please contact me for clarification

## 2025-03-19 ENCOUNTER — TELEPHONE (OUTPATIENT)
Dept: OTOLARYNGOLOGY | Facility: CLINIC | Age: 75
End: 2025-03-19

## 2025-07-01 ENCOUNTER — TRANSFERRED RECORDS (OUTPATIENT)
Dept: HEALTH INFORMATION MANAGEMENT | Facility: CLINIC | Age: 75
End: 2025-07-01
Payer: COMMERCIAL

## 2025-07-25 DIAGNOSIS — F32.0 CURRENT MILD EPISODE OF MAJOR DEPRESSIVE DISORDER WITHOUT PRIOR EPISODE: ICD-10-CM

## 2025-07-25 ASSESSMENT — PATIENT HEALTH QUESTIONNAIRE - PHQ9
10. IF YOU CHECKED OFF ANY PROBLEMS, HOW DIFFICULT HAVE THESE PROBLEMS MADE IT FOR YOU TO DO YOUR WORK, TAKE CARE OF THINGS AT HOME, OR GET ALONG WITH OTHER PEOPLE: SOMEWHAT DIFFICULT
SUM OF ALL RESPONSES TO PHQ QUESTIONS 1-9: 4
SUM OF ALL RESPONSES TO PHQ QUESTIONS 1-9: 4

## 2025-07-28 RX ORDER — VENLAFAXINE HYDROCHLORIDE 37.5 MG/1
37.5 CAPSULE, EXTENDED RELEASE ORAL DAILY
Qty: 90 CAPSULE | Refills: 0 | Status: SHIPPED | OUTPATIENT
Start: 2025-07-28

## 2025-07-28 NOTE — TELEPHONE ENCOUNTER
5/9/2024     7:43 AM 10/22/2024    10:49 AM 7/25/2025     1:13 PM   PHQ   PHQ-9 Total Score 3 0 4    Q9: Thoughts of better off dead/self-harm past 2 weeks Not at all Not at all Not at all       Patient-reported    Renu Acevedo RN  [facility]

## (undated) RX ORDER — FENTANYL CITRATE 50 UG/ML
INJECTION, SOLUTION INTRAMUSCULAR; INTRAVENOUS
Status: DISPENSED
Start: 2024-01-18